# Patient Record
Sex: MALE | Race: WHITE | NOT HISPANIC OR LATINO | Employment: OTHER | URBAN - METROPOLITAN AREA
[De-identification: names, ages, dates, MRNs, and addresses within clinical notes are randomized per-mention and may not be internally consistent; named-entity substitution may affect disease eponyms.]

---

## 2017-01-09 ENCOUNTER — HISTORICAL (OUTPATIENT)
Dept: ADMINISTRATIVE | Facility: HOSPITAL | Age: 67
End: 2017-01-09

## 2017-01-13 ENCOUNTER — HISTORICAL (OUTPATIENT)
Dept: ADMINISTRATIVE | Facility: HOSPITAL | Age: 67
End: 2017-01-13

## 2017-01-15 ENCOUNTER — HISTORICAL (OUTPATIENT)
Dept: LAB | Facility: HOSPITAL | Age: 67
End: 2017-01-15

## 2017-01-18 ENCOUNTER — HISTORICAL (OUTPATIENT)
Dept: LAB | Facility: HOSPITAL | Age: 67
End: 2017-01-18

## 2017-01-20 ENCOUNTER — HISTORICAL (OUTPATIENT)
Dept: ADMINISTRATIVE | Facility: HOSPITAL | Age: 67
End: 2017-01-20

## 2017-01-26 ENCOUNTER — HISTORICAL (OUTPATIENT)
Dept: LAB | Facility: HOSPITAL | Age: 67
End: 2017-01-26

## 2017-05-25 ENCOUNTER — HISTORICAL (OUTPATIENT)
Dept: LAB | Facility: HOSPITAL | Age: 67
End: 2017-05-25

## 2017-05-25 LAB
ABS NEUT (OLG): 4.5 X10(3)/MCL
BUN SERPL-MCNC: 29 MG/DL (ref 7–18)
CALCIUM SERPL-MCNC: 9.4 MG/DL (ref 8.5–10.1)
CHLORIDE SERPL-SCNC: 95 MMOL/L (ref 98–107)
CO2 SERPL-SCNC: 35 MMOL/L (ref 21–32)
CREAT SERPL-MCNC: 1.57 MG/DL (ref 0.7–1.3)
ERYTHROCYTE [DISTWIDTH] IN BLOOD BY AUTOMATED COUNT: 14.6 % (ref 11.5–14.8)
GLUCOSE SERPL-MCNC: 152 MG/DL (ref 74–106)
HCT VFR BLD AUTO: 34 % (ref 36.2–49.3)
HGB BLD-MCNC: 11.5 GM/DL (ref 12.6–17.3)
INR PPP: 1
MCH RBC QN AUTO: 28.8 PG (ref 28.5–33.8)
MCHC RBC AUTO-ENTMCNC: 33.8 % (ref 32.6–37)
MCV RBC AUTO: 85.2 FL (ref 80–99)
PLATELET # BLD AUTO: 213 X10(3)/MCL (ref 136–369)
PMV BLD AUTO: 9 FL (ref 7.4–10.4)
POTASSIUM SERPL-SCNC: 3.3 MMOL/L (ref 3.5–5.1)
PROTHROMBIN TIME: 10.7 SECOND(S) (ref 9.8–12)
RBC # BLD AUTO: 3.99 X10(6)/MCL (ref 4.19–5.75)
SODIUM SERPL-SCNC: 137 MMOL/L (ref 136–145)
WBC # SPEC AUTO: 9.6 X10(3)/MCL (ref 4–10.5)

## 2017-05-30 ENCOUNTER — HISTORICAL (OUTPATIENT)
Dept: ADMINISTRATIVE | Facility: HOSPITAL | Age: 67
End: 2017-05-30

## 2017-08-31 ENCOUNTER — HISTORICAL (OUTPATIENT)
Dept: ADMINISTRATIVE | Facility: HOSPITAL | Age: 67
End: 2017-08-31

## 2017-09-03 LAB — FINAL CULTURE: NORMAL

## 2017-11-21 ENCOUNTER — HISTORICAL (OUTPATIENT)
Dept: SURGERY | Facility: HOSPITAL | Age: 67
End: 2017-11-21

## 2018-01-29 ENCOUNTER — HISTORICAL (OUTPATIENT)
Dept: LAB | Facility: HOSPITAL | Age: 68
End: 2018-01-29

## 2018-01-29 LAB
ABS NEUT (OLG): 6.41 X10(3)/MCL (ref 2.1–9.2)
BUN SERPL-MCNC: 31 MG/DL (ref 7–18)
CALCIUM SERPL-MCNC: 9.3 MG/DL (ref 8.5–10.1)
CHLORIDE SERPL-SCNC: 96 MMOL/L (ref 98–107)
CO2 SERPL-SCNC: 30 MMOL/L (ref 21–32)
CREAT SERPL-MCNC: 1.54 MG/DL (ref 0.7–1.3)
ERYTHROCYTE [DISTWIDTH] IN BLOOD BY AUTOMATED COUNT: 13.4 % (ref 11.5–17)
GLUCOSE SERPL-MCNC: 212 MG/DL (ref 74–106)
HCT VFR BLD AUTO: 31.2 % (ref 42–52)
HGB BLD-MCNC: 10.9 GM/DL (ref 14–18)
INR PPP: 1 (ref 2–3)
MCH RBC QN AUTO: 30.4 PG (ref 27–31)
MCHC RBC AUTO-ENTMCNC: 34.9 GM/DL (ref 33–36)
MCV RBC AUTO: 87.2 FL (ref 80–94)
PLATELET # BLD AUTO: 200 X10(3)/MCL (ref 130–400)
PMV BLD AUTO: 9.6 FL (ref 7.4–10.4)
POTASSIUM SERPL-SCNC: 3.5 MMOL/L (ref 3.5–5.1)
PROTHROMBIN TIME: 10.9 SECOND(S) (ref 9.3–11.4)
RBC # BLD AUTO: 3.58 X10(6)/MCL (ref 4.7–6.1)
SODIUM SERPL-SCNC: 137 MMOL/L (ref 136–145)
WBC # SPEC AUTO: 10.8 X10(3)/MCL (ref 4.5–11.5)

## 2018-01-30 ENCOUNTER — HISTORICAL (OUTPATIENT)
Dept: SURGERY | Facility: HOSPITAL | Age: 68
End: 2018-01-30

## 2018-07-11 ENCOUNTER — HISTORICAL (OUTPATIENT)
Dept: ADMINISTRATIVE | Facility: HOSPITAL | Age: 68
End: 2018-07-11

## 2018-07-16 LAB
FINAL CULTURE: NORMAL
FINAL CULTURE: NORMAL

## 2018-07-30 ENCOUNTER — HISTORICAL (OUTPATIENT)
Dept: ADMINISTRATIVE | Facility: HOSPITAL | Age: 68
End: 2018-07-30

## 2018-08-13 ENCOUNTER — HISTORICAL (OUTPATIENT)
Dept: ADMINISTRATIVE | Facility: HOSPITAL | Age: 68
End: 2018-08-13

## 2018-08-13 LAB
ALBUMIN SERPL-MCNC: 3.7 GM/DL (ref 3.4–5)
ALBUMIN/GLOB SERPL: 1 RATIO (ref 1–2)
ALP SERPL-CCNC: 99 UNIT/L (ref 45–117)
ALT SERPL-CCNC: 40 UNIT/L (ref 12–78)
AST SERPL-CCNC: 25 UNIT/L (ref 15–37)
BILIRUB SERPL-MCNC: 0.3 MG/DL (ref 0.2–1)
BILIRUBIN DIRECT+TOT PNL SERPL-MCNC: 0.1 MG/DL
BILIRUBIN DIRECT+TOT PNL SERPL-MCNC: 0.2 MG/DL
BUN SERPL-MCNC: 25 MG/DL (ref 7–18)
CALCIUM SERPL-MCNC: 9.9 MG/DL (ref 8.5–10.1)
CHLORIDE SERPL-SCNC: 97 MMOL/L (ref 98–107)
CO2 SERPL-SCNC: 34 MMOL/L (ref 21–32)
CREAT SERPL-MCNC: 1.5 MG/DL (ref 0.6–1.3)
ERYTHROCYTE [DISTWIDTH] IN BLOOD BY AUTOMATED COUNT: 15.7 % (ref 11.5–14.5)
GLOBULIN SER-MCNC: 4.7 GM/ML (ref 2.3–3.5)
GLUCOSE SERPL-MCNC: 246 MG/DL (ref 74–106)
HCT VFR BLD AUTO: 33.8 % (ref 40–51)
HGB BLD-MCNC: 11.1 GM/DL (ref 13.5–17.5)
MCH RBC QN AUTO: 30.4 PG (ref 26–34)
MCHC RBC AUTO-ENTMCNC: 32.8 GM/DL (ref 31–37)
MCV RBC AUTO: 92.6 FL (ref 80–100)
PLATELET # BLD AUTO: 224 X10(3)/MCL (ref 130–400)
PMV BLD AUTO: 9.9 FL (ref 7.4–10.4)
POTASSIUM SERPL-SCNC: 4.1 MMOL/L (ref 3.5–5.1)
PROT SERPL-MCNC: 8.4 GM/DL (ref 6.4–8.2)
RBC # BLD AUTO: 3.65 X10(6)/MCL (ref 4.5–5.9)
SODIUM SERPL-SCNC: 138 MMOL/L (ref 136–145)
WBC # SPEC AUTO: 8.5 X10(3)/MCL (ref 4.5–11)

## 2018-08-14 ENCOUNTER — HISTORICAL (OUTPATIENT)
Dept: SURGERY | Facility: HOSPITAL | Age: 68
End: 2018-08-14

## 2018-08-14 LAB — POTASSIUM SERPL-SCNC: 4 MMOL/L (ref 3.5–5.1)

## 2018-08-21 ENCOUNTER — HISTORICAL (OUTPATIENT)
Dept: SURGERY | Facility: HOSPITAL | Age: 68
End: 2018-08-21

## 2018-08-21 LAB
ABS NEUT (OLG): 3.93 X10(3)/MCL (ref 2.1–9.2)
BASOPHILS # BLD AUTO: 0.06 X10(3)/MCL
BASOPHILS NFR BLD AUTO: 1 %
EOSINOPHIL # BLD AUTO: 0.43 X10(3)/MCL
EOSINOPHIL NFR BLD AUTO: 5 %
ERYTHROCYTE [DISTWIDTH] IN BLOOD BY AUTOMATED COUNT: 14.6 % (ref 11.5–14.5)
HCT VFR BLD AUTO: 33.6 % (ref 40–51)
HGB BLD-MCNC: 11.1 GM/DL (ref 13.5–17.5)
IMM GRANULOCYTES # BLD AUTO: 0.03 10*3/UL
IMM GRANULOCYTES NFR BLD AUTO: 0 %
LYMPHOCYTES # BLD AUTO: 3.41 X10(3)/MCL
LYMPHOCYTES NFR BLD AUTO: 39 % (ref 13–40)
MCH RBC QN AUTO: 30.4 PG (ref 26–34)
MCHC RBC AUTO-ENTMCNC: 33 GM/DL (ref 31–37)
MCV RBC AUTO: 92.1 FL (ref 80–100)
MONOCYTES # BLD AUTO: 0.84 X10(3)/MCL
MONOCYTES NFR BLD AUTO: 10 % (ref 4–12)
NEUTROPHILS # BLD AUTO: 3.93 X10(3)/MCL
NEUTROPHILS NFR BLD AUTO: 45 X10(3)/MCL
PLATELET # BLD AUTO: 232 X10(3)/MCL (ref 130–400)
PMV BLD AUTO: 9.9 FL (ref 7.4–10.4)
POTASSIUM SERPL-SCNC: 3.7 MMOL/L (ref 3.5–5.1)
RBC # BLD AUTO: 3.65 X10(6)/MCL (ref 4.5–5.9)
WBC # SPEC AUTO: 8.7 X10(3)/MCL (ref 4.5–11)

## 2018-10-31 ENCOUNTER — HISTORICAL (OUTPATIENT)
Dept: ADMINISTRATIVE | Facility: HOSPITAL | Age: 68
End: 2018-10-31

## 2018-11-02 LAB — FINAL CULTURE: NORMAL

## 2019-02-07 ENCOUNTER — HISTORICAL (OUTPATIENT)
Dept: ADMINISTRATIVE | Facility: HOSPITAL | Age: 69
End: 2019-02-07

## 2019-02-09 LAB — FINAL CULTURE: NORMAL

## 2019-08-12 ENCOUNTER — HISTORICAL (OUTPATIENT)
Dept: LAB | Facility: HOSPITAL | Age: 69
End: 2019-08-12

## 2019-08-12 LAB
ABS NEUT (OLG): 14.78 X10(3)/MCL (ref 2.1–9.2)
ACANTHOCYTES (OLG): 0
ALBUMIN SERPL-MCNC: 3.3 GM/DL (ref 3.4–5)
ALBUMIN/GLOB SERPL: 0.8 {RATIO}
ALP SERPL-CCNC: 92 UNIT/L (ref 50–136)
ALT SERPL-CCNC: 22 UNIT/L (ref 12–78)
AST SERPL-CCNC: 16 UNIT/L (ref 15–37)
BASOPHILS # BLD AUTO: 0 X10(3)/MCL (ref 0–0.2)
BASOPHILS NFR BLD AUTO: 0 %
BILIRUB SERPL-MCNC: 0.4 MG/DL (ref 0.2–1)
BILIRUBIN DIRECT+TOT PNL SERPL-MCNC: 0.1 MG/DL (ref 0–0.2)
BILIRUBIN DIRECT+TOT PNL SERPL-MCNC: 0.3 MG/DL (ref 0–0.8)
BUN SERPL-MCNC: 36 MG/DL (ref 7–18)
BURR CELLS BLD QL SMEAR: 0
CALCIUM SERPL-MCNC: 9.5 MG/DL (ref 8.5–10.1)
CHLORIDE SERPL-SCNC: 100 MMOL/L (ref 98–107)
CO2 SERPL-SCNC: 27 MMOL/L (ref 21–32)
CREAT SERPL-MCNC: 2.23 MG/DL (ref 0.7–1.3)
DACRYOCYTES BLD QL SMEAR: 0
DEPRECATED CALCIDIOL+CALCIFEROL SERPL-MC: 15.94 NG/ML (ref 30–80)
EOSINOPHIL # BLD AUTO: 0 X10(3)/MCL (ref 0–0.9)
EOSINOPHIL NFR BLD AUTO: 0 %
ERYTHROCYTE [DISTWIDTH] IN BLOOD BY AUTOMATED COUNT: 17.8 % (ref 11.5–17)
EST. AVERAGE GLUCOSE BLD GHB EST-MCNC: 154 MG/DL
GLOBULIN SER-MCNC: 4.4 GM/DL (ref 2.4–3.5)
GLUCOSE SERPL-MCNC: 186 MG/DL (ref 74–106)
HBA1C MFR BLD: 7 % (ref 4.2–6.3)
HCT VFR BLD AUTO: 32.3 % (ref 42–52)
HGB BLD-MCNC: 9.6 GM/DL (ref 14–18)
LYMPHOCYTES # BLD AUTO: 1.9 X10(3)/MCL (ref 0.6–4.6)
LYMPHOCYTES NFR BLD AUTO: 10 %
MAGNESIUM SERPL-MCNC: 1.8 MG/DL (ref 1.8–2.4)
MCH RBC QN AUTO: 27.7 PG (ref 27–31)
MCHC RBC AUTO-ENTMCNC: 29.7 GM/DL (ref 33–36)
MCV RBC AUTO: 93.1 FL (ref 80–94)
MONOCYTES # BLD AUTO: 1.3 X10(3)/MCL (ref 0.1–1.3)
MONOCYTES NFR BLD AUTO: 7 %
NEUTROPHILS # BLD AUTO: 14.78 X10(3)/MCL (ref 2.1–9.2)
NEUTROPHILS NFR BLD AUTO: 81 %
OVALOCYTES BLD QL SMEAR: 0
PLATELET # BLD AUTO: 238 X10(3)/MCL (ref 130–400)
PLATELET # BLD EST: NORMAL 10*3/UL
PMV BLD AUTO: 11.1 FL (ref 7.4–10.4)
POTASSIUM SERPL-SCNC: 4.1 MMOL/L (ref 3.5–5.1)
PROT SERPL-MCNC: 7.7 GM/DL (ref 6.4–8.2)
PSA SERPL-MCNC: 0.3 NG/ML (ref 0–4)
PTH-INTACT SERPL-MCNC: 6.8 PG/ML (ref 18.4–80.1)
RBC # BLD AUTO: 3.47 X10(6)/MCL (ref 4.7–6.1)
SODIUM SERPL-SCNC: 138 MMOL/L (ref 136–145)
SPHEROCYTES BLD QL SMEAR: 0
WBC # SPEC AUTO: 18.2 X10(3)/MCL (ref 4.5–11.5)

## 2020-09-08 ENCOUNTER — HISTORICAL (OUTPATIENT)
Dept: ADMINISTRATIVE | Facility: HOSPITAL | Age: 70
End: 2020-09-08

## 2020-09-08 LAB
ABS NEUT (OLG): 5.13 X10(3)/MCL (ref 2.1–9.2)
ALBUMIN SERPL-MCNC: 3.5 GM/DL (ref 3.4–5)
ALBUMIN/GLOB SERPL: 1 RATIO (ref 1.1–2)
ALP SERPL-CCNC: 90 UNIT/L (ref 40–150)
ALT SERPL-CCNC: 24 UNIT/L (ref 0–55)
AST SERPL-CCNC: 24 UNIT/L (ref 5–34)
BASOPHILS # BLD AUTO: 0 X10(3)/MCL (ref 0–0.2)
BASOPHILS NFR BLD AUTO: 1 %
BILIRUB SERPL-MCNC: 0.6 MG/DL
BILIRUBIN DIRECT+TOT PNL SERPL-MCNC: 0.2 MG/DL (ref 0–0.5)
BILIRUBIN DIRECT+TOT PNL SERPL-MCNC: 0.4 MG/DL (ref 0–0.8)
BNP BLD-MCNC: 118.4 PG/ML
BUN SERPL-MCNC: 34.1 MG/DL (ref 8.4–25.7)
CALCIUM SERPL-MCNC: 8.7 MG/DL (ref 8.8–10)
CHLORIDE SERPL-SCNC: 100 MMOL/L (ref 98–107)
CO2 SERPL-SCNC: 28 MMOL/L (ref 23–31)
CREAT SERPL-MCNC: 1.72 MG/DL (ref 0.73–1.18)
EOSINOPHIL # BLD AUTO: 0.2 X10(3)/MCL (ref 0–0.9)
EOSINOPHIL NFR BLD AUTO: 3 %
ERYTHROCYTE [DISTWIDTH] IN BLOOD BY AUTOMATED COUNT: 15.9 % (ref 11.5–17)
GLOBULIN SER-MCNC: 3.5 GM/DL (ref 2.4–3.5)
GLUCOSE SERPL-MCNC: 123 MG/DL (ref 82–115)
HCT VFR BLD AUTO: 34.3 % (ref 42–52)
HGB BLD-MCNC: 10.7 GM/DL (ref 14–18)
LYMPHOCYTES # BLD AUTO: 2.2 X10(3)/MCL (ref 0.6–4.6)
LYMPHOCYTES NFR BLD AUTO: 26 %
MCH RBC QN AUTO: 28.9 PG (ref 27–31)
MCHC RBC AUTO-ENTMCNC: 31.2 GM/DL (ref 33–36)
MCV RBC AUTO: 92.7 FL (ref 80–94)
MONOCYTES # BLD AUTO: 0.8 X10(3)/MCL (ref 0.1–1.3)
MONOCYTES NFR BLD AUTO: 10 %
NEUTROPHILS # BLD AUTO: 5.13 X10(3)/MCL (ref 2.1–9.2)
NEUTROPHILS NFR BLD AUTO: 60 %
PLATELET # BLD AUTO: 207 X10(3)/MCL (ref 130–400)
PMV BLD AUTO: 11.3 FL (ref 9.4–12.4)
POTASSIUM SERPL-SCNC: 4.5 MMOL/L (ref 3.5–5.1)
PROT SERPL-MCNC: 7 GM/DL (ref 5.8–7.6)
RBC # BLD AUTO: 3.7 X10(6)/MCL (ref 4.7–6.1)
SODIUM SERPL-SCNC: 139 MMOL/L (ref 136–145)
WBC # SPEC AUTO: 8.5 X10(3)/MCL (ref 4.5–11.5)

## 2022-04-10 ENCOUNTER — HISTORICAL (OUTPATIENT)
Dept: ADMINISTRATIVE | Facility: HOSPITAL | Age: 72
End: 2022-04-10

## 2022-04-30 VITALS
BODY MASS INDEX: 40.09 KG/M2 | OXYGEN SATURATION: 98 % | SYSTOLIC BLOOD PRESSURE: 112 MMHG | HEIGHT: 70 IN | WEIGHT: 280 LBS | DIASTOLIC BLOOD PRESSURE: 69 MMHG

## 2022-04-30 NOTE — OP NOTE
Patient:   Landon Aragon            MRN: [id number]            FIN: [id number]               Age:   67 years     Sex:  Male     :  1950   Associated Diagnoses:   None   Author:   Landon Armas DPM      Operative Note   Operative Information   Date/ Time:  2017 13:10:00.     Procedures Performed: Excisional full-thickness debridement including subcutaneous tissue with application of theraskin graft.     Indications: 8 x 10 cm wound to left heel pre-and post-debridement measurements are the same with no increase in depth.     Preoperative Diagnosis: Nonhealing ulceration left heel.     Postoperative Diagnosis: Same.     Surgeon: Landon Armas DPM.     Anesthesia: Local with sedation.     Description of Procedure/Findings/    Complications: Seizure #1: Excisional full-thickness debridement including subcutaneous tissue with application of theraskin graft.    Tension was directed to the posterior aspect of the left heel where using a 15 blade scalpel as well as in an millimeters dermal curet all necrotic tissue was removed from the wound.  The wound was scored with 15 blade scalpel as well a extra large theraskin graft was then applied to the wound with 100% usage and 100% coverage of the wound.    The area was then sterilely bandaged.  .     Esimated blood loss: No blood loss.

## 2022-04-30 NOTE — OP NOTE
Patient:   Landon Aragon            MRN: [id number]            FIN: [id number]               Age:   67 years     Sex:  Male     :  1950   Associated Diagnoses:   None   Author:   Landon Armas DPM      Operative Note   Operative Information   Date/ Time:  2018 13:12:00.     Procedures Performed: Excisional full-thickness debridement including subcutaneous tissue multiple ulcerations bilateral lower extremities application of epi fix graft the right heel and theraskin graft to the left heel.     Indications: 4 x 4 cm pre-and post debridement with 0.1-0.2 cm of depth change on the right heel    9 x 9 cm pre-and post debridement with an increase in depth from 0.1-0.3 cm on the left heel.     Preoperative Diagnosis: Nonhealing ulcerations as above.     Postoperative Diagnosis: Same.     Surgeon: Landon Armas DPM.     Anesthesia: Local with sedation.     Description of Procedure/Findings/    Complications: Procedure #1: Excisional full-thickness debridement including subcutaneous tissue with application of epi fix graft right heel.    Attention was directed to the right heel where using accommodation of 15 blade scalpel and 7 mm dermal curette all necrotic tissue was removed from the area.  At the level of bleeding tissue a 4 x 4 epi fix graft was placed and the area was bandaged.    Procedure #2: Excisional full-thickness debridement including subcutaneous tissue with application of theraskin graft left heel.    Attention was directed to the left heel where using a combination of 15 blade scalpel and Paul millimeters dermal curette all necrotic tissue was removed down to good bleeding tissue.  The wound bed was scored and a extra large theraskin graft was placed over the wound and stapled into place.  Both were then sterilely bandaged..     Esimated blood loss: No blood loss.

## 2022-04-30 NOTE — OP NOTE
Patient:   Landon Aragon            MRN: [id number]            FIN: [id number]               Age:   66 years     Sex:  Male     :  1950   Associated Diagnoses:   None   Author:   Landon Armas DPM      Operative Note   Operative Information   Date/ Time:  2017 12:59:00.     Procedures Performed: Excisional full-thickness debridement including subcutaneous tissue muscle and tendon of the left heel with application of theraskin graft.     Indications: Pre-debridement measurements 10 x 10 cm pre-and postoperatively with 0.1 cm of depth pre-debridement and 1.5 cm of depth post debridement.     Preoperative Diagnosis: Nonhealing wound left heel.     Postoperative Diagnosis: Same.     Surgeon: Landon Armas DPM.     Anesthesia: Local with sedation.     Description of Procedure/Findings/    Complications: Procedure 1: Excisional full-thickness debridement includes subcutaneous tissue muscle-tendon of the left heel with application of theraskin graft.    Attention was directed to the left heel where all necrotic tissue was removed utilizing a combination of 7 mm dermal curette as well as a 15 blade scalpel.  The residual Achilles tendon was removed down to periosteum however no osseous structures were identified at this point.  One extra large theraskin graft was utilized to cover the area 100%.  100% usage is identified.  The graft was stapled in the place.  The area was then sterilely bandaged..     Esimated blood loss: loss  20  cc.

## 2022-04-30 NOTE — OP NOTE
Patient:   Landon Aragon            MRN: [id number]            FIN: [id number]               Age:   67 years     Sex:  Male     :  1950   Associated Diagnoses:   None   Author:   Linda Falk MD      DATE OF SURGERY: 18    ATTENDING PHYSICIAN: Erin Rosenthal MD    RESIDENT SURGEON: Linda Falk MD    PREOPERATIVE DIAGNOSIS: Bilateral Mixed Hearing Loss, Eustachian tube dysfunction    POSTOPERATIVE DIAGNOSIS: Bilateral Mixed Hearing Loss, Eustachian tube dysfunction    PROCEDURE:   1. Right PET placement  2. Bilateral exam under anesthesia  3. Wick placement in Left ear      PROCEDURE IN DETAIL: The patient was brought to the operating room and placed in supine position. Anesthesia was induced via endo tracheal intubation. Attention was first turned to the left ear. The operating microscope was used to visualize the ear and cerumen was removed using a curette. The lobule had ezcematous changes that spread to the EAC. The EAC was symmetrically edematous and very difficult to visualize the TM. A small speculum was fit into the ear with some difficulty but I was unable to fit a larger size for better visibility. A small amount of wax and debris was suctioned from the deep EAC but the TM was still not visualized appreciably. At this time a wick was placed in the left ear and drops were instilled. Next, attention was turned to the right ear where again the microscope was used to clean cerumen from the EAC. The TM was visualized and a radial myringotomy incision was made in the anterior inferior quadrant. No fluid was suctioned from the middle ear and an Agustin PE tube was placed without difficulty. Drops were placed in the ear.  Patient was handed back to Anesthesia to be awakened and transferred to the postanesthesia care unit in stable condition.  Patient was given a Rx for Ciprodex drops and instructed to return in 1 week to re-attempt PET placement on the Left side. He can attempt  hyperbarics treatment in the meantime if otalgia resolves.     COMPLICATIONS: None.    ESTIMATED BLOOD LOSS: 0 cc.    SPECIMENS: none      Linda Falk MD  HO-II

## 2022-04-30 NOTE — OP NOTE
Patient:   Landon Aragon            MRN: [id number]            FIN: [id number]               Age:   67 years     Sex:  Male     :  1950   Associated Diagnoses:   None   Author:   Linda Falk MD      DATE OF SURGERY: 18    ATTENDING PHYSICIAN: Erin Rosenthal MD    RESIDENT SURGEON: Linda Falk MD    PREOPERATIVE DIAGNOSIS: Bilateral Mixed Hearing Loss    POSTOPERATIVE DIAGNOSIS: Left Serous Effusion, Otomycosis of Right Ear    PROCEDURE:   1. Left PET placement  2. Right EUA    PROCEDURE IN DETAIL: The patient was brought to the operating room and placed in supine position. Anesthesia was induced via endo tracheal intubation. Attention was first turned to the right ear, the ear microscope was brought into the surgical field. A small piece of a cotton ball was removed from the auditory canal and small white hyphae were visualized beyond. A large amount of cerumen and soft squamous debris was removed easily with suction. A PET was visualized in the anterior inferior quadrant with surrounding black and white appearing fungal debris; a piece was sent for tissue culture. Next, attention was turned to the left ear. The smallest speculum was inserted into the canal with some difficulty. The canal was gradually dilated until the deeper portions of the canal were visible. Sopious amount of soft squamous debris was removed with suction. The tympanic membrane was finally visualized and appeared intact. A myringotomy was made in the anterior inferior quadrant and a small amount of thick fluid was suctioned free. Next an lopez PET was placed in the tympanic membrane easily. Floxin drops were instilled into the ear and a cotton ball was applied.    Patient was handed back to Anesthesia to be awakened and transferred to the postanesthesia care unit in stable condition.    COMPLICATIONS: None.    ESTIMATED BLOOD LOSS: 0 cc.    SPECIMENS:   1. Right ear culture    Linda Falk MD  -II

## 2022-05-02 NOTE — HISTORICAL [facility] CERNER
This is a historical note converted from [employer]. Formatting and pictures may have been removed.  Please reference [employer] for original formatting and attached multimedia. History of Present Illness  67 year old male undergoing hyperbarics for foot. Had one treatment with severe left otalgia and hearing loss. Already had hearing loss on the right. No otorrhea. No vertigo. Occasional nonpulsatile tinnitus. History of loud noise exposure from work. No ear surgeries. No recent audiogram.  ?   8/14/18: Here today for b/l PETs  Review of Systems  General: denies fatigue, unintentional weight loss, fever or chills  Eyes: denies vision change  Ears: denies change in hearing, otorrhea, otalgia, tinnitus, or vertigo  Nose: denies rhinorrhea, nasal congestion, sneezing  Mouth: denies sore throat  Neck: denies new neck mass or lymphadenopathy  Respiratory: denies cough, dyspnea  Cardiovascular: denies chest pain  GI: denies dysphagia, or nausea  Skin: denies rash or changing skin lesion  Endocrine: denies heat or cold intolerance  Neurologic: denies headache, syncope, motor or sensory deficit  Psych: denies anxiety or depression  Physical Exam  General - no?acute distress, alert/oriented  Voice - no dysphonia  Eye - extraocular movements intact bilaterally, pupils equal round and reactive to light and accommodation  Head - normocephalic, atraumatic  Ears - AD: normal external ear, EAC normal, TM appears opacified, difficult?to see anterior most aspect  ?????????AS: normal external ear, EAC normal, TM? with wax and cotton overlying posterior/inferior aspect and unable to see remainder of TM, no middle ear effusion, no retraction  ?????????Grossly?decreased hearing, ariza lateralizes right, BC>AC?bilaterally  Nose - bilateral nares patent, no nasal septal deviation, no inferior turbinate hypertrophy, no masses/lesions apparent  Oral cavity/oropharynx - mucosal membranes moist, tonsils within normal limits, floor of mouth soft and  nontender, tongue within normal limits, no masses or lesions noted  Neck - supple, nontender, no cervical?lymphadenopathy, normal range of motion?  Cardiovascular - regular rate, normal rhythm;?radial pulses palpable bilaterally  Respiratory - nonlabored respirations  Neurologic - cranial nerves II to XII grossly intact bilaterally?  ?  ?   Audio:  Right ear: ??Moderate to severe mixed hearing loss from 250-8kHz. Speech and discrimination scores are in agreement with puretone findings.  ?  Left ear:??Mild to severe (possibly) mixed hearing loss from 250-8kHz. ?Note, true bone conduction scores could not be obtained due to the masking dilemma. ?Speech and discrimination scores are in agreement with puretone findings.  Right Type A; Left Type B  Assessment/Plan  Ordered:  ofloxacin otic, 1 drop(s), form: Soln-Otic, Otic, Once-Unscheduled, Order duration: 1 dose(s), first dose 08/14/18 5:09:00 CDT, have available in OR  Place in Outpatient Observation, 08/14/18 5:09:00 CDT, Deya RAYA, [facility] [place] [facility], No  To OR today for b/l PETs  RTC in 3 weeks for tube check  OK to resume hyperbarics ASAP   Problem List/Past Medical History  Ongoing  Arthritis  Barotrauma  CHF, acute  CKD - chronic kidney disease  Constipation  Diabetes  Dyslipidemia  Elevated serum cholesterol  Foot infection  Hearing loss  Hypertension  Impaired mobility  Kidney failure, acute  Neuropathy  Osteomyelitis due to type 2 diabetes mellitus  PAD (peripheral artery disease)  Pneumonia  PVD (peripheral vascular disease)  Shortness of breath  Ulcer of foot, chronic  Ulcer of heel  VH - Vitreous hemorrhage  Historical  Cataract  Procedure/Surgical History  Application of skin substitute graft to face, scalp, eyelids, mouth, neck, ears, orbits, genitalia, hands, feet, and/or multiple digits, total wound surface area up to 100 sq cm; each additional 25 sq cm wound surface area, or part thereof (List separatel (01/30/2018)  Application of skin substitute  graft to face, scalp, eyelids, mouth, neck, ears, orbits, genitalia, hands, feet, and/or multiple digits, total wound surface area up to 100 sq cm; each additional 25 sq cm wound surface area, or part thereof (List separatel (01/30/2018)  Application of skin substitute graft to face, scalp, eyelids, mouth, neck, ears, orbits, genitalia, hands, feet, and/or multiple digits, total wound surface area up to 100 sq cm; each additional 25 sq cm wound surface area, or part thereof (List separatel (01/30/2018)  Application of skin substitute graft to face, scalp, eyelids, mouth, neck, ears, orbits, genitalia, hands, feet, and/or multiple digits, total wound surface area up to 100 sq cm; first 25 sq cm or less wound surface area (01/30/2018)  Excision of Left Foot Subcutaneous Tissue and Fascia, Open Approach (01/30/2018)  Excision of Right Foot Subcutaneous Tissue and Fascia, Open Approach (01/30/2018)  Replacement of Left Foot Skin with Nonautologous Tissue Substitute, Full Thickness, External Approach (01/30/2018)  Replacement of Right Foot Skin with Nonautologous Tissue Substitute, Full Thickness, External Approach (01/30/2018)  Surgical preparation or creation of recipient site by excision of open wounds, burn eschar, or scar (including subcutaneous tissues), or incisional release of scar contracture, face, scalp, eyelids, mouth, neck, ears, orbits, genitalia, hands, feet and/or (01/30/2018)  Wound Debridement (Bilateral) (01/30/2018)  Application of skin substitute graft to face, scalp, eyelids, mouth, neck, ears, orbits, genitalia, hands, feet, and/or multiple digits, total wound surface area up to 100 sq cm; first 25 sq cm or less wound surface area (11/21/2017)  Replacement of Left Foot Subcutaneous Tissue and Fascia with Nonautologous Tissue Substitute, Percutaneous Approach (11/21/2017)  Wound Debridement (Left) (11/21/2017)  Application of skin substitute graft to face, scalp, eyelids, mouth, neck, ears, orbits,  genitalia, hands, feet, and/or multiple digits, total wound surface area up to 100 sq cm; first 25 sq cm or less wound surface area (05/30/2017)  Excision of Left Foot Muscle, Open Approach (05/30/2017)  Replacement of Left Foot Skin with Synthetic Substitute, Full Thickness, External Approach (05/30/2017)  Wound Debridement (Left) (05/30/2017)  Fluoroscopy of Multiple Coronary Arteries using Low Osmolar Contrast (02/09/2017)  Measurement of Cardiac Sampling and Pressure, Left Heart, Percutaneous Approach (02/09/2017)  Transfusion of Nonautologous Red Blood Cells into Peripheral Vein, Percutaneous Approach (02/06/2017)  Application of skin substitute graft to face, scalp, eyelids, mouth, neck, ears, orbits, genitalia, hands, feet, and/or multiple digits, total wound surface area greater than or equal to 100 sq cm; each additional 100 sq cm wound surface area, or part the (01/20/2017)  Application of skin substitute graft to face, scalp, eyelids, mouth, neck, ears, orbits, genitalia, hands, feet, and/or multiple digits, total wound surface area greater than or equal to 100 sq cm; first 100 sq cm wound surface area, or 1% of body area of (01/20/2017)  Excision of Left Foot Bursa and Ligament, Open Approach (01/20/2017)  Replacement of Left Foot Skin with Nonautologous Tissue Substitute, Full Thickness, External Approach (01/20/2017)  Surgical preparation or creation of recipient site by excision of open wounds, burn eschar, or scar (including subcutaneous tissues), or incisional release of scar contracture, face, scalp, eyelids, mouth, neck, ears, orbits, genitalia, hands, feet and/or (01/20/2017)  Surgical preparation or creation of recipient site by excision of open wounds, burn eschar, or scar (including subcutaneous tissues), or incisional release of scar contracture, face, scalp, eyelids, mouth, neck, ears, orbits, genitalia, hands, feet and/or (01/20/2017)  Wound Debridement (Left, Foot) (01/20/2017)  Insertion  of Infusion Device into Superior Vena Cava, Percutaneous Approach (01/13/2017)  Insertion of peripherally inserted central venous catheter (PICC), without subcutaneous port or pump; age 5 years or older (01/13/2017)  Insertion of Infusion Device into Right Internal Jugular Vein, Percutaneous Approach (01/09/2017)  Insertion of peripherally inserted central venous catheter (PICC), without subcutaneous port or pump; age 5 years or older (01/09/2017)  Debridement (eg, high pressure waterjet with/without suction, sharp selective debridement with scissors, scalpel and forceps), open wound, (eg, fibrin, devitalized epidermis and/or dermis, exudate, debris, biofilm), including topical application(s), wound (12/22/2016)  Debridement (eg, high pressure waterjet with/without suction, sharp selective debridement with scissors, scalpel and forceps), open wound, (eg, fibrin, devitalized epidermis and/or dermis, exudate, debris, biofilm), including topical application(s), wound (12/22/2016)  Debridement (eg, high pressure waterjet with/without suction, sharp selective debridement with scissors, scalpel and forceps), open wound, (eg, fibrin, devitalized epidermis and/or dermis, exudate, debris, biofilm), including topical application(s), wound (12/22/2016)  Debridement (eg, high pressure waterjet with/without suction, sharp selective debridement with scissors, scalpel and forceps), open wound, (eg, fibrin, devitalized epidermis and/or dermis, exudate, debris, biofilm), including topical application(s), wound (12/22/2016)  Debridement (eg, high pressure waterjet with/without suction, sharp selective debridement with scissors, scalpel and forceps), open wound, (eg, fibrin, devitalized epidermis and/or dermis, exudate, debris, biofilm), including topical application(s), wound (12/22/2016)  Extraction of Left Foot Skin, External Approach (12/22/2016)  Excision of Left Foot Subcutaneous Tissue and Fascia, Open Approach  (12/09/2016)  Full thickness graft, free, including direct closure of donor site, forehead, cheeks, chin, mouth, neck, axillae, genitalia, hands, and/or feet; 20 sq cm or less (12/09/2016)  Replacement of Left Foot Skin with Nonautologous Tissue Substitute, Full Thickness, External Approach (12/09/2016)  Wound Debridement (Left) (12/09/2016)  Debridement, muscle and/or fascia (includes epidermis, dermis, and subcutaneous tissue, if performed); first 20 sq cm or less (11/01/2016)  Excision of Left Foot Subcutaneous Tissue and Fascia, Open Approach (11/01/2016)  Full thickness graft, free, including direct closure of donor site, forehead, cheeks, chin, mouth, neck, axillae, genitalia, hands, and/or feet; 20 sq cm or less (11/01/2016)  Replacement of Left Foot Skin with Nonautologous Tissue Substitute, Full Thickness, External Approach (11/01/2016)  Wound Debridement (Left) (11/01/2016)  Application of skin substitute graft to face, scalp, eyelids, mouth, neck, ears, orbits, genitalia, hands, feet, and/or multiple digits, total wound surface area greater than or equal to 100 sq cm; each additional 100 sq cm wound surface area, or part the (09/20/2016)  Application of skin substitute graft to face, scalp, eyelids, mouth, neck, ears, orbits, genitalia, hands, feet, and/or multiple digits, total wound surface area greater than or equal to 100 sq cm; first 100 sq cm wound surface area, or 1% of body area of (09/20/2016)  Autologous platelet rich plasma for chronic wounds/ulcers, including phlebotomy, centrifugation, and all other preparatory procedures, administration and dressings, per treatment (09/20/2016)  Debridement, muscle and/or fascia (includes epidermis, dermis, and subcutaneous tissue, if performed); each additional 20 sq cm, or part thereof (List separately in addition to code for primary procedure) (09/20/2016)  Debridement, muscle and/or fascia (includes epidermis, dermis, and subcutaneous tissue, if  performed); each additional 20 sq cm, or part thereof (List separately in addition to code for primary procedure) (09/20/2016)  Debridement, muscle and/or fascia (includes epidermis, dermis, and subcutaneous tissue, if performed); each additional 20 sq cm, or part thereof (List separately in addition to code for primary procedure) (09/20/2016)  Debridement, muscle and/or fascia (includes epidermis, dermis, and subcutaneous tissue, if performed); each additional 20 sq cm, or part thereof (List separately in addition to code for primary procedure) (09/20/2016)  Debridement, muscle and/or fascia (includes epidermis, dermis, and subcutaneous tissue, if performed); each additional 20 sq cm, or part thereof (List separately in addition to code for primary procedure) (09/20/2016)  Debridement, muscle and/or fascia (includes epidermis, dermis, and subcutaneous tissue, if performed); first 20 sq cm or less (09/20/2016)  Excision of Left Foot Tendon, Open Approach (09/20/2016)  Replacement of Left Foot Skin with Nonautologous Tissue Substitute, Full Thickness, External Approach (09/20/2016)  Wound Debridement (Left) (09/20/2016)  Application of skin substitute graft to face, scalp, eyelids, mouth, neck, ears, orbits, genitalia, hands, feet, and/or multiple digits, total wound surface area greater than or equal to 100 sq cm; each additional 100 sq cm wound surface area, or part the (07/22/2016)  Application of skin substitute graft to face, scalp, eyelids, mouth, neck, ears, orbits, genitalia, hands, feet, and/or multiple digits, total wound surface area greater than or equal to 100 sq cm; first 100 sq cm wound surface area, or 1% of body area of (07/22/2016)  Debridement Foot (Left, Foot) (07/22/2016)  Excision of Left Foot Tendon, Open Approach (07/22/2016)  Replacement of Left Foot Skin with Nonautologous Tissue Substitute, Full Thickness, External Approach (07/22/2016)  Surgical preparation or creation of recipient site by  excision of open wounds, burn eschar, or scar (including subcutaneous tissues), or incisional release of scar contracture, face, scalp, eyelids, mouth, neck, ears, orbits, genitalia, hands, feet and/or (07/22/2016)  Surgical preparation or creation of recipient site by excision of open wounds, burn eschar, or scar (including subcutaneous tissues), or incisional release of scar contracture, face, scalp, eyelids, mouth, neck, ears, orbits, genitalia, hands, feet and/or (07/22/2016)  Amputation Foot (05/24/2016)  Excision of Left Tarsal, Open Approach (05/24/2016)  Wound Debridement (Left) (05/17/2016)  Debridement, bone (includes epidermis, dermis, subcutaneous tissue, muscle and/or fascia, if performed); first 20 sq cm or less (03/29/2016)  Excision of Left Tarsal, Open Approach (03/29/2016)  Full thickness graft, free, including direct closure of donor site, forehead, cheeks, chin, mouth, neck, axillae, genitalia, hands, and/or feet; 20 sq cm or less (03/29/2016)  Replacement of Left Foot Subcutaneous Tissue and Fascia with Nonautologous Tissue Substitute, Percutaneous Approach (03/29/2016)  Wound Debridement (Left) (03/29/2016)  Debridement Foot (Left) (02/19/2016)  Excision of Left Foot Subcutaneous Tissue and Fascia, Open Approach (02/19/2016)  Supplement of Left Foot Subcutaneous Tissue and Fascia with Synthetic Substitute, Open Approach (02/19/2016)  Dilation of Left Peroneal Artery with Intraluminal Device, Percutaneous Approach (02/12/2016)  Dilation of Left Posterior Tibial Artery with Intraluminal Device, Percutaneous Approach (02/12/2016)  Extirpation of Matter from Left Peroneal Artery, Percutaneous Approach (02/12/2016)  Extirpation of Matter from Left Posterior Tibial Artery, Percutaneous Approach (02/12/2016)  Fluoroscopy of Aorta and Bilateral Lower Extremity Arteries using Low Osmolar Contrast (02/12/2016)  Debridement Foot (Left) (02/10/2016)  Excision of Left Foot Subcutaneous Tissue and Fascia,  Open Approach (02/10/2016)  Excision of Left Tarsal, Percutaneous Approach, Diagnostic (02/10/2016)  Supplement of Left Foot Subcutaneous Tissue and Fascia with Synthetic Substitute, Open Approach (02/10/2016)  Debridement Foot (Left) (02/05/2016)  Excision of Left Foot Tendon, Open Approach (02/05/2016)  Destruction of chorioretinal lesion by laser photocoagulation (03/17/2015)  Other mechanical vitrectomy (03/17/2015)  Vitrectomy (Left) (03/17/2015)  Vitrectomy, mechanical, pars plana approach; with endolaser panretinal photocoagulation (03/17/2015)  After-cataract  Angiogram  Angioplasty of artery of lower extremity  Appendectomy  Appendectomy  Carpal tunnel decompression  Carpal tunnel release  Cataract extraction and insertion of intraocular lens  Circumcision  Circumcision  I and D  knee surgery  ORIF - Open reduction and internal fixation of fracture  orif right foot  Phacoemulsification of cataract with intraocular lens implantation  PTA   Medications  Inpatient  Dextrose 50% and Water (50 mL vial/syringe), 12.5 gm= 25 mL, IV, As Directed, PRN  Floxin Otic 0.3% otic solution, 1 drop(s), Otic, Once-Unscheduled  insulin lispro 100 units/mL subcutaneous injection, 2-9 units, Subcutaneous, As Directed, PRN  IVF D5 Lactated Ringers LR Infusion 1,000 mL, 1000 mL, IV  Home  aspirin 81 mg oral tablet, 81 mg= 1 tab(s), Oral, Daily  atorvastatin 40 mg oral tablet, 40 mg= 1 tab(s), Oral, Daily  famotidine 20 mg oral tablet, 20 mg= 1 tab(s), Oral, BID  gabapentin 100 mg oral capsule, 100 mg= 1 cap(s), Oral, BID  glipiZIDE 10 mg oral tablet, 10 mg= 1 tab(s), Oral, BID  Humalog 100 U/mL (per 1 unit), 25 units, Subcutaneous, TIDAC  Lantus 100 units/mL subcutaneous inj., 70 units, Subcutaneous, BID  metoprolol succinate 50 mg oral tablet extended release, 50 mg= 1 tab(s), Oral, Daily  Norvasc 5 mg oral tablet, 5 mg= 1 tab(s), Oral, Daily  Plavix 75 mg oral tablet, 75 mg= 1 tab(s), Oral, Daily  SPIRONOLACT TAB 25MG, 25 mg= 1  tab(s), Oral, Daily  tamsulosin 0.4 mg oral capsule, 0.4 mg= 1 cap(s), Oral, Daily  torsemide 20 mg oral tablet, 20 mg= 1 tab(s), Oral, Daily  Trulicity Pen 0.75 mg/0.5 mL subcutaneous solution, 0.75 mg= 0.5 mL, Subcutaneous, qWeek  Allergies  tobramycin?(Anaphylaxis)  Social History  Alcohol - Denies Alcohol Use, 02/04/2016  Past, 03/10/2015  Employment/School - Not employed or in school, 02/04/2016  Previous employment/school: 8th grade education., 07/04/2017  Exercise  Exercise type: upper body exercise., 03/18/2015  Home/Environment - Low Risk, 02/04/2016  Lives with Spouse. Living situation: Home/Independent. Home equipment: Glucose monitoring, crutches. Alcohol abuse in household: No. Substance abuse in household: No. Smoker in household: No. Injuries/Abuse/Neglect in household: No. Feels unsafe at home: No. Family/Friends available for support: Yes. Major illness in household: No. Financial concerns: No. TV/Computer concerns: No., 03/18/2015  Nutrition/Health  Diabetic, Caffeine intake amount: 1 cup coffee daily. Vitamin/Supplements: vitamins. Sleeping concerns: No. Feels highly stressed: No., 03/18/2015  Substance Abuse - Denies Substance Abuse, 02/04/2016  Tobacco - Denies Tobacco Use, 02/04/2016  Never smoker, 03/10/2015  Family History  Cardiac arrhythmia.: Father.  Diabetes mellitus type 1.: Mother.  Heart disease: Father.  Heart murmur.: Father.  Hyperlipidemia.: Father.  Hypertension.: Mother and Father.  Metastatic cancer: Mother.  Immunizations  Vaccine Date Status   influenza virus vaccine, inactivated 02/01/2017 Given       AS: EAC edematous, barely able to fit pediatric speculum into ear, unable to visualize the TM

## 2022-05-02 NOTE — HISTORICAL [facility] CERNER
This is a historical note converted from [employer]. Formatting and pictures may have been removed.  Please reference [employer] for original formatting and attached multimedia. History of Present Illness  67 year old male undergoing hyperbarics for foot. Had one treatment with severe left otalgia and hearing loss. Already had hearing loss on the right. No otorrhea. No vertigo. Occasional nonpulsatile tinnitus. History of loud noise exposure from work. No ear surgeries. No recent audiogram.  ?   8/14/18: Here today for b/l PETs. Unable to place L PET 2/2 extremely narrowed and tortuous L EAC.  ?   8/21/18: Here today for re-attempt of Left PET. Reinaldo fell out of left ear on Sunday  Review of Systems  General: denies fatigue, unintentional weight loss, fever or chills  Eyes: denies vision change  Ears: denies change in hearing, otorrhea, otalgia, tinnitus, or vertigo  Nose: denies rhinorrhea, nasal congestion, sneezing  Mouth: denies sore throat  Neck: denies new neck mass or lymphadenopathy  Respiratory: denies cough, dyspnea  Cardiovascular: denies chest pain  GI: denies dysphagia, or nausea  Skin: denies rash or changing skin lesion  Endocrine: denies heat or cold intolerance  Neurologic: denies headache, syncope, motor or sensory deficit  Psych: denies anxiety or depression  Physical Exam  General: Awake, alert and oriented. No acute distress  Head: Normocephalic and atraumatic  Eyes: Pupils are equal and reactive to light and accommodation. EOMI  Ears: AD: EAC with cotton inside canal, unable to visualize TM  AS: EAC extremely narrowed and tortuous, some squamous debris present, unable to see TM  Nose: External examination reveals no abnormalities. Septum midline and no sites of nasal obstruction.  Oral cavity/Oropharynx: Floor of mouth is soft. No posterior pharyngeal erythema.  Neck: Soft and supple. No palpable adenopathy or other neck mass.  Chest: Symmetric excursion bilaterally. There is no respiratory  distress.  Cardiovascular: 2+ radial pulses bilaterally. Regular rate and rhythm.  Integument: No rashes or other skin lesions visualized.  Neurologic: Cranial nerves II - XII are intact.  ?  ?  Audio:  Right ear: ??Moderate to severe mixed hearing loss from 250-8kHz. Speech and discrimination scores are in agreement with puretone findings.  ?  Left ear:??Mild to severe (possibly) mixed hearing loss from 250-8kHz. ?Note, true bone conduction scores could not be obtained due to the masking dilemma. ?Speech and discrimination scores are in agreement with puretone findings.  Right Type A; Left Type B  Assessment/Plan  Ordered:  MD to Nurse, 08/20/18 12:49:00 CDT, pt to hold plavix today until after surgery  To OR today for L PET, and EUA of R  RTC in 3 weeks for tube check  OK to resume hyperbarics ASAP   Problem List/Past Medical History  Ongoing  Arthritis  Barotrauma  CHF, acute  CKD - chronic kidney disease  Constipation  Diabetes  Dyslipidemia  Elevated serum cholesterol  Foot infection  Hearing loss  Hypertension  Impaired mobility  Kidney failure, acute  Neuropathy  Osteomyelitis due to type 2 diabetes mellitus  PAD (peripheral artery disease)  Pneumonia  PVD (peripheral vascular disease)  Shortness of breath  Ulcer of foot, chronic  Ulcer of heel  VH - Vitreous hemorrhage  Historical  Cataract  Procedure/Surgical History  Drainage of Right Middle Ear with Drainage Device, Open Approach (08/14/2018)  Inspection of Left Ear, Via Natural or Artificial Opening Endoscopic (08/14/2018)  Myringotomy With Tubes (None) (08/14/2018)  Otolaryngologic examination under general anesthesia (08/14/2018)  Tympanostomy (requiring insertion of ventilating tube), general anesthesia (08/14/2018)  Application of skin substitute graft to face, scalp, eyelids, mouth, neck, ears, orbits, genitalia, hands, feet, and/or multiple digits, total wound surface area up to 100 sq cm; each additional 25 sq cm wound surface area, or part  thereof (List separatel (01/30/2018)  Application of skin substitute graft to face, scalp, eyelids, mouth, neck, ears, orbits, genitalia, hands, feet, and/or multiple digits, total wound surface area up to 100 sq cm; each additional 25 sq cm wound surface area, or part thereof (List separatel (01/30/2018)  Application of skin substitute graft to face, scalp, eyelids, mouth, neck, ears, orbits, genitalia, hands, feet, and/or multiple digits, total wound surface area up to 100 sq cm; each additional 25 sq cm wound surface area, or part thereof (List separatel (01/30/2018)  Application of skin substitute graft to face, scalp, eyelids, mouth, neck, ears, orbits, genitalia, hands, feet, and/or multiple digits, total wound surface area up to 100 sq cm; first 25 sq cm or less wound surface area (01/30/2018)  Excision of Left Foot Subcutaneous Tissue and Fascia, Open Approach (01/30/2018)  Excision of Right Foot Subcutaneous Tissue and Fascia, Open Approach (01/30/2018)  Replacement of Left Foot Skin with Nonautologous Tissue Substitute, Full Thickness, External Approach (01/30/2018)  Replacement of Right Foot Skin with Nonautologous Tissue Substitute, Full Thickness, External Approach (01/30/2018)  Surgical preparation or creation of recipient site by excision of open wounds, burn eschar, or scar (including subcutaneous tissues), or incisional release of scar contracture, face, scalp, eyelids, mouth, neck, ears, orbits, genitalia, hands, feet and/or (01/30/2018)  Wound Debridement (Bilateral) (01/30/2018)  Application of skin substitute graft to face, scalp, eyelids, mouth, neck, ears, orbits, genitalia, hands, feet, and/or multiple digits, total wound surface area up to 100 sq cm; first 25 sq cm or less wound surface area (11/21/2017)  Replacement of Left Foot Subcutaneous Tissue and Fascia with Nonautologous Tissue Substitute, Percutaneous Approach (11/21/2017)  Wound Debridement (Left) (11/21/2017)  Application of skin  substitute graft to face, scalp, eyelids, mouth, neck, ears, orbits, genitalia, hands, feet, and/or multiple digits, total wound surface area up to 100 sq cm; first 25 sq cm or less wound surface area (05/30/2017)  Excision of Left Foot Muscle, Open Approach (05/30/2017)  Replacement of Left Foot Skin with Synthetic Substitute, Full Thickness, External Approach (05/30/2017)  Wound Debridement (Left) (05/30/2017)  Fluoroscopy of Multiple Coronary Arteries using Low Osmolar Contrast (02/09/2017)  Measurement of Cardiac Sampling and Pressure, Left Heart, Percutaneous Approach (02/09/2017)  Transfusion of Nonautologous Red Blood Cells into Peripheral Vein, Percutaneous Approach (02/06/2017)  Application of skin substitute graft to face, scalp, eyelids, mouth, neck, ears, orbits, genitalia, hands, feet, and/or multiple digits, total wound surface area greater than or equal to 100 sq cm; each additional 100 sq cm wound surface area, or part the (01/20/2017)  Application of skin substitute graft to face, scalp, eyelids, mouth, neck, ears, orbits, genitalia, hands, feet, and/or multiple digits, total wound surface area greater than or equal to 100 sq cm; first 100 sq cm wound surface area, or 1% of body area of (01/20/2017)  Excision of Left Foot Bursa and Ligament, Open Approach (01/20/2017)  Replacement of Left Foot Skin with Nonautologous Tissue Substitute, Full Thickness, External Approach (01/20/2017)  Surgical preparation or creation of recipient site by excision of open wounds, burn eschar, or scar (including subcutaneous tissues), or incisional release of scar contracture, face, scalp, eyelids, mouth, neck, ears, orbits, genitalia, hands, feet and/or (01/20/2017)  Surgical preparation or creation of recipient site by excision of open wounds, burn eschar, or scar (including subcutaneous tissues), or incisional release of scar contracture, face, scalp, eyelids, mouth, neck, ears, orbits, genitalia, hands, feet and/or  (01/20/2017)  Wound Debridement (Left, Foot) (01/20/2017)  Insertion of Infusion Device into Superior Vena Cava, Percutaneous Approach (01/13/2017)  Insertion of peripherally inserted central venous catheter (PICC), without subcutaneous port or pump; age 5 years or older (01/13/2017)  Insertion of Infusion Device into Right Internal Jugular Vein, Percutaneous Approach (01/09/2017)  Insertion of peripherally inserted central venous catheter (PICC), without subcutaneous port or pump; age 5 years or older (01/09/2017)  Debridement (eg, high pressure waterjet with/without suction, sharp selective debridement with scissors, scalpel and forceps), open wound, (eg, fibrin, devitalized epidermis and/or dermis, exudate, debris, biofilm), including topical application(s), wound (12/22/2016)  Debridement (eg, high pressure waterjet with/without suction, sharp selective debridement with scissors, scalpel and forceps), open wound, (eg, fibrin, devitalized epidermis and/or dermis, exudate, debris, biofilm), including topical application(s), wound (12/22/2016)  Debridement (eg, high pressure waterjet with/without suction, sharp selective debridement with scissors, scalpel and forceps), open wound, (eg, fibrin, devitalized epidermis and/or dermis, exudate, debris, biofilm), including topical application(s), wound (12/22/2016)  Debridement (eg, high pressure waterjet with/without suction, sharp selective debridement with scissors, scalpel and forceps), open wound, (eg, fibrin, devitalized epidermis and/or dermis, exudate, debris, biofilm), including topical application(s), wound (12/22/2016)  Debridement (eg, high pressure waterjet with/without suction, sharp selective debridement with scissors, scalpel and forceps), open wound, (eg, fibrin, devitalized epidermis and/or dermis, exudate, debris, biofilm), including topical application(s), wound (12/22/2016)  Extraction of Left Foot Skin, External Approach (12/22/2016)  Excision of  Left Foot Subcutaneous Tissue and Fascia, Open Approach (12/09/2016)  Full thickness graft, free, including direct closure of donor site, forehead, cheeks, chin, mouth, neck, axillae, genitalia, hands, and/or feet; 20 sq cm or less (12/09/2016)  Replacement of Left Foot Skin with Nonautologous Tissue Substitute, Full Thickness, External Approach (12/09/2016)  Wound Debridement (Left) (12/09/2016)  Debridement, muscle and/or fascia (includes epidermis, dermis, and subcutaneous tissue, if performed); first 20 sq cm or less (11/01/2016)  Excision of Left Foot Subcutaneous Tissue and Fascia, Open Approach (11/01/2016)  Full thickness graft, free, including direct closure of donor site, forehead, cheeks, chin, mouth, neck, axillae, genitalia, hands, and/or feet; 20 sq cm or less (11/01/2016)  Replacement of Left Foot Skin with Nonautologous Tissue Substitute, Full Thickness, External Approach (11/01/2016)  Wound Debridement (Left) (11/01/2016)  Application of skin substitute graft to face, scalp, eyelids, mouth, neck, ears, orbits, genitalia, hands, feet, and/or multiple digits, total wound surface area greater than or equal to 100 sq cm; each additional 100 sq cm wound surface area, or part the (09/20/2016)  Application of skin substitute graft to face, scalp, eyelids, mouth, neck, ears, orbits, genitalia, hands, feet, and/or multiple digits, total wound surface area greater than or equal to 100 sq cm; first 100 sq cm wound surface area, or 1% of body area of (09/20/2016)  Autologous platelet rich plasma for chronic wounds/ulcers, including phlebotomy, centrifugation, and all other preparatory procedures, administration and dressings, per treatment (09/20/2016)  Debridement, muscle and/or fascia (includes epidermis, dermis, and subcutaneous tissue, if performed); each additional 20 sq cm, or part thereof (List separately in addition to code for primary procedure) (09/20/2016)  Debridement, muscle and/or fascia (includes  epidermis, dermis, and subcutaneous tissue, if performed); each additional 20 sq cm, or part thereof (List separately in addition to code for primary procedure) (09/20/2016)  Debridement, muscle and/or fascia (includes epidermis, dermis, and subcutaneous tissue, if performed); each additional 20 sq cm, or part thereof (List separately in addition to code for primary procedure) (09/20/2016)  Debridement, muscle and/or fascia (includes epidermis, dermis, and subcutaneous tissue, if performed); each additional 20 sq cm, or part thereof (List separately in addition to code for primary procedure) (09/20/2016)  Debridement, muscle and/or fascia (includes epidermis, dermis, and subcutaneous tissue, if performed); each additional 20 sq cm, or part thereof (List separately in addition to code for primary procedure) (09/20/2016)  Debridement, muscle and/or fascia (includes epidermis, dermis, and subcutaneous tissue, if performed); first 20 sq cm or less (09/20/2016)  Excision of Left Foot Tendon, Open Approach (09/20/2016)  Replacement of Left Foot Skin with Nonautologous Tissue Substitute, Full Thickness, External Approach (09/20/2016)  Wound Debridement (Left) (09/20/2016)  Application of skin substitute graft to face, scalp, eyelids, mouth, neck, ears, orbits, genitalia, hands, feet, and/or multiple digits, total wound surface area greater than or equal to 100 sq cm; each additional 100 sq cm wound surface area, or part the (07/22/2016)  Application of skin substitute graft to face, scalp, eyelids, mouth, neck, ears, orbits, genitalia, hands, feet, and/or multiple digits, total wound surface area greater than or equal to 100 sq cm; first 100 sq cm wound surface area, or 1% of body area of (07/22/2016)  Debridement Foot (Left, Foot) (07/22/2016)  Excision of Left Foot Tendon, Open Approach (07/22/2016)  Replacement of Left Foot Skin with Nonautologous Tissue Substitute, Full Thickness, External Approach  (07/22/2016)  Surgical preparation or creation of recipient site by excision of open wounds, burn eschar, or scar (including subcutaneous tissues), or incisional release of scar contracture, face, scalp, eyelids, mouth, neck, ears, orbits, genitalia, hands, feet and/or (07/22/2016)  Surgical preparation or creation of recipient site by excision of open wounds, burn eschar, or scar (including subcutaneous tissues), or incisional release of scar contracture, face, scalp, eyelids, mouth, neck, ears, orbits, genitalia, hands, feet and/or (07/22/2016)  Amputation Foot (05/24/2016)  Excision of Left Tarsal, Open Approach (05/24/2016)  Wound Debridement (Left) (05/17/2016)  Debridement, bone (includes epidermis, dermis, subcutaneous tissue, muscle and/or fascia, if performed); first 20 sq cm or less (03/29/2016)  Excision of Left Tarsal, Open Approach (03/29/2016)  Full thickness graft, free, including direct closure of donor site, forehead, cheeks, chin, mouth, neck, axillae, genitalia, hands, and/or feet; 20 sq cm or less (03/29/2016)  Replacement of Left Foot Subcutaneous Tissue and Fascia with Nonautologous Tissue Substitute, Percutaneous Approach (03/29/2016)  Wound Debridement (Left) (03/29/2016)  Debridement Foot (Left) (02/19/2016)  Excision of Left Foot Subcutaneous Tissue and Fascia, Open Approach (02/19/2016)  Supplement of Left Foot Subcutaneous Tissue and Fascia with Synthetic Substitute, Open Approach (02/19/2016)  Dilation of Left Peroneal Artery with Intraluminal Device, Percutaneous Approach (02/12/2016)  Dilation of Left Posterior Tibial Artery with Intraluminal Device, Percutaneous Approach (02/12/2016)  Extirpation of Matter from Left Peroneal Artery, Percutaneous Approach (02/12/2016)  Extirpation of Matter from Left Posterior Tibial Artery, Percutaneous Approach (02/12/2016)  Fluoroscopy of Aorta and Bilateral Lower Extremity Arteries using Low Osmolar Contrast (02/12/2016)  Debridement Foot (Left)  (02/10/2016)  Excision of Left Foot Subcutaneous Tissue and Fascia, Open Approach (02/10/2016)  Excision of Left Tarsal, Percutaneous Approach, Diagnostic (02/10/2016)  Supplement of Left Foot Subcutaneous Tissue and Fascia with Synthetic Substitute, Open Approach (02/10/2016)  Debridement Foot (Left) (02/05/2016)  Excision of Left Foot Tendon, Open Approach (02/05/2016)  Destruction of chorioretinal lesion by laser photocoagulation (03/17/2015)  Other mechanical vitrectomy (03/17/2015)  Vitrectomy (Left) (03/17/2015)  Vitrectomy, mechanical, pars plana approach; with endolaser panretinal photocoagulation (03/17/2015)  After-cataract  Angiogram  Angioplasty of artery of lower extremity  Appendectomy  Appendectomy  Carpal tunnel decompression  Carpal tunnel release  Cataract extraction and insertion of intraocular lens  Circumcision  Circumcision  I and D  knee surgery  ORIF - Open reduction and internal fixation of fracture  orif right foot  Phacoemulsification of cataract with intraocular lens implantation  PTA   Medications  Inpatient  Dextrose 50% and Water (50 mL vial/syringe), 12.5 gm= 25 mL, IV, As Directed, PRN  insulin lispro 100 units/mL subcutaneous injection, 2-9 units, Subcutaneous, As Directed, PRN  IVF D5 Lactated Ringers LR Infusion 1,000 mL, 1000 mL, IV  Home  AMOX/K CLAV -125, 1 tab(s), Oral, BID,? ?Not Taking, Completed Rx  aspirin 81 mg oral tablet, 81 mg= 1 tab(s), Oral, Daily  atorvastatin 40 mg oral tablet, 40 mg= 1 tab(s), Oral, Daily  CIPROFLOXACN TAB 500MG, 500 mg= 1 tab(s), Oral, BID  famotidine 20 mg oral tablet, 20 mg= 1 tab(s), Oral, BID  gabapentin 100 mg oral capsule, 100 mg= 1 cap(s), Oral, BID  glipiZIDE 10 mg oral tablet, 10 mg= 1 tab(s), Oral, BID  Humalog 100 U/mL (per 1 unit), 25 units, Subcutaneous, TIDAC  hydrocortisone/neomycin/polymyxin B 1%-0.35%-10,000 units/mL otic solution, 2 drop(s), Ear-Both, QID  Lantus 100 units/mL subcutaneous inj., 70 units, Subcutaneous,  BID  LEVOFLOXACIN TAB 500MG, 500 mg= 1 tab(s), Oral, Daily,? ?Not Taking, Completed Rx  LINEZOLID TAB 600MG, 600 mg= 1 tab(s), Oral, BID,? ?Not Taking, Completed Rx  LOSARTAN POT TAB 25MG, 25 mg= 1 tab(s), Oral, Daily  metoprolol succinate 50 mg oral tablet extended release, 50 mg= 1 tab(s), Oral, Daily  Norvasc 5 mg oral tablet, 5 mg= 1 tab(s), Oral, Daily  Plavix 75 mg oral tablet, 75 mg= 1 tab(s), Oral, Daily  POT CL MICRO TAB 20MEQ ER, 20 mEq= 1 tab(s), Oral, Daily,? ?Not taking  SMZ/TMP DS -160, 1 tab(s), Oral, BID,? ?Not Taking, Completed Rx  SPIRONOLACT TAB 25MG, 25 mg= 1 tab(s), Oral, Daily  tamsulosin 0.4 mg oral capsule, 0.4 mg= 1 cap(s), Oral, Daily  torsemide 20 mg oral tablet, 20 mg= 1 tab(s), Oral, Daily  Trulicity Pen 0.75 mg/0.5 mL subcutaneous solution, 0.75 mg= 0.5 mL, Subcutaneous, qWeek  Allergies  tobramycin?(Anaphylaxis)  Social History  Alcohol - Denies Alcohol Use, 02/04/2016  Past, 03/10/2015  Employment/School - Not employed or in school, 02/04/2016  Previous employment/school: 8th grade education., 07/04/2017  Exercise  Exercise type: upper body exercise., 03/18/2015  Home/Environment - Low Risk, 02/04/2016  Lives with Spouse. Living situation: Home/Independent. Home equipment: Glucose monitoring, crutches. Alcohol abuse in household: No. Substance abuse in household: No. Smoker in household: No. Injuries/Abuse/Neglect in household: No. Feels unsafe at home: No. Family/Friends available for support: Yes. Major illness in household: No. Financial concerns: No. TV/Computer concerns: No., 03/18/2015  Nutrition/Health  Diabetic, Caffeine intake amount: 1 cup coffee daily. Vitamin/Supplements: vitamins. Sleeping concerns: No. Feels highly stressed: No., 03/18/2015  Substance Abuse - Denies Substance Abuse, 02/04/2016  Tobacco - Denies Tobacco Use, 02/04/2016  Never smoker, 03/10/2015  Family History  Cardiac arrhythmia.: Father.  Diabetes mellitus type 1.: Mother.  Heart disease:  Father.  Heart murmur.: Father.  Hyperlipidemia.: Father.  Hypertension.: Mother and Father.  Metastatic cancer: Mother.  Immunizations  Vaccine Date Status   influenza virus vaccine, inactivated 02/01/2017 Given

## 2023-01-08 RX ORDER — AMIODARONE HYDROCHLORIDE 100 MG/1
TABLET ORAL DAILY
COMMUNITY

## 2023-01-08 RX ORDER — INSULIN LISPRO 100 [IU]/ML
50 INJECTION, SOLUTION INTRAVENOUS; SUBCUTANEOUS
COMMUNITY

## 2023-01-08 RX ORDER — FLUTICASONE PROPIONATE 50 MCG
2 SPRAY, SUSPENSION (ML) NASAL DAILY
COMMUNITY

## 2023-01-08 RX ORDER — PREDNISONE 10 MG/1
10 TABLET ORAL 2 TIMES DAILY
COMMUNITY

## 2023-01-08 RX ORDER — ROSUVASTATIN CALCIUM 40 MG/1
40 TABLET, COATED ORAL NIGHTLY
COMMUNITY

## 2023-01-08 RX ORDER — FERROUS GLUCONATE 324(38)MG
324 TABLET ORAL
COMMUNITY

## 2023-01-08 RX ORDER — LEVOTHYROXINE SODIUM 75 UG/1
75 TABLET ORAL
COMMUNITY

## 2023-01-08 RX ORDER — AMLODIPINE BESYLATE 5 MG/1
5 TABLET ORAL DAILY
COMMUNITY

## 2023-01-08 RX ORDER — FUROSEMIDE 80 MG/1
80 TABLET ORAL 2 TIMES DAILY
COMMUNITY

## 2023-01-08 RX ORDER — TAMSULOSIN HYDROCHLORIDE 0.4 MG/1
0.4 CAPSULE ORAL DAILY
COMMUNITY

## 2023-01-08 RX ORDER — POTASSIUM CHLORIDE 750 MG/1
20 CAPSULE, EXTENDED RELEASE ORAL ONCE
COMMUNITY

## 2023-01-08 RX ORDER — PREGABALIN 50 MG/1
50 CAPSULE ORAL 3 TIMES DAILY
COMMUNITY

## 2023-01-08 RX ORDER — LISINOPRIL 5 MG/1
5 TABLET ORAL DAILY
COMMUNITY

## 2023-01-11 NOTE — DISCHARGE INSTRUCTIONS
Patient Education       Debridement Discharge Instructions        What care is needed at home?   Do not lift anything over 10 pounds (4.5 kg).  Ask your doctor when you may go back to your normal activities like work, driving, or sex.  Be sure to wash your hands before and after touching your wound or dressing.  Try not to rub or scratch the healing skin.  Do not smoke. It will prevent healing and can increase your risk for infection.  Get plenty of rest and follow a healthy diet.  7.   Follow your doctor's instructions about your dressings/bandages.   8.   You may take a shower when released by your doctor.  What follow-up care is needed?   Be sure to keep your follow up visit.  Will physical activity be limited?   Physical activity may be limited based on the type of wound. Talk with your doctor about the right amount of activity for you.  What changes to diet are needed?   Ask your doctor if you should eat a special diet. A healthy diet helps wounds to heal better.  What problems could happen?   Bleeding  Infection  Scarring  Poor healing  When do I need to call the doctor?   Signs of infection. These include a fever of 100.4°F (38°C) or higher, chills, or wound that will not heal.  Signs of wound infection. These include swelling, redness, warmth around the wound; too much pain when touched; yellowish, greenish, or bloody discharge; foul smell coming from the cut site; cut site opens up.  Signs of poor healing. This could appear as chalky white, blue, or black appearance to tissue around the wound.  Drugs for pain are not working for you  Too much itching at wound site  A rash at the wound site

## 2023-01-13 ENCOUNTER — ANESTHESIA EVENT (OUTPATIENT)
Dept: SURGERY | Facility: HOSPITAL | Age: 73
End: 2023-01-13
Payer: MEDICARE

## 2023-01-13 ENCOUNTER — ANESTHESIA (OUTPATIENT)
Dept: SURGERY | Facility: HOSPITAL | Age: 73
End: 2023-01-13
Payer: MEDICARE

## 2023-01-13 ENCOUNTER — HOSPITAL ENCOUNTER (OUTPATIENT)
Facility: HOSPITAL | Age: 73
Discharge: HOME | End: 2023-01-13
Attending: PODIATRIST | Admitting: PODIATRIST
Payer: MEDICARE

## 2023-01-13 DIAGNOSIS — L97.512 RIGHT FOOT ULCER, WITH FAT LAYER EXPOSED: Primary | ICD-10-CM

## 2023-01-13 LAB — POCT GLUCOSE: 183 MG/DL (ref 70–110)

## 2023-01-13 PROCEDURE — 37000009 HC ANESTHESIA EA ADD 15 MINS: Performed by: PODIATRIST

## 2023-01-13 PROCEDURE — 37000008 HC ANESTHESIA 1ST 15 MINUTES: Performed by: PODIATRIST

## 2023-01-13 PROCEDURE — 36000706: Performed by: PODIATRIST

## 2023-01-13 PROCEDURE — A6011 COLLAGEN GEL/PASTE WOUND FIL: HCPCS | Performed by: PODIATRIST

## 2023-01-13 PROCEDURE — 63600175 PHARM REV CODE 636 W HCPCS: Performed by: NURSE ANESTHETIST, CERTIFIED REGISTERED

## 2023-01-13 PROCEDURE — 36000707: Performed by: PODIATRIST

## 2023-01-13 PROCEDURE — 71000016 HC POSTOP RECOV ADDL HR: Performed by: PODIATRIST

## 2023-01-13 PROCEDURE — 71000015 HC POSTOP RECOV 1ST HR: Performed by: PODIATRIST

## 2023-01-13 DEVICE — ALLOGRAFT THERASKIN LG 3X6: Type: IMPLANTABLE DEVICE | Site: FOOT | Status: FUNCTIONAL

## 2023-01-13 RX ORDER — PROPOFOL 10 MG/ML
VIAL (ML) INTRAVENOUS
Status: DISCONTINUED | OUTPATIENT
Start: 2023-01-13 | End: 2023-01-13

## 2023-01-13 RX ORDER — BUPIVACAINE HYDROCHLORIDE 5 MG/ML
INJECTION, SOLUTION EPIDURAL; INTRACAUDAL
Status: DISCONTINUED
Start: 2023-01-13 | End: 2023-01-13 | Stop reason: HOSPADM

## 2023-01-13 RX ORDER — HYDROCODONE BITARTRATE AND ACETAMINOPHEN 5; 325 MG/1; MG/1
1 TABLET ORAL EVERY 6 HOURS PRN
Qty: 20 TABLET | Refills: 0 | Status: SHIPPED | OUTPATIENT
Start: 2023-01-13

## 2023-01-13 RX ORDER — FENTANYL CITRATE 50 UG/ML
INJECTION, SOLUTION INTRAMUSCULAR; INTRAVENOUS
Status: DISCONTINUED | OUTPATIENT
Start: 2023-01-13 | End: 2023-01-13

## 2023-01-13 RX ORDER — CEPHALEXIN 500 MG/1
500 CAPSULE ORAL 4 TIMES DAILY
Qty: 20 CAPSULE | Refills: 0 | Status: SHIPPED | OUTPATIENT
Start: 2023-01-13

## 2023-01-13 RX ORDER — PROPOFOL 10 MG/ML
VIAL (ML) INTRAVENOUS CONTINUOUS PRN
Status: DISCONTINUED | OUTPATIENT
Start: 2023-01-13 | End: 2023-01-13

## 2023-01-13 RX ADMIN — FENTANYL CITRATE 25 MCG: 50 INJECTION, SOLUTION INTRAMUSCULAR; INTRAVENOUS at 10:01

## 2023-01-13 RX ADMIN — SODIUM CHLORIDE, POTASSIUM CHLORIDE, SODIUM LACTATE AND CALCIUM CHLORIDE: 600; 310; 30; 20 INJECTION, SOLUTION INTRAVENOUS at 09:01

## 2023-01-13 RX ADMIN — PROPOFOL 25 MCG/KG/MIN: 10 INJECTION, EMULSION INTRAVENOUS at 10:01

## 2023-01-13 NOTE — ANESTHESIA POSTPROCEDURE EVALUATION
Anesthesia Post Evaluation    Patient: Landon Aragon    Procedure(s) Performed: Procedure(s) (LRB):  DEBRIDEMENT, FOOT Right (Right)  APPLICATION, GRAFT Right Theraskin Graft  Benn[place]vy Jean - (Right)    Final Anesthesia Type: MAC      Patient location during evaluation: OPS  Patient participation: Yes- Able to Participate  Level of consciousness: awake and alert and oriented  Post-procedure vital signs: reviewed and stable  Airway patency: patent    PONV status at discharge: No PONV  Anesthetic complications: no      Cardiovascular status: blood pressure returned to baseline  Respiratory status: unassisted  Hydration status: euvolemic  Follow-up not needed.          Vitals Value Taken Time   /84 01/13/23 1103   Temp 36.9 °C (98.4 °F) 01/13/23 1103   Pulse 60 01/13/23 1103   Resp 16 01/13/23 1103   SpO2 98 % 01/13/23 1103         No case tracking events are documented in the log.      Pain/Hugo Score: No data recorded

## 2023-01-13 NOTE — OP NOTE
Date: 1/13/23    Surgeon: Landon Armas DPM    Preoperative Diagnosis:  Nonhealing wound right lower extremity    Postoperative Diagnosis:  Same    Pathology:  None    Procedure:  Excisional full-thickness debridement including subcutaneous tissue with application of TheraSkin graft    Anesthesia:  Local with sedation    Hemostasis:  None    EBL:  20 cc    Surgical Technique: On the above mentioned date the patient was deemed satisfactory for surgery.  The patient was placed on the operative table in a supine position and anesthesia was administered.  After adequate levels of anesthesia was administered a regional field block was given and the patients foot was returned to the operative site for the following procedure.    Procedure #1:  Attention was directed to the right lower extremity where the multiple ulcerations were identified.  On the dorsal aspect of the foot there is a 5 x 3.5 pre and post debridement with an increase in depth from 0.1-0.2 cm on the lateral aspect of the right foot there is a 8 x 5 cm lesion pre and post debridement with an increase in depth from 0.1-0.2 cm and on the calf there is a 14 x 8 cm wound with the increase of depth from 0.1-0.2 cm pre to postoperatively.  Each lesion was debrided with a ultrasonic debrider.  Once down to good bleeding skin to 6 x 3 TheraSkin grafts were applied to the wounds and stapled into place.  A 1 Cellerate collagen granular layers were placed over the wound as well.  The areas were then sterilely bandaged.      In summary patient tolerated anesthesia and procedure well and left operating room with vital signs stable and vascular status intact.  Patient was transported to the recovery room for continued monitoring keratome criteria for discharge.

## 2023-01-13 NOTE — ANESTHESIA PREPROCEDURE EVALUATION
01/13/2023  Landon Aragon is a 72 y.o., male.  DEBRIDEMENT, FOOT Right (Right: Foot)      Pre-op Assessment    I have reviewed the Patient Summary Reports.     I have reviewed the Nursing Notes. I have reviewed the NPO Status.   I have reviewed the Medications.     Review of Systems  Anesthesia Hx:  No problems with previous Anesthesia    Hematology/Oncology:  Hematology Normal   Oncology Normal     EENT/Dental:EENT/Dental Normal   Cardiovascular:   Exercise tolerance: poor Hypertension CHF PVD  Functional Capacity good / => 4 METS    Pulmonary:  Pulmonary Normal    Renal/:   Denies Chronic Renal Disease.     Hepatic/GI:  Hepatic/GI Normal    Musculoskeletal:  Musculoskeletal Normal    Neurological:  Neurology Normal    Endocrine:   Diabetes  Morbid Obesity / BMI > 40  Dermatological:  Skin Normal    Psych:  Psychiatric Normal           Physical Exam  General: Alert, Oriented, Well nourished and Cooperative    Airway:  Mallampati: II   Mouth Opening: Normal  TM Distance: Normal  Tongue: Normal  Neck ROM: Normal ROM    Dental:  Intact    Chest/Lungs:  Clear to auscultation, Normal Respiratory Rate    Heart:  Rate: Normal  Rhythm: Regular Rhythm        Anesthesia Plan  Type of Anesthesia, risks & benefits discussed:    Anesthesia Type: MAC  Intra-op Monitoring Plan: Standard ASA Monitors  Post Op Pain Control Plan: multimodal analgesia  Induction:  IV  Airway Plan: Direct  Informed Consent: Informed consent signed with the Patient and all parties understand the risks and agree with anesthesia plan.  All questions answered. Patient consented to blood products? Yes  ASA Score: 4  Day of Surgery Review of History & Physical: H&P Update referred to the surgeon/provider.    Ready For Surgery From Anesthesia Perspective.     .

## 2023-01-13 NOTE — DISCHARGE SUMMARY
[facility] Surgical - Periop Services  Discharge Note  Short Stay    Procedure(s) (LRB):  DEBRIDEMENT, FOOT Right (Right)  APPLICATION, GRAFT Right Theraskin Graft  [facility] - (Right)      OUTCOME: Patient tolerated treatment/procedure well without complication and is now ready for discharge.    DISPOSITION: Home or Self Care    FINAL DIAGNOSIS:  <principal problem not specified>    FOLLOWUP: In clinic    DISCHARGE INSTRUCTIONS:    Discharge Procedure Orders   Diet diabetic     Leave dressing on - Keep it clean, dry, and intact until clinic visit     Activity as tolerated        TIME SPENT ON DISCHARGE: 20   minutes

## 2023-01-14 VITALS
DIASTOLIC BLOOD PRESSURE: 84 MMHG | RESPIRATION RATE: 16 BRPM | WEIGHT: 315 LBS | OXYGEN SATURATION: 98 % | BODY MASS INDEX: 45.1 KG/M2 | SYSTOLIC BLOOD PRESSURE: 156 MMHG | TEMPERATURE: 98 F | HEART RATE: 60 BPM | HEIGHT: 70 IN

## 2023-03-01 ENCOUNTER — LAB REQUISITION (OUTPATIENT)
Dept: LAB | Facility: HOSPITAL | Age: 73
End: 2023-03-01
Payer: MEDICARE

## 2023-03-01 DIAGNOSIS — K21.9 GASTRO-ESOPHAGEAL REFLUX DISEASE WITHOUT ESOPHAGITIS: ICD-10-CM

## 2023-03-01 DIAGNOSIS — E03.9 HYPOTHYROIDISM, UNSPECIFIED: ICD-10-CM

## 2023-03-01 LAB
ALBUMIN SERPL-MCNC: 2.4 G/DL (ref 3.4–4.8)
ALBUMIN/GLOB SERPL: 0.7 RATIO (ref 1.1–2)
ALP SERPL-CCNC: 115 UNIT/L (ref 40–150)
ALT SERPL-CCNC: 17 UNIT/L (ref 0–55)
ANISOCYTOSIS BLD QL SMEAR: ABNORMAL
AST SERPL-CCNC: 16 UNIT/L (ref 5–34)
BASOPHILS # BLD AUTO: 0.05 X10(3)/MCL (ref 0–0.2)
BASOPHILS NFR BLD AUTO: 0.6 %
BILIRUBIN DIRECT+TOT PNL SERPL-MCNC: 0.6 MG/DL
BUN SERPL-MCNC: 15.1 MG/DL (ref 8.4–25.7)
CALCIUM SERPL-MCNC: 8 MG/DL (ref 8.8–10)
CHLORIDE SERPL-SCNC: 103 MMOL/L (ref 98–107)
CO2 SERPL-SCNC: 26 MMOL/L (ref 23–31)
CREAT SERPL-MCNC: 1.05 MG/DL (ref 0.73–1.18)
EOSINOPHIL # BLD AUTO: 0.27 X10(3)/MCL (ref 0–0.9)
EOSINOPHIL NFR BLD AUTO: 3.2 %
ERYTHROCYTE [DISTWIDTH] IN BLOOD BY AUTOMATED COUNT: 19.9 % (ref 11.5–17)
GFR SERPLBLD CREATININE-BSD FMLA CKD-EPI: >60 MLS/MIN/1.73/M2
GLOBULIN SER-MCNC: 3.6 GM/DL (ref 2.4–3.5)
GLUCOSE SERPL-MCNC: 176 MG/DL (ref 82–115)
HCT VFR BLD AUTO: 29.5 % (ref 42–52)
HGB BLD-MCNC: 8.8 G/DL (ref 14–18)
HYPOCHROMIA BLD QL SMEAR: ABNORMAL
IMM GRANULOCYTES # BLD AUTO: 0.13 X10(3)/MCL (ref 0–0.04)
IMM GRANULOCYTES NFR BLD AUTO: 1.5 %
LYMPHOCYTES # BLD AUTO: 2.3 X10(3)/MCL (ref 0.6–4.6)
LYMPHOCYTES NFR BLD AUTO: 27.2 %
MACROCYTES BLD QL SMEAR: SLIGHT
MCH RBC QN AUTO: 24 PG
MCHC RBC AUTO-ENTMCNC: 29.8 G/DL (ref 33–36)
MCV RBC AUTO: 80.6 FL (ref 80–94)
MICROCYTES BLD QL SMEAR: ABNORMAL
MONOCYTES # BLD AUTO: 0.79 X10(3)/MCL (ref 0.1–1.3)
MONOCYTES NFR BLD AUTO: 9.3 %
NEUTROPHILS # BLD AUTO: 4.91 X10(3)/MCL (ref 2.1–9.2)
NEUTROPHILS NFR BLD AUTO: 58.2 %
NRBC BLD AUTO-RTO: 0 %
PLATELET # BLD AUTO: 307 X10(3)/MCL (ref 130–400)
PLATELET # BLD EST: ADEQUATE 10*3/UL
PMV BLD AUTO: 9.4 FL (ref 7.4–10.4)
POIKILOCYTOSIS BLD QL SMEAR: SLIGHT
POTASSIUM SERPL-SCNC: 3.6 MMOL/L (ref 3.5–5.1)
PROT SERPL-MCNC: 6 GM/DL (ref 5.8–7.6)
RBC # BLD AUTO: 3.66 X10(6)/MCL (ref 4.7–6.1)
RBC MORPH BLD: ABNORMAL
SODIUM SERPL-SCNC: 136 MMOL/L (ref 136–145)
WBC # SPEC AUTO: 8.5 X10(3)/MCL (ref 4.5–11.5)

## 2023-03-01 PROCEDURE — 80053 COMPREHEN METABOLIC PANEL: CPT | Performed by: NURSE PRACTITIONER

## 2023-03-01 PROCEDURE — 85025 COMPLETE CBC W/AUTO DIFF WBC: CPT | Performed by: NURSE PRACTITIONER

## 2023-03-07 ENCOUNTER — LAB REQUISITION (OUTPATIENT)
Dept: LAB | Facility: HOSPITAL | Age: 73
End: 2023-03-07
Payer: MEDICARE

## 2023-03-07 DIAGNOSIS — E11.22 TYPE 2 DIABETES MELLITUS WITH DIABETIC CHRONIC KIDNEY DISEASE: ICD-10-CM

## 2023-03-07 PROCEDURE — 87045 FECES CULTURE AEROBIC BACT: CPT | Performed by: NURSE PRACTITIONER

## 2023-03-07 PROCEDURE — 87077 CULTURE AEROBIC IDENTIFY: CPT | Performed by: NURSE PRACTITIONER

## 2023-03-09 LAB
BACTERIA STL CULT: ABNORMAL
BACTERIA STL CULT: ABNORMAL

## 2023-05-01 ENCOUNTER — LAB REQUISITION (OUTPATIENT)
Dept: LAB | Facility: HOSPITAL | Age: 73
End: 2023-05-01
Payer: MEDICARE

## 2023-05-01 DIAGNOSIS — N18.30 CHRONIC KIDNEY DISEASE, STAGE 3 UNSPECIFIED: ICD-10-CM

## 2023-05-01 DIAGNOSIS — E11.22 TYPE 2 DIABETES MELLITUS WITH DIABETIC CHRONIC KIDNEY DISEASE: ICD-10-CM

## 2023-05-01 LAB
ALBUMIN SERPL-MCNC: 2.4 G/DL (ref 3.4–4.8)
ALBUMIN/GLOB SERPL: 0.6 RATIO (ref 1.1–2)
ALP SERPL-CCNC: 117 UNIT/L (ref 40–150)
ALT SERPL-CCNC: 13 UNIT/L (ref 0–55)
ANISOCYTOSIS BLD QL SMEAR: ABNORMAL
AST SERPL-CCNC: 14 UNIT/L (ref 5–34)
BASOPHILS # BLD AUTO: 0.06 X10(3)/MCL (ref 0–0.2)
BASOPHILS NFR BLD AUTO: 0.7 %
BILIRUBIN DIRECT+TOT PNL SERPL-MCNC: 0.4 MG/DL
BUN SERPL-MCNC: 14.9 MG/DL (ref 8.4–25.7)
CALCIUM SERPL-MCNC: 8.3 MG/DL (ref 8.8–10)
CHLORIDE SERPL-SCNC: 99 MMOL/L (ref 98–107)
CHOLEST SERPL-MCNC: 87 MG/DL
CHOLEST/HDLC SERPL: 3 {RATIO} (ref 0–5)
CO2 SERPL-SCNC: 31 MMOL/L (ref 23–31)
CREAT SERPL-MCNC: 1.08 MG/DL (ref 0.73–1.18)
DEPRECATED CALCIDIOL+CALCIFEROL SERPL-MC: 22.5 NG/ML (ref 30–80)
EOSINOPHIL # BLD AUTO: 0.74 X10(3)/MCL (ref 0–0.9)
EOSINOPHIL NFR BLD AUTO: 8.7 %
ERYTHROCYTE [DISTWIDTH] IN BLOOD BY AUTOMATED COUNT: 17.5 % (ref 11.5–17)
EST. AVERAGE GLUCOSE BLD GHB EST-MCNC: 177.2 MG/DL
GFR SERPLBLD CREATININE-BSD FMLA CKD-EPI: >60 MLS/MIN/1.73/M2
GLOBULIN SER-MCNC: 4.1 GM/DL (ref 2.4–3.5)
GLUCOSE SERPL-MCNC: 134 MG/DL (ref 82–115)
HBA1C MFR BLD: 7.8 %
HCT VFR BLD AUTO: 28.4 % (ref 42–52)
HDLC SERPL-MCNC: 28 MG/DL (ref 35–60)
HGB BLD-MCNC: 8.5 G/DL (ref 14–18)
HYPOCHROMIA BLD QL SMEAR: ABNORMAL
IMM GRANULOCYTES # BLD AUTO: 0.05 X10(3)/MCL (ref 0–0.04)
IMM GRANULOCYTES NFR BLD AUTO: 0.6 %
LDLC SERPL CALC-MCNC: 41 MG/DL (ref 50–140)
LYMPHOCYTES # BLD AUTO: 3.48 X10(3)/MCL (ref 0.6–4.6)
LYMPHOCYTES NFR BLD AUTO: 40.7 %
MCH RBC QN AUTO: 26 PG (ref 27–31)
MCHC RBC AUTO-ENTMCNC: 29.9 G/DL (ref 33–36)
MCV RBC AUTO: 86.9 FL (ref 80–94)
MONOCYTES # BLD AUTO: 0.72 X10(3)/MCL (ref 0.1–1.3)
MONOCYTES NFR BLD AUTO: 8.4 %
NEUTROPHILS # BLD AUTO: 3.49 X10(3)/MCL (ref 2.1–9.2)
NEUTROPHILS NFR BLD AUTO: 40.9 %
NRBC BLD AUTO-RTO: 0 %
PLATELET # BLD AUTO: 248 X10(3)/MCL (ref 130–400)
PLATELET # BLD EST: ADEQUATE 10*3/UL
PMV BLD AUTO: 9.8 FL (ref 7.4–10.4)
POTASSIUM SERPL-SCNC: 3.3 MMOL/L (ref 3.5–5.1)
PREALB SERPL-MCNC: 14.1 MG/DL (ref 16–42)
PROT SERPL-MCNC: 6.5 GM/DL (ref 5.8–7.6)
RBC # BLD AUTO: 3.27 X10(6)/MCL (ref 4.7–6.1)
RBC MORPH BLD: ABNORMAL
SODIUM SERPL-SCNC: 139 MMOL/L (ref 136–145)
T4 FREE SERPL-MCNC: 1.79 NG/DL (ref 0.7–1.48)
TRIGL SERPL-MCNC: 88 MG/DL (ref 34–140)
TSH SERPL-ACNC: 15.27 UIU/ML (ref 0.35–4.94)
VLDLC SERPL CALC-MCNC: 18 MG/DL
WBC # SPEC AUTO: 8.5 X10(3)/MCL (ref 4.5–11.5)

## 2023-05-01 PROCEDURE — 84439 ASSAY OF FREE THYROXINE: CPT | Performed by: INTERNAL MEDICINE

## 2023-05-01 PROCEDURE — 80061 LIPID PANEL: CPT | Performed by: INTERNAL MEDICINE

## 2023-05-01 PROCEDURE — 85025 COMPLETE CBC W/AUTO DIFF WBC: CPT | Performed by: INTERNAL MEDICINE

## 2023-05-01 PROCEDURE — 82306 VITAMIN D 25 HYDROXY: CPT | Performed by: INTERNAL MEDICINE

## 2023-05-01 PROCEDURE — 80053 COMPREHEN METABOLIC PANEL: CPT | Performed by: INTERNAL MEDICINE

## 2023-05-01 PROCEDURE — 83036 HEMOGLOBIN GLYCOSYLATED A1C: CPT | Performed by: INTERNAL MEDICINE

## 2023-05-01 PROCEDURE — 84134 ASSAY OF PREALBUMIN: CPT | Performed by: INTERNAL MEDICINE

## 2023-05-01 PROCEDURE — 84443 ASSAY THYROID STIM HORMONE: CPT | Performed by: INTERNAL MEDICINE

## 2023-05-08 ENCOUNTER — LAB REQUISITION (OUTPATIENT)
Dept: LAB | Facility: HOSPITAL | Age: 73
End: 2023-05-08
Payer: MEDICARE

## 2023-05-08 DIAGNOSIS — D64.9 ANEMIA, UNSPECIFIED: ICD-10-CM

## 2023-05-08 DIAGNOSIS — I50.9 HEART FAILURE, UNSPECIFIED: ICD-10-CM

## 2023-05-08 LAB
ANION GAP SERPL CALC-SCNC: 6 MEQ/L
ANISOCYTOSIS BLD QL SMEAR: ABNORMAL
BASOPHILS # BLD AUTO: 0.04 X10(3)/MCL
BASOPHILS NFR BLD AUTO: 0.4 %
BUN SERPL-MCNC: 14.2 MG/DL (ref 8.4–25.7)
CALCIUM SERPL-MCNC: 8 MG/DL (ref 8.8–10)
CHLORIDE SERPL-SCNC: 107 MMOL/L (ref 98–107)
CO2 SERPL-SCNC: 28 MMOL/L (ref 23–31)
CREAT SERPL-MCNC: 1.03 MG/DL (ref 0.73–1.18)
CREAT/UREA NIT SERPL: 14
EOSINOPHIL # BLD AUTO: 0.56 X10(3)/MCL (ref 0–0.9)
EOSINOPHIL NFR BLD AUTO: 5.7 %
ERYTHROCYTE [DISTWIDTH] IN BLOOD BY AUTOMATED COUNT: 17.7 % (ref 11.5–17)
GFR SERPLBLD CREATININE-BSD FMLA CKD-EPI: >60 MLS/MIN/1.73/M2
GLUCOSE SERPL-MCNC: 108 MG/DL (ref 82–115)
HCT VFR BLD AUTO: 26.2 % (ref 42–52)
HGB BLD-MCNC: 7.7 G/DL (ref 14–18)
HYPOCHROMIA BLD QL SMEAR: ABNORMAL
IMM GRANULOCYTES # BLD AUTO: 0.05 X10(3)/MCL (ref 0–0.04)
IMM GRANULOCYTES NFR BLD AUTO: 0.5 %
LYMPHOCYTES # BLD AUTO: 3.36 X10(3)/MCL (ref 0.6–4.6)
LYMPHOCYTES NFR BLD AUTO: 34.2 %
MAGNESIUM SERPL-MCNC: 1.6 MG/DL (ref 1.6–2.6)
MCH RBC QN AUTO: 25.5 PG (ref 27–31)
MCHC RBC AUTO-ENTMCNC: 29.4 G/DL (ref 33–36)
MCV RBC AUTO: 86.8 FL (ref 80–94)
MONOCYTES # BLD AUTO: 0.83 X10(3)/MCL (ref 0.1–1.3)
MONOCYTES NFR BLD AUTO: 8.4 %
NEUTROPHILS # BLD AUTO: 4.99 X10(3)/MCL (ref 2.1–9.2)
NEUTROPHILS NFR BLD AUTO: 50.8 %
NRBC BLD AUTO-RTO: 0 %
PLATELET # BLD AUTO: 286 X10(3)/MCL (ref 130–400)
PLATELET # BLD EST: NORMAL 10*3/UL
PMV BLD AUTO: 10.1 FL (ref 7.4–10.4)
POTASSIUM SERPL-SCNC: 3.1 MMOL/L (ref 3.5–5.1)
RBC # BLD AUTO: 3.02 X10(6)/MCL (ref 4.7–6.1)
RBC MORPH BLD: ABNORMAL
SODIUM SERPL-SCNC: 141 MMOL/L (ref 136–145)
WBC # SPEC AUTO: 9.83 X10(3)/MCL (ref 4.5–11.5)

## 2023-05-08 PROCEDURE — 80048 BASIC METABOLIC PNL TOTAL CA: CPT | Performed by: NURSE PRACTITIONER

## 2023-05-08 PROCEDURE — 85025 COMPLETE CBC W/AUTO DIFF WBC: CPT | Performed by: NURSE PRACTITIONER

## 2023-05-08 PROCEDURE — 83735 ASSAY OF MAGNESIUM: CPT | Performed by: NURSE PRACTITIONER

## 2023-05-10 ENCOUNTER — LAB REQUISITION (OUTPATIENT)
Dept: LAB | Facility: HOSPITAL | Age: 73
End: 2023-05-10
Payer: MEDICARE

## 2023-05-10 DIAGNOSIS — N18.30 CHRONIC KIDNEY DISEASE, STAGE 3 UNSPECIFIED: ICD-10-CM

## 2023-05-10 LAB
BASOPHILS # BLD AUTO: 0.04 X10(3)/MCL
BASOPHILS NFR BLD AUTO: 0.4 %
EOSINOPHIL # BLD AUTO: 0.48 X10(3)/MCL (ref 0–0.9)
EOSINOPHIL NFR BLD AUTO: 4.9 %
ERYTHROCYTE [DISTWIDTH] IN BLOOD BY AUTOMATED COUNT: 17.7 % (ref 11.5–17)
HCT VFR BLD AUTO: 26.6 % (ref 42–52)
HGB BLD-MCNC: 8 G/DL (ref 14–18)
IMM GRANULOCYTES # BLD AUTO: 0.03 X10(3)/MCL (ref 0–0.04)
IMM GRANULOCYTES NFR BLD AUTO: 0.3 %
LYMPHOCYTES # BLD AUTO: 3.68 X10(3)/MCL (ref 0.6–4.6)
LYMPHOCYTES NFR BLD AUTO: 37.6 %
MCH RBC QN AUTO: 25.9 PG (ref 27–31)
MCHC RBC AUTO-ENTMCNC: 30.1 G/DL (ref 33–36)
MCV RBC AUTO: 86.1 FL (ref 80–94)
MONOCYTES # BLD AUTO: 0.76 X10(3)/MCL (ref 0.1–1.3)
MONOCYTES NFR BLD AUTO: 7.8 %
NEUTROPHILS # BLD AUTO: 4.79 X10(3)/MCL (ref 2.1–9.2)
NEUTROPHILS NFR BLD AUTO: 49 %
NRBC BLD AUTO-RTO: 0 %
PLATELET # BLD AUTO: 281 X10(3)/MCL (ref 130–400)
PMV BLD AUTO: 9.6 FL (ref 7.4–10.4)
RBC # BLD AUTO: 3.09 X10(6)/MCL (ref 4.7–6.1)
WBC # SPEC AUTO: 9.78 X10(3)/MCL (ref 4.5–11.5)

## 2023-05-10 PROCEDURE — 85025 COMPLETE CBC W/AUTO DIFF WBC: CPT | Performed by: INTERNAL MEDICINE

## 2024-06-13 ENCOUNTER — LAB REQUISITION (OUTPATIENT)
Dept: LAB | Facility: HOSPITAL | Age: 74
End: 2024-06-13
Payer: MEDICARE

## 2024-06-13 DIAGNOSIS — E11.42 TYPE 2 DIABETES MELLITUS WITH DIABETIC POLYNEUROPATHY: ICD-10-CM

## 2024-06-13 LAB
ALBUMIN SERPL-MCNC: 3.4 G/DL (ref 3.4–4.8)
ALBUMIN/GLOB SERPL: 1 RATIO (ref 1.1–2)
ALP SERPL-CCNC: 78 UNIT/L (ref 40–150)
ALT SERPL-CCNC: 12 UNIT/L (ref 0–55)
ANION GAP SERPL CALC-SCNC: 8 MEQ/L
AST SERPL-CCNC: 12 UNIT/L (ref 5–34)
BASOPHILS # BLD AUTO: 0.02 X10(3)/MCL
BASOPHILS NFR BLD AUTO: 0.3 %
BILIRUB SERPL-MCNC: 0.6 MG/DL
BNP BLD-MCNC: 448.7 PG/ML
BUN SERPL-MCNC: 60.7 MG/DL (ref 8.4–25.7)
CALCIUM SERPL-MCNC: 8.9 MG/DL (ref 8.8–10)
CHLORIDE SERPL-SCNC: 107 MMOL/L (ref 98–107)
CK SERPL-CCNC: 58 U/L (ref 30–200)
CO2 SERPL-SCNC: 30 MMOL/L (ref 23–31)
CREAT SERPL-MCNC: 1.61 MG/DL (ref 0.73–1.18)
CREAT/UREA NIT SERPL: 38
EOSINOPHIL # BLD AUTO: 0.37 X10(3)/MCL (ref 0–0.9)
EOSINOPHIL NFR BLD AUTO: 5 %
ERYTHROCYTE [DISTWIDTH] IN BLOOD BY AUTOMATED COUNT: 19.7 % (ref 11.5–17)
GFR SERPLBLD CREATININE-BSD FMLA CKD-EPI: 45 ML/MIN/1.73/M2
GLOBULIN SER-MCNC: 3.5 GM/DL (ref 2.4–3.5)
GLUCOSE SERPL-MCNC: 44 MG/DL (ref 82–115)
HCT VFR BLD AUTO: 33 % (ref 42–52)
HGB BLD-MCNC: 9.4 G/DL (ref 14–18)
IMM GRANULOCYTES # BLD AUTO: 0.01 X10(3)/MCL (ref 0–0.04)
IMM GRANULOCYTES NFR BLD AUTO: 0.1 %
LYMPHOCYTES # BLD AUTO: 1.52 X10(3)/MCL (ref 0.6–4.6)
LYMPHOCYTES NFR BLD AUTO: 20.4 %
MCH RBC QN AUTO: 22.3 PG (ref 27–31)
MCHC RBC AUTO-ENTMCNC: 28.5 G/DL (ref 33–36)
MCV RBC AUTO: 78.4 FL (ref 80–94)
MONOCYTES # BLD AUTO: 0.74 X10(3)/MCL (ref 0.1–1.3)
MONOCYTES NFR BLD AUTO: 9.9 %
NEUTROPHILS # BLD AUTO: 4.8 X10(3)/MCL (ref 2.1–9.2)
NEUTROPHILS NFR BLD AUTO: 64.3 %
PLATELET # BLD AUTO: 188 X10(3)/MCL (ref 130–400)
PMV BLD AUTO: 10.6 FL (ref 7.4–10.4)
POTASSIUM SERPL-SCNC: 4.3 MMOL/L (ref 3.5–5.1)
PROT SERPL-MCNC: 6.9 GM/DL (ref 5.8–7.6)
RBC # BLD AUTO: 4.21 X10(6)/MCL (ref 4.7–6.1)
SODIUM SERPL-SCNC: 145 MMOL/L (ref 136–145)
TROPONIN I SERPL-MCNC: <0.01 NG/ML (ref 0–0.04)
WBC # SPEC AUTO: 7.46 X10(3)/MCL (ref 4.5–11.5)

## 2024-06-13 PROCEDURE — 82550 ASSAY OF CK (CPK): CPT | Performed by: NURSE PRACTITIONER

## 2024-06-13 PROCEDURE — 85025 COMPLETE CBC W/AUTO DIFF WBC: CPT | Performed by: NURSE PRACTITIONER

## 2024-06-13 PROCEDURE — 83880 ASSAY OF NATRIURETIC PEPTIDE: CPT | Performed by: NURSE PRACTITIONER

## 2024-06-13 PROCEDURE — 80053 COMPREHEN METABOLIC PANEL: CPT | Performed by: NURSE PRACTITIONER

## 2024-06-13 PROCEDURE — 84484 ASSAY OF TROPONIN QUANT: CPT | Performed by: NURSE PRACTITIONER

## 2024-11-21 ENCOUNTER — HOSPITAL ENCOUNTER (INPATIENT)
Facility: HOSPITAL | Age: 74
LOS: 8 days | Discharge: NURSING, REHAB OR LONG-TERM CARE | DRG: 665 | End: 2024-11-29
Attending: EMERGENCY MEDICINE | Admitting: INTERNAL MEDICINE
Payer: MEDICARE

## 2024-11-21 DIAGNOSIS — D64.9 ANEMIA, UNSPECIFIED TYPE: ICD-10-CM

## 2024-11-21 DIAGNOSIS — N48.89 PENILE BLEEDING: Primary | ICD-10-CM

## 2024-11-21 DIAGNOSIS — R07.9 CHEST PAIN: ICD-10-CM

## 2024-11-21 DIAGNOSIS — R31.9 HEMATURIA, UNSPECIFIED TYPE: ICD-10-CM

## 2024-11-21 LAB
ALBUMIN SERPL-MCNC: 2.5 G/DL (ref 3.4–4.8)
ALBUMIN/GLOB SERPL: 0.5 RATIO (ref 1.1–2)
ALP SERPL-CCNC: 169 UNIT/L (ref 40–150)
ALT SERPL-CCNC: 18 UNIT/L (ref 0–55)
ANION GAP SERPL CALC-SCNC: 7 MEQ/L
APTT PPP: 31.6 SECONDS (ref 23.2–33.7)
AST SERPL-CCNC: 15 UNIT/L (ref 5–34)
BASOPHILS # BLD AUTO: 0.06 X10(3)/MCL
BASOPHILS NFR BLD AUTO: 0.4 %
BILIRUB SERPL-MCNC: 0.5 MG/DL
BUN SERPL-MCNC: 46.2 MG/DL (ref 8.4–25.7)
CALCIUM SERPL-MCNC: 8.4 MG/DL (ref 8.8–10)
CHLORIDE SERPL-SCNC: 100 MMOL/L (ref 98–107)
CO2 SERPL-SCNC: 26 MMOL/L (ref 23–31)
CREAT SERPL-MCNC: 1.88 MG/DL (ref 0.72–1.25)
CREAT/UREA NIT SERPL: 25
EOSINOPHIL # BLD AUTO: 0.67 X10(3)/MCL (ref 0–0.9)
EOSINOPHIL NFR BLD AUTO: 4.4 %
ERYTHROCYTE [DISTWIDTH] IN BLOOD BY AUTOMATED COUNT: 17.5 % (ref 11.5–17)
EST. AVERAGE GLUCOSE BLD GHB EST-MCNC: 168.6 MG/DL
GFR SERPLBLD CREATININE-BSD FMLA CKD-EPI: 37 ML/MIN/1.73/M2
GLOBULIN SER-MCNC: 4.7 GM/DL (ref 2.4–3.5)
GLUCOSE SERPL-MCNC: 311 MG/DL (ref 82–115)
GROUP & RH: NORMAL
HBA1C MFR BLD: 7.5 %
HCT VFR BLD AUTO: 24.3 % (ref 42–52)
HCT VFR BLD AUTO: 25.4 % (ref 42–52)
HCT VFR BLD AUTO: 26.2 % (ref 42–52)
HGB BLD-MCNC: 7.6 G/DL (ref 14–18)
HGB BLD-MCNC: 8 G/DL (ref 14–18)
HGB BLD-MCNC: 8.1 G/DL (ref 14–18)
IMM GRANULOCYTES # BLD AUTO: 0.07 X10(3)/MCL (ref 0–0.04)
IMM GRANULOCYTES NFR BLD AUTO: 0.5 %
INDIRECT COOMBS: NORMAL
INR PPP: 1.4
LYMPHOCYTES # BLD AUTO: 4.17 X10(3)/MCL (ref 0.6–4.6)
LYMPHOCYTES NFR BLD AUTO: 27.7 %
MCH RBC QN AUTO: 25.8 PG (ref 27–31)
MCHC RBC AUTO-ENTMCNC: 30.9 G/DL (ref 33–36)
MCV RBC AUTO: 83.4 FL (ref 80–94)
MONOCYTES # BLD AUTO: 1.34 X10(3)/MCL (ref 0.1–1.3)
MONOCYTES NFR BLD AUTO: 8.9 %
NEUTROPHILS # BLD AUTO: 8.77 X10(3)/MCL (ref 2.1–9.2)
NEUTROPHILS NFR BLD AUTO: 58.1 %
NRBC BLD AUTO-RTO: 0 %
PLATELET # BLD AUTO: 372 X10(3)/MCL (ref 130–400)
PMV BLD AUTO: 10.7 FL (ref 7.4–10.4)
POCT GLUCOSE: 364 MG/DL (ref 70–110)
POTASSIUM SERPL-SCNC: 4.8 MMOL/L (ref 3.5–5.1)
PROT SERPL-MCNC: 7.2 GM/DL (ref 5.8–7.6)
PROTHROMBIN TIME: 17.6 SECONDS (ref 12.5–14.5)
RBC # BLD AUTO: 3.14 X10(6)/MCL (ref 4.7–6.1)
SODIUM SERPL-SCNC: 133 MMOL/L (ref 136–145)
SPECIMEN OUTDATE: NORMAL
WBC # BLD AUTO: 15.08 X10(3)/MCL (ref 4.5–11.5)

## 2024-11-21 PROCEDURE — 85610 PROTHROMBIN TIME: CPT | Performed by: EMERGENCY MEDICINE

## 2024-11-21 PROCEDURE — 80053 COMPREHEN METABOLIC PANEL: CPT | Performed by: EMERGENCY MEDICINE

## 2024-11-21 PROCEDURE — 36415 COLL VENOUS BLD VENIPUNCTURE: CPT | Performed by: NURSE PRACTITIONER

## 2024-11-21 PROCEDURE — 86900 BLOOD TYPING SEROLOGIC ABO: CPT | Performed by: EMERGENCY MEDICINE

## 2024-11-21 PROCEDURE — 99285 EMERGENCY DEPT VISIT HI MDM: CPT | Mod: 25

## 2024-11-21 PROCEDURE — 85018 HEMOGLOBIN: CPT | Performed by: NURSE PRACTITIONER

## 2024-11-21 PROCEDURE — 96360 HYDRATION IV INFUSION INIT: CPT

## 2024-11-21 PROCEDURE — 83036 HEMOGLOBIN GLYCOSYLATED A1C: CPT | Performed by: INTERNAL MEDICINE

## 2024-11-21 PROCEDURE — 85014 HEMATOCRIT: CPT | Performed by: EMERGENCY MEDICINE

## 2024-11-21 PROCEDURE — 11000001 HC ACUTE MED/SURG PRIVATE ROOM

## 2024-11-21 PROCEDURE — 85730 THROMBOPLASTIN TIME PARTIAL: CPT | Performed by: EMERGENCY MEDICINE

## 2024-11-21 PROCEDURE — 25000003 PHARM REV CODE 250: Performed by: INTERNAL MEDICINE

## 2024-11-21 PROCEDURE — 27000221 HC OXYGEN, UP TO 24 HOURS

## 2024-11-21 PROCEDURE — 85025 COMPLETE CBC W/AUTO DIFF WBC: CPT | Performed by: EMERGENCY MEDICINE

## 2024-11-21 PROCEDURE — 63600175 PHARM REV CODE 636 W HCPCS: Performed by: INTERNAL MEDICINE

## 2024-11-21 PROCEDURE — 51700 IRRIGATION OF BLADDER: CPT

## 2024-11-21 PROCEDURE — 25000003 PHARM REV CODE 250: Performed by: EMERGENCY MEDICINE

## 2024-11-21 RX ORDER — INSULIN ASPART 100 [IU]/ML
0-5 INJECTION, SOLUTION INTRAVENOUS; SUBCUTANEOUS
Status: DISCONTINUED | OUTPATIENT
Start: 2024-11-21 | End: 2024-11-22

## 2024-11-21 RX ORDER — INSULIN GLARGINE 100 [IU]/ML
40 INJECTION, SOLUTION SUBCUTANEOUS 2 TIMES DAILY
Status: DISCONTINUED | OUTPATIENT
Start: 2024-11-21 | End: 2024-11-22

## 2024-11-21 RX ORDER — FAMOTIDINE 20 MG/1
20 TABLET, FILM COATED ORAL DAILY
COMMUNITY

## 2024-11-21 RX ORDER — ATORVASTATIN CALCIUM 40 MG/1
40 TABLET, FILM COATED ORAL DAILY
Status: ON HOLD | COMMUNITY
End: 2024-11-29 | Stop reason: HOSPADM

## 2024-11-21 RX ORDER — LANOLIN ALCOHOL/MO/W.PET/CERES
1 CREAM (GRAM) TOPICAL DAILY
Status: DISCONTINUED | OUTPATIENT
Start: 2024-11-22 | End: 2024-11-29 | Stop reason: HOSPADM

## 2024-11-21 RX ORDER — DOCUSATE SODIUM 100 MG/1
100 CAPSULE, LIQUID FILLED ORAL 2 TIMES DAILY
COMMUNITY

## 2024-11-21 RX ORDER — TAMSULOSIN HYDROCHLORIDE 0.4 MG/1
0.4 CAPSULE ORAL DAILY
Status: DISCONTINUED | OUTPATIENT
Start: 2024-11-22 | End: 2024-11-29 | Stop reason: HOSPADM

## 2024-11-21 RX ORDER — DOCUSATE SODIUM 100 MG/1
100 CAPSULE, LIQUID FILLED ORAL 2 TIMES DAILY
Status: DISCONTINUED | OUTPATIENT
Start: 2024-11-21 | End: 2024-11-29 | Stop reason: HOSPADM

## 2024-11-21 RX ORDER — IBUPROFEN 200 MG
24 TABLET ORAL
Status: DISCONTINUED | OUTPATIENT
Start: 2024-11-21 | End: 2024-11-29 | Stop reason: HOSPADM

## 2024-11-21 RX ORDER — ACETAMINOPHEN 325 MG/1
650 TABLET ORAL EVERY 4 HOURS PRN
Status: DISCONTINUED | OUTPATIENT
Start: 2024-11-21 | End: 2024-11-29 | Stop reason: HOSPADM

## 2024-11-21 RX ORDER — AMOXICILLIN 250 MG
1 CAPSULE ORAL DAILY
COMMUNITY

## 2024-11-21 RX ORDER — PREGABALIN 50 MG/1
50 CAPSULE ORAL 3 TIMES DAILY
Status: DISCONTINUED | OUTPATIENT
Start: 2024-11-21 | End: 2024-11-29 | Stop reason: HOSPADM

## 2024-11-21 RX ORDER — IBUPROFEN 200 MG
16 TABLET ORAL
Status: DISCONTINUED | OUTPATIENT
Start: 2024-11-21 | End: 2024-11-29 | Stop reason: HOSPADM

## 2024-11-21 RX ORDER — NALOXONE HCL 0.4 MG/ML
0.02 VIAL (ML) INJECTION
Status: DISCONTINUED | OUTPATIENT
Start: 2024-11-21 | End: 2024-11-29 | Stop reason: HOSPADM

## 2024-11-21 RX ORDER — FUROSEMIDE 40 MG/1
40 TABLET ORAL DAILY
Status: ON HOLD | COMMUNITY
End: 2024-12-03 | Stop reason: CLARIF

## 2024-11-21 RX ORDER — ONDANSETRON HYDROCHLORIDE 2 MG/ML
4 INJECTION, SOLUTION INTRAVENOUS EVERY 4 HOURS PRN
Status: DISCONTINUED | OUTPATIENT
Start: 2024-11-21 | End: 2024-11-29 | Stop reason: HOSPADM

## 2024-11-21 RX ORDER — FLUTICASONE PROPIONATE 50 MCG
2 SPRAY, SUSPENSION (ML) NASAL DAILY
Status: DISCONTINUED | OUTPATIENT
Start: 2024-11-22 | End: 2024-11-29 | Stop reason: HOSPADM

## 2024-11-21 RX ORDER — INSULIN GLARGINE 100 [IU]/ML
25 INJECTION, SOLUTION SUBCUTANEOUS 2 TIMES DAILY
Status: DISCONTINUED | OUTPATIENT
Start: 2024-11-21 | End: 2024-11-21

## 2024-11-21 RX ORDER — SODIUM CHLORIDE 0.9 % (FLUSH) 0.9 %
10 SYRINGE (ML) INJECTION
Status: DISCONTINUED | OUTPATIENT
Start: 2024-11-21 | End: 2024-11-29 | Stop reason: HOSPADM

## 2024-11-21 RX ORDER — NAPROXEN SODIUM 220 MG/1
81 TABLET, FILM COATED ORAL DAILY
COMMUNITY

## 2024-11-21 RX ORDER — ACETAMINOPHEN 500 MG
1000 TABLET ORAL EVERY 6 HOURS PRN
Status: DISCONTINUED | OUTPATIENT
Start: 2024-11-21 | End: 2024-11-29 | Stop reason: HOSPADM

## 2024-11-21 RX ORDER — AMIODARONE HYDROCHLORIDE 100 MG/1
100 TABLET ORAL DAILY
Status: DISCONTINUED | OUTPATIENT
Start: 2024-11-22 | End: 2024-11-29 | Stop reason: HOSPADM

## 2024-11-21 RX ORDER — ATORVASTATIN CALCIUM 40 MG/1
40 TABLET, FILM COATED ORAL DAILY
Status: DISCONTINUED | OUTPATIENT
Start: 2024-11-22 | End: 2024-11-29 | Stop reason: HOSPADM

## 2024-11-21 RX ORDER — LEVOTHYROXINE SODIUM 150 UG/1
150 TABLET ORAL
Status: DISCONTINUED | OUTPATIENT
Start: 2024-11-22 | End: 2024-11-29 | Stop reason: HOSPADM

## 2024-11-21 RX ORDER — GLUCAGON 1 MG
1 KIT INJECTION
Status: DISCONTINUED | OUTPATIENT
Start: 2024-11-21 | End: 2024-11-29 | Stop reason: HOSPADM

## 2024-11-21 RX ORDER — FAMOTIDINE 20 MG/1
20 TABLET, FILM COATED ORAL DAILY
Status: DISCONTINUED | OUTPATIENT
Start: 2024-11-22 | End: 2024-11-29 | Stop reason: HOSPADM

## 2024-11-21 RX ORDER — PREGABALIN 75 MG/1
75 CAPSULE ORAL 3 TIMES DAILY
COMMUNITY

## 2024-11-21 RX ORDER — MUPIROCIN 20 MG/G
OINTMENT TOPICAL 2 TIMES DAILY
Status: DISPENSED | OUTPATIENT
Start: 2024-11-21 | End: 2024-11-26

## 2024-11-21 RX ORDER — PROCHLORPERAZINE EDISYLATE 5 MG/ML
5 INJECTION INTRAMUSCULAR; INTRAVENOUS EVERY 6 HOURS PRN
Status: DISCONTINUED | OUTPATIENT
Start: 2024-11-21 | End: 2024-11-29 | Stop reason: HOSPADM

## 2024-11-21 RX ORDER — SACUBITRIL AND VALSARTAN 24; 26 MG/1; MG/1
1 TABLET, FILM COATED ORAL 2 TIMES DAILY
COMMUNITY

## 2024-11-21 RX ADMIN — INSULIN GLARGINE 40 UNITS: 100 INJECTION, SOLUTION SUBCUTANEOUS at 08:11

## 2024-11-21 RX ADMIN — SODIUM CHLORIDE 1000 ML: 9 INJECTION, SOLUTION INTRAVENOUS at 11:11

## 2024-11-21 RX ADMIN — MUPIROCIN: 20 OINTMENT TOPICAL at 08:11

## 2024-11-21 RX ADMIN — DOCUSATE SODIUM 100 MG: 100 CAPSULE, LIQUID FILLED ORAL at 08:11

## 2024-11-21 RX ADMIN — PREGABALIN 50 MG: 50 CAPSULE ORAL at 08:11

## 2024-11-21 NOTE — H&P
[facility] Hospital Medicine History & Physical Examination       Patient Name: Landon Aragon  MRN: [id number]  Patient Class: IP- Inpatient   Admission Date: 11/21/2024   Admitting Physician: HM Service   Length of Stay: 0  Attending Physician: Shiv Faust MD  Primary Care Provider: Mat Taylor FNP  Face-to-Face encounter date: 11/21/2024  Code Status: Full  Chief Complaint: Penile Bleeding (Arrives via [facility] from [facility] with reports of penile bleeding. Recently had catheter for urinary retention that was changed yesterday. Reports that after catheter was changed he began with urinary retention with catheter in so Sharp was removed and the bleeding began. On Eliquis. )      Screening for Social Drivers for health:  Patient screened for food insecurity, housing instability, transportation needs, utility difficulties, and interpersonal safety (select all that apply as identified as concern)  []Housing or Food  []Transportation Needs  []Utility Difficulties  []Interpersonal safety  [x]None      Patient information was obtained from patient, patient's family, past medical records and ER records.     HISTORY OF PRESENT ILLNESS:   74-year-old male with known past medical history of congestive heart failure, diabetes mellitus type 2, right AKA and left BKA due to diabetic neuropathy, hard of hearing, hyperlipidemia, hypertension, peripheral arterial disease, currently at [facility] admitted 11/21/2024 for hematuria/penile bleeding after Sharp was exchanged yesterday at [facility], concern for traumatic injury.  In the ED patient had Sharp placed and CBI started.  Urology consulted.  Patient has no complaints currently, denies any pain.  Urine in Sharp bag is still red.  Informed patient that urology has been consulted and they will evaluate him soon.  Verbalized understanding  Did informed patient that will be will hold off his aspirin and Eliquis for now and resume other home  medication.  Labs reviewed, WBC 15 K, H&H 8.0/25.4, platelets 372, no left shift  PT INR 17.6/1.4  BMP shows sodium of 133, BUN creatinine 46.2/1.8, glucose 311, , AST/ALT within normal range,      PAST MEDICAL HISTORY:     Past Medical History:   Diagnosis Date    Anticoagulant long-term use     BPH (benign prostatic hyperplasia)     CHF (congestive heart failure)     Dependent for wheelchair mobility     Diabetes mellitus     Hard of hearing     High cholesterol     Hypertension     PAD (peripheral artery disease)     Renal disorder     Due to administration of iv Tobramycin. pt went into renal failure.    Requires continuous at home supplemental oxygen     due to CHF. Pt does not have COPD.    Thyroid disease     Wound of right foot        PAST SURGICAL HISTORY:     Past Surgical History:   Procedure Laterality Date    AMPUTATION OF RIGHT MIDDLE TOE       APPENDECTOMY      APPLICATION, GRAFT Right 1/13/2023    Procedure: APPLICATION, GRAFT Right Theraskin Graft  [facility];  Surgeon: Landon Armas Jr., DPM;  Location: [facility];  Service: Podiatry;  Laterality: Right;    BELLOW THE KNEE AMPUTATION Left     CATARACT EXTRACTION Bilateral     DEBRIDEMENT OF FOOT Right 1/13/2023    Procedure: DEBRIDEMENT, FOOT Right;  Surgeon: Landon Armas Jr., DPM;  Location: [facility];  Service: Podiatry;  Laterality: Right;    LEFT FOOT SURGERY       MULTIPLE TIMES PRIOR TO AMPUTATION       ALLERGIES:   Tobramycin    FAMILY HISTORY:   Both parents has diabetes    SOCIAL HISTORY:   Chews tobacco, never smoked  Last alcohol greater than 30 years ago  No illicit drug use    REVIEW OF SYSTEMS:   Except as documented, all other systems reviewed and negative     PHYSICAL EXAM:     VITAL SIGNS: 24 HRS MIN & MAX LAST   Temp  Min: 98 °F (36.7 °C)  Max: 98 °F (36.7 °C) 98 °F (36.7 °C)   BP  Min: 67/32  Max: 138/68 103/80   Pulse  Min: 62  Max: 80  80   Resp  Min: 12  Max: 23 15   SpO2  Min: 96 %  Max: 100 % 100 %     General  appearance: Well-developed, well-nourished male in no apparent distress.  HENT: Atraumatic head. Moist mucous membranes of oral cavity.  Eyes: Normal extraocular movements.   Neck: Supple.  Large neck circumference  Lungs: Clear to auscultation bilaterally. No wheezing present.  On room air  Heart: Regular rate and rhythm. S1 and S2 present with no murmurs/gallop/rub. No JVD present.   Abdomen: Soft, non-distended, non-tender. No rebound tenderness/guarding. Bowel sounds are normal.   Extremities:  Right AKA, left BKA  Skin: No Rash.   Neuro: Motor and sensory exams grossly intact. Good tone. Muscle strength 5/5 in all 4 extremities  Psych/mental status: Appropriate mood and affect. Responds appropriately to questions.     LABS AND IMAGING:     Recent Labs   Lab 11/21/24  1051 11/21/24  1230   WBC 15.08*  --    RBC 3.14*  --    HGB 8.1* 8.0*   HCT 26.2* 25.4*   MCV 83.4  --    MCH 25.8*  --    MCHC 30.9*  --    RDW 17.5*  --      --    MPV 10.7*  --        Recent Labs   Lab 11/21/24  1051   *   K 4.8      CO2 26   BUN 46.2*   CREATININE 1.88*   CALCIUM 8.4*   ALBUMIN 2.5*   ALKPHOS 169*   ALT 18   AST 15   BILITOT 0.5       Microbiology Results (last 7 days)       ** No results found for the last 168 hours. **             Radiology- see below    ASSESSMENT & PLAN:   Gross Hematuria secondary to Traumatic Sharp exchange yesterday  On long-term oral anticoagulation  Diabetes mellitus type 2 with hyperglycemia  Hypertension, hyperlipidemia  CKD 3B  Hypothyroidism      Admitted as inpatient on 11/21  3 way Sharp placed in the ED and CVA initiated  Urology on board, appreciate recommendation, agree with the above.  Recommended hand irrigation for clot extraction if needed  Per urology note, if hematuria persist, may need repeat cystoscopy  Hold off on anticoagulation, antiplatelets  Repeat H&H in 9:00 p.m.  Hyperglycemia noted, continue insulin sliding scale and restarted Lantus but at a lower dose of  40 units b.i.d.  Ordered A1c as an add on  Appropriate home medication for chronic medical condition has been resumed  Consult PT OT as patient coming from SNF  Morning CBC, CMP, Mag ordered    VTE Prophylaxis:  SCDs    Patient condition:  Stable    Discharge Planning and Disposition/Anticipated discharge: TBD    Nonsmoker but chews tobacco    Advanced Directives:  I spent 16 mins of face to face discussion regarding advanced directives and end of life planning which included the patient who informed me he does not have a living will but he would like to be a full code    If patient was admitted under observational status it is with my approval/permission.           All diagnosis and differential diagnosis have been reviewed; at least 55 min was spent on this history and physical exam, assessment and plan has been documented; I have personally reviewed the labs and test results that are presently available; I have reviewed the patients medication list; I have reviewed the consulting providers response and recommendations. I have reviewed or attempted to review medical records based upon their availability.    All of the patient and family questions have been addressed and answered. Patient's is agreeable to the above stated plan. I will continue to monitor closely and make adjustments to medical management as needed.    Portions of this note dictated using EMR integrated voice recognition software, and may be subject to voice recognition errors not corrected at proofreading. Please contact writer for clarification if needed  _____________________________________________________________________    HOME MEDICATIONS:     Prior to Admission medications    Medication Sig Start Date End Date Taking? Authorizing Provider   aspirin 81 MG Chew Take 81 mg by mouth once daily.   Yes Provider, Historical   atorvastatin (LIPITOR) 40 MG tablet Take 40 mg by mouth once daily.   Yes Provider, Historical   docusate sodium (COLACE)  100 MG capsule Take 100 mg by mouth 2 (two) times daily.   Yes Provider, Historical   famotidine (PEPCID) 20 MG tablet Take 20 mg by mouth once daily.   Yes Provider, Historical   furosemide (LASIX) 40 MG tablet Take 40 mg by mouth once daily.   Yes Provider, Historical   pregabalin (LYRICA) 75 MG capsule Take 75 mg by mouth 3 (three) times daily.   Yes Provider, Historical   sacubitriL-valsartan (ENTRESTO) 24-26 mg per tablet Take 1 tablet by mouth 2 (two) times daily.   Yes Provider, Historical   senna-docusate 8.6-50 mg (SENNA WITH DOCUSATE SODIUM) 8.6-50 mg per tablet Take 1 tablet by mouth once daily.   Yes Provider, Historical   amiodarone (PACERONE) 100 MG Tab Take by mouth once daily.   Yes Provider, Historical   amLODIPine (NORVASC) 5 MG tablet Take 5 mg by mouth once daily.    Provider, Historical   apixaban (ELIQUIS) 5 mg Tab Take 5 mg by mouth 2 (two) times daily.   Yes Provider, Historical   cephALEXin (KEFLEX) 500 MG capsule Take 1 capsule (500 mg total) by mouth 4 (four) times daily. 1/13/23   Landon Armas Jr., DPM   ferrous gluconate (FERGON) 324 MG tablet Take 324 mg by mouth daily with breakfast.    Provider, Historical   fluticasone propionate (FLONASE) 50 mcg/actuation nasal spray 2 sprays by Each Nostril route once daily.    Provider, Historical   furosemide (LASIX) 80 MG tablet Take 80 mg by mouth 2 (two) times daily.    Provider, Historical   HYDROcodone-acetaminophen (NORCO) 5-325 mg per tablet Take 1 tablet by mouth every 6 (six) hours as needed for Pain. 1/13/23   Landon Armas Jr., DPM   insulin glargine,hum.rec.anlog (LANTUS SUBQ) Inject 75 Units into the skin 2 (two) times a day.   Yes Provider, Historical   insulin lispro 100 unit/mL injection Inject 50 Units into the skin 3 (three) times daily before meals.    Provider, Historical   levothyroxine (SYNTHROID) 75 MCG tablet Take 150 mcg by mouth before breakfast.   Yes Provider, Historical   lisinopriL (PRINIVIL,ZESTRIL) 5 MG  tablet Take 5 mg by mouth once daily.    Provider, Historical   metoprolol succinate 25 mg CSpX Take 25 mg by mouth once daily.    Provider, Historical   potassium chloride (MICRO-K) 10 MEQ CpSR Take 20 mEq by mouth once.    Provider, Historical   predniSONE (DELTASONE) 10 MG tablet Take 10 mg by mouth 2 (two) times daily.    Provider, Historical   pregabalin (LYRICA) 50 MG capsule Take 50 mg by mouth 3 (three) times daily.    Provider, Historical   rosuvastatin (CRESTOR) 40 MG Tab Take 40 mg by mouth every evening.    Provider, Historical   tamsulosin (FLOMAX) 0.4 mg Cap Take 0.4 mg by mouth once daily.    Provider, Historical       INPATIENT LIST OF MEDICATIONS   Scheduled Meds:   [START ON 11/22/2024] amiodarone  100 mg Oral Daily    [START ON 11/22/2024] atorvastatin  40 mg Oral Daily    docusate sodium  100 mg Oral BID    [START ON 11/22/2024] famotidine  20 mg Oral Daily    [START ON 11/22/2024] ferrous sulfate  1 tablet Oral Daily    [START ON 11/22/2024] fluticasone propionate  2 spray Each Nostril Daily    insulin glargine U-100 (Lantus)  40 Units Subcutaneous BID    [START ON 11/22/2024] levothyroxine  150 mcg Oral Before breakfast    mupirocin   Nasal BID    pregabalin  50 mg Oral TID    [START ON 11/22/2024] tamsulosin  0.4 mg Oral Daily     Continuous Infusions:  PRN Meds:.  Current Facility-Administered Medications:     acetaminophen, 1,000 mg, Oral, Q6H PRN    acetaminophen, 650 mg, Oral, Q4H PRN    dextrose 10%, 12.5 g, Intravenous, PRN    dextrose 10%, 25 g, Intravenous, PRN    glucagon (human recombinant), 1 mg, Intramuscular, PRN    glucose, 16 g, Oral, PRN    glucose, 24 g, Oral, PRN    insulin aspart U-100, 0-5 Units, Subcutaneous, QID (AC + HS) PRN    naloxone, 0.02 mg, Intravenous, PRN    ondansetron, 4 mg, Intravenous, Q4H PRN    prochlorperazine, 5 mg, Intravenous, Q6H PRN    sodium chloride 0.9%, 10 mL, Intravenous, PRN    IMAGING:   X-Ray Chest PA Lateral With Lordotic Vie  Indication:  Preoperative respiratory evaluation     Findings: No prior studies are available for comparison. Heart size is  normal and lungs are clear bilaterally. Pulmonary vasculature is  normal.     IMPRESSION: No evidence of acute disease.        Electronically Signed By: Boone Burdick MD  Date/Time Signed: 08/13/2018 16:44      Shiv Faust MD  Department of Hospital Medicine   [facility]   11/21/2024 4:10 PM

## 2024-11-21 NOTE — ED NOTES
Pt cleaned and new brief applied. Pt's BP remains 70s/40s after measuring on both arms. Manual BP measured to verify, pt's Bp 82/48. Dr. Loving notified, see new orders.

## 2024-11-21 NOTE — ED PROVIDER NOTES
Encounter Date: 11/21/2024    SCRIBE #1 NOTE: I, Loida Núñez, am scribing for, and in the presence of,  Johnnie Loving MD. I have scribed the following portions of the note - Other sections scribed: HPI, ROS< PE.       History     Chief Complaint   Patient presents with    Penile Bleeding     Arrives via [facility] from [facility] with reports of penile bleeding. Recently had catheter for urinary retention that was changed yesterday. Reports that after catheter was changed he began with urinary retention with catheter in so Heath was removed and the bleeding began. On Eliquis.      Patient is a 75 y/o male with a PMHx of BPH, CHF, DM, HLD, HTN, PAD, renal disorder, and thyroid disease presents to the ED via EMS from NH for penile bleeding. Patient is coming from nursing home. Per caregiver patient's heath catheter stopped draining yesterday. The nursing home attempted to change the catheter. After the catheter was removed again, patient started bleeding from his penis. Patient is on Eliquis.    Patient reports being unable to urinate without the catheter in place. He denies having a history of difficulties with catheter changes. He reports he has had hematuria before.     The history is provided by the patient, medical records and a caregiver. No  was used.     Review of patient's allergies indicates:   Allergen Reactions    Tobramycin Shortness Of Breath     Past Medical History:   Diagnosis Date    Anticoagulant long-term use     BPH (benign prostatic hyperplasia)     CHF (congestive heart failure)     Dependent for wheelchair mobility     Diabetes mellitus     Hard of hearing     High cholesterol     Hypertension     PAD (peripheral artery disease)     Renal disorder     Due to administration of iv Tobramycin. pt went into renal failure.    Requires continuous at home supplemental oxygen     due to CHF. Pt does not have COPD.    Thyroid disease     Wound of right foot      Past Surgical History:    Procedure Laterality Date    AMPUTATION OF RIGHT MIDDLE TOE       APPENDECTOMY      APPLICATION, GRAFT Right 1/13/2023    Procedure: APPLICATION, GRAFT Right Theraskin Graft  [facility] -Rep;  Surgeon: Landon Armas Jr., DPM;  Location: [facility];  Service: Podiatry;  Laterality: Right;    BELLOW THE KNEE AMPUTATION Left     CATARACT EXTRACTION Bilateral     DEBRIDEMENT OF FOOT Right 1/13/2023    Procedure: DEBRIDEMENT, FOOT Right;  Surgeon: Landon Armas Jr., DPM;  Location: [facility] OR;  Service: Podiatry;  Laterality: Right;    LEFT FOOT SURGERY       MULTIPLE TIMES PRIOR TO AMPUTATION     Family History   Problem Relation Name Age of Onset    Lung cancer Mother      Diabetes Mother      Heart attack Father       Social History     Tobacco Use    Smoking status: Never    Smokeless tobacco: Current     Types: Chew   Substance Use Topics    Alcohol use: Not Currently    Drug use: Not Currently     Review of Systems   Constitutional:  Negative for fatigue, fever and unexpected weight change.   HENT:  Negative for congestion and rhinorrhea.    Eyes:  Negative for pain.   Respiratory:  Negative for chest tightness, shortness of breath and wheezing.    Cardiovascular:  Negative for chest pain.   Gastrointestinal:  Negative for abdominal pain, constipation, diarrhea, nausea and vomiting.   Genitourinary:  Positive for difficulty urinating and penile pain. Negative for dysuria.        Positive for penile bleeding   Musculoskeletal:  Negative for back pain and neck pain.   Skin:  Negative for rash.   Allergic/Immunologic: Negative for environmental allergies, food allergies and immunocompromised state.   Neurological:  Negative for dizziness and speech difficulty.   Hematological:  Does not bruise/bleed easily.   Psychiatric/Behavioral:  Negative for sleep disturbance and suicidal ideas.        Physical Exam     Initial Vitals [11/21/24 1023]   BP Pulse Resp Temp SpO2   (!) 90/47 74 20 98 °F (36.7 °C) 98 %      MAP        --         Physical Exam    Nursing note and vitals reviewed.  Constitutional: He appears well-developed. He is Obese .   HENT:   Head: Normocephalic and atraumatic. Mouth/Throat: Oropharynx is clear and moist.   Eyes: Pupils are equal, round, and reactive to light.   Neck: Neck supple.   Normal range of motion.  Cardiovascular:  Normal rate, regular rhythm, normal heart sounds and intact distal pulses.           Pulmonary/Chest: Breath sounds normal. No respiratory distress.   Abdominal: Abdomen is soft. Bowel sounds are normal. There is no abdominal tenderness.   Dressing to abdomen. There is no rebound and no guarding.   Genitourinary:    Genitourinary Comments: Patient arrived with a diaper and bloodied clot to penis.     Musculoskeletal:         General: No tenderness or edema. Normal range of motion.      Cervical back: Normal range of motion and neck supple.      Comments: Left sided BKA.  Right sided AKA.     Neurological: He is alert and oriented to person, place, and time. He has normal strength. No sensory deficit. GCS score is 15. GCS eye subscore is 4. GCS verbal subscore is 5. GCS motor subscore is 6.   Skin: Skin is warm and dry. Capillary refill takes less than 2 seconds.   Psychiatric: He has a normal mood and affect.         ED Course   Procedures  Labs Reviewed   COMPREHENSIVE METABOLIC PANEL - Abnormal       Result Value    Sodium 133 (*)     Potassium 4.8      Chloride 100      CO2 26      Glucose 311 (*)     Blood Urea Nitrogen 46.2 (*)     Creatinine 1.88 (*)     Calcium 8.4 (*)     Protein Total 7.2      Albumin 2.5 (*)     Globulin 4.7 (*)     Albumin/Globulin Ratio 0.5 (*)     Bilirubin Total 0.5       (*)     ALT 18      AST 15      eGFR 37      Anion Gap 7.0      BUN/Creatinine Ratio 25     PROTIME-INR - Abnormal    PT 17.6 (*)     INR 1.4 (*)    CBC WITH DIFFERENTIAL - Abnormal    WBC 15.08 (*)     RBC 3.14 (*)     Hgb 8.1 (*)     Hct 26.2 (*)     MCV 83.4      MCH 25.8 (*)      MCHC 30.9 (*)     RDW 17.5 (*)     Platelet 372      MPV 10.7 (*)     Neut % 58.1      Lymph % 27.7      Mono % 8.9      Eos % 4.4      Basophil % 0.4      Lymph # 4.17      Neut # 8.77      Mono # 1.34 (*)     Eos # 0.67      Baso # 0.06      IG# 0.07 (*)     IG% 0.5      NRBC% 0.0     HEMOGLOBIN AND HEMATOCRIT, BLOOD - Abnormal    Hgb 8.0 (*)     Hct 25.4 (*)    APTT - Normal    PTT 31.6     CBC W/ AUTO DIFFERENTIAL    Narrative:     The following orders were created for panel order CBC auto differential.  Procedure                               Abnormality         Status                     ---------                               -----------         ------                     CBC with Differential[[id number]]       Abnormal            Final result                 Please view results for these tests on the individual orders.   TYPE & SCREEN    Group & Rh O POS      Indirect Chava GEL NEG      Specimen Outdate 11/24/2024 23:59            Imaging Results    None          Medications   sodium chloride 0.9% bolus 1,000 mL 1,000 mL (0 mLs Intravenous Stopped 11/21/24 1218)     Medical Decision Making  The differential diagnosis includes, but is not limited to, urethral trauma, bladder trauma, anticoagulation, and anemia.    Amount and/or Complexity of Data Reviewed  Labs: ordered. Decision-making details documented in ED Course.  Discussion of management or test interpretation with external provider(s): Discussed with hospitalist who accepts for admission    Risk  Decision regarding hospitalization.            Scribe Attestation:   Scribe #1: I performed the above scribed service and the documentation accurately describes the services I performed. I attest to the accuracy of the note.    Attending Attestation:           Physician Attestation for Scribe:  Physician Attestation Statement for Scribe #1: I, Johnnie Loving MD, reviewed documentation, as scribed by Loida Núñez in my presence, and it is both accurate and  complete.             ED Course as of 11/21/24 1430   Thu Nov 21, 2024   1424 Hemoglobin 8, consistent with the patient's baseline   White blood cell count [id number]  BUN 46, creatinine 1.8 which is better than last reported [MP]   1425 Able to get through a catheter inserted by nursing staff.  Some urine return followed by blood.  Will place patient on CBI. [MP]   1425 Discussed with hospitalist who accepts for admission, will consult Urology [MP]      ED Course User Index  [MP] Johnnie Loving MD                           Clinical Impression:  Final diagnoses:  [N48.89] Penile bleeding (Primary)  [R31.9] Hematuria, unspecified type  [D64.9] Anemia, unspecified type          ED Disposition Condition    Admit Stable                Johnnie Loving MD  11/21/24 1430

## 2024-11-21 NOTE — CONSULTS
Landon Aragon 1950  [id number]  11/21/2024    CONSULTING PHYSICIAN: Dr. Loving    REASON FOR CONSULTATION: penile bleeding    HPI: The patient is a 74 year old male who presented to the ED from [facility] with penile bleeding. He is on Eliquis. PMH includes BPH, CHF, DM, HLD, HTN, PAD.  Patient is s/p cystoscopy with clot evacuation of about 500cc, bladder/urethral fulguration with Dr. Farrell on 10/8/2024.  Catheter was exchanged yesterday. He reports began with retention after heath was exchanged so heath was removed and he began with bleeding afterwards. He has has some hypotension since arrival. Three way heath was placed by ER staff and he has been started on CBI. Labwork reveals WBC 15.08, H&H 8/25.4, BUN/Cr 46.2/1.88.     Past Medical History:   Diagnosis Date    Anticoagulant long-term use     BPH (benign prostatic hyperplasia)     CHF (congestive heart failure)     Dependent for wheelchair mobility     Diabetes mellitus     Hard of hearing     High cholesterol     Hypertension     PAD (peripheral artery disease)     Renal disorder     Due to administration of iv Tobramycin. pt went into renal failure.    Requires continuous at home supplemental oxygen     due to CHF. Pt does not have COPD.    Thyroid disease     Wound of right foot      Past Surgical History:   Procedure Laterality Date    AMPUTATION OF RIGHT MIDDLE TOE       APPENDECTOMY      APPLICATION, GRAFT Right 1/13/2023    Procedure: APPLICATION, GRAFT Right Theraskin Graft  [facility] -[facility];  Surgeon: Landon Armas Jr., DPM;  Location: [facility] OR;  Service: Podiatry;  Laterality: Right;    BELLOW THE KNEE AMPUTATION Left     CATARACT EXTRACTION Bilateral     DEBRIDEMENT OF FOOT Right 1/13/2023    Procedure: DEBRIDEMENT, FOOT Right;  Surgeon: Landon Armas Jr., DPM;  Location: [facility] OR;  Service: Podiatry;  Laterality: Right;    LEFT FOOT SURGERY       MULTIPLE TIMES PRIOR TO AMPUTATION     Family History   Problem Relation Name Age of Onset    Lung  cancer Mother      Diabetes Mother      Heart attack Father       Social History     Tobacco Use    Smoking status: Never    Smokeless tobacco: Current     Types: Chew   Substance Use Topics    Alcohol use: Not Currently    Drug use: Not Currently     Current Facility-Administered Medications   Medication Dose Route Frequency Provider Last Rate Last Admin    acetaminophen tablet 1,000 mg  1,000 mg Oral Q6H PRN Zachary Ochoa, CHRIS        acetaminophen tablet 650 mg  650 mg Oral Q4H PRN Zachary Ochoa, CHRIS        dextrose 10% bolus 125 mL 125 mL  12.5 g Intravenous PRN Zachary Ochoa, OMIDP        dextrose 10% bolus 250 mL 250 mL  25 g Intravenous PRN Zachary Ochoa, OMIDP        glucagon (human recombinant) injection 1 mg  1 mg Intramuscular PRN Zachary Ochoa, CHRIS        glucose chewable tablet 16 g  16 g Oral PRN Zachary Ochoa, CHRIS        glucose chewable tablet 24 g  24 g Oral PRN Zachary Ochoa, OMIDP        insulin aspart U-100 injection 0-5 Units  0-5 Units Subcutaneous QID (AC + HS) PRN Zachary Ochoa, CHRIS        mupirocin 2 % ointment   Nasal BID Shiv Faust MD        naloxone 0.4 mg/mL injection 0.02 mg  0.02 mg Intravenous PRN Zachary Ochoa, CHRIS        ondansetron injection 4 mg  4 mg Intravenous Q4H PRN Zachary Ochoa, CHRIS        prochlorperazine injection Soln 5 mg  5 mg Intravenous Q6H PRN Zachary Ochoa, OMIDP        sodium chloride 0.9% flush 10 mL  10 mL Intravenous PRN Zachary Ochoa, OMIDP         Current Outpatient Medications   Medication Sig Dispense Refill    amiodarone (PACERONE) 100 MG Tab Take by mouth once daily.      amLODIPine (NORVASC) 5 MG tablet Take 5 mg by mouth once daily.      apixaban (ELIQUIS) 5 mg Tab Take 5 mg by mouth 2 (two) times daily.      cephALEXin (KEFLEX) 500 MG capsule Take 1 capsule (500 mg total) by mouth 4 (four) times daily. 20 capsule 0    ferrous gluconate (FERGON) 324 MG  tablet Take 324 mg by mouth daily with breakfast.      fluticasone propionate (FLONASE) 50 mcg/actuation nasal spray 2 sprays by Each Nostril route once daily.      furosemide (LASIX) 80 MG tablet Take 80 mg by mouth 2 (two) times daily.      HYDROcodone-acetaminophen (NORCO) 5-325 mg per tablet Take 1 tablet by mouth every 6 (six) hours as needed for Pain. 20 tablet 0    insulin glargine,hum.rec.anlog (LANTUS SUBQ) Inject 75 Units into the skin 2 (two) times a day.      insulin lispro 100 unit/mL injection Inject 50 Units into the skin 3 (three) times daily before meals.      levothyroxine (SYNTHROID) 75 MCG tablet Take 75 mcg by mouth before breakfast.      lisinopriL (PRINIVIL,ZESTRIL) 5 MG tablet Take 5 mg by mouth once daily.      metoprolol succinate 25 mg CSpX Take 25 mg by mouth once daily.      potassium chloride (MICRO-K) 10 MEQ CpSR Take 20 mEq by mouth once.      predniSONE (DELTASONE) 10 MG tablet Take 10 mg by mouth 2 (two) times daily.      pregabalin (LYRICA) 50 MG capsule Take 50 mg by mouth 3 (three) times daily.      rosuvastatin (CRESTOR) 40 MG Tab Take 40 mg by mouth every evening.      tamsulosin (FLOMAX) 0.4 mg Cap Take 0.4 mg by mouth once daily.       Review of patient's allergies indicates:   Allergen Reactions    Tobramycin Shortness Of Breath       ROS: 12 point review of systems negative other than the HPI    PHYSICAL EXAM:  Vitals:    11/21/24 1133 11/21/24 1141 11/21/24 1231 11/21/24 1332   BP: (!) 84/40 (!) 93/51 134/61 137/70   Pulse: 62 66 70 74   Resp: 17 13 17 12   Temp:       TempSrc:       SpO2: 100% 97% 100% 96%   Weight:         No intake or output data in the 24 hours ending 11/21/24 1523    GEN: WN/WD NAD  HEENT: normocephalic, atraumatic, PERRLA, EOMI, OP clear, nares patent  CV: RRR  RESP: Even and unlabored  ABD: round, soft, NTND  : pink urine draining to  bag with CBI in progress, clears quickly with increasing CBI. Sharp was hand irrigated by Dr. Chavis with no  clot obtained. Sharp placed to traction  EXT: right AKA,. Left BKA  NEURO:  AAOx4    LABS:  Recent Results (from the past 24 hours)   APTT    Collection Time: 11/21/24 10:51 AM   Result Value Ref Range    PTT 31.6 23.2 - 33.7 seconds   Comprehensive metabolic panel    Collection Time: 11/21/24 10:51 AM   Result Value Ref Range    Sodium 133 (L) 136 - 145 mmol/L    Potassium 4.8 3.5 - 5.1 mmol/L    Chloride 100 98 - 107 mmol/L    CO2 26 23 - 31 mmol/L    Glucose 311 (H) 82 - 115 mg/dL    Blood Urea Nitrogen 46.2 (H) 8.4 - 25.7 mg/dL    Creatinine 1.88 (H) 0.72 - 1.25 mg/dL    Calcium 8.4 (L) 8.8 - 10.0 mg/dL    Protein Total 7.2 5.8 - 7.6 gm/dL    Albumin 2.5 (L) 3.4 - 4.8 g/dL    Globulin 4.7 (H) 2.4 - 3.5 gm/dL    Albumin/Globulin Ratio 0.5 (L) 1.1 - 2.0 ratio    Bilirubin Total 0.5 <=1.5 mg/dL     (H) 40 - 150 unit/L    ALT 18 0 - 55 unit/L    AST 15 5 - 34 unit/L    eGFR 37 mL/min/1.73/m2    Anion Gap 7.0 mEq/L    BUN/Creatinine Ratio 25    Type & Screen    Collection Time: 11/21/24 10:51 AM   Result Value Ref Range    Group & Rh O POS     Indirect Chava GEL NEG     Specimen Outdate 11/24/2024 23:59    Protime-INR    Collection Time: 11/21/24 10:51 AM   Result Value Ref Range    PT 17.6 (H) 12.5 - 14.5 seconds    INR 1.4 (H) <=1.3   CBC with Differential    Collection Time: 11/21/24 10:51 AM   Result Value Ref Range    WBC 15.08 (H) 4.50 - 11.50 x10(3)/mcL    RBC 3.14 (L) 4.70 - 6.10 x10(6)/mcL    Hgb 8.1 (L) 14.0 - 18.0 g/dL    Hct 26.2 (L) 42.0 - 52.0 %    MCV 83.4 80.0 - 94.0 fL    MCH 25.8 (L) 27.0 - 31.0 pg    MCHC 30.9 (L) 33.0 - 36.0 g/dL    RDW 17.5 (H) 11.5 - 17.0 %    Platelet 372 130 - 400 x10(3)/mcL    MPV 10.7 (H) 7.4 - 10.4 fL    Neut % 58.1 %    Lymph % 27.7 %    Mono % 8.9 %    Eos % 4.4 %    Basophil % 0.4 %    Lymph # 4.17 0.6 - 4.6 x10(3)/mcL    Neut # 8.77 2.1 - 9.2 x10(3)/mcL    Mono # 1.34 (H) 0.1 - 1.3 x10(3)/mcL    Eos # 0.67 0 - 0.9 x10(3)/mcL    Baso # 0.06 <=0.2 x10(3)/mcL     IG# 0.07 (H) 0 - 0.04 x10(3)/mcL    IG% 0.5 %    NRBC% 0.0 %   Hemoglobin and Hematocrit    Collection Time: 11/21/24 12:30 PM   Result Value Ref Range    Hgb 8.0 (L) 14.0 - 18.0 g/dL    Hct 25.4 (L) 42.0 - 52.0 %       IMAGING:  Imaging Results    None         ASSESSMENT:  Gross hematuria   -began after heath exchange yesterday, likely heath trauma with blood thinners  -three way heath placed in ED and he was initiated on CBI; no clot obtained with hand irrigation  -s/p cystoscopy with clot evacuation and fulguration of bladder/urethra with Dr. Farrell 10/8/2024  -h/o BPH      PLAN:  -Heath placed to traction  -Continue CBI, titrate to maintain relatively clear urine; hand irrigate as needed for clots  -Recommend holding blood thinners  -If hematuria does not improve as expected with CBI, will need to consider cystoscopy for further evaluation.  -Discussed with patient and visitor at bedside      Loreta Schreiber NP

## 2024-11-22 ENCOUNTER — ANESTHESIA EVENT (OUTPATIENT)
Dept: SURGERY | Facility: HOSPITAL | Age: 74
End: 2024-11-22
Payer: MEDICARE

## 2024-11-22 LAB
ABO + RH BLD: NORMAL
ALBUMIN SERPL-MCNC: 2.5 G/DL (ref 3.4–4.8)
ALBUMIN/GLOB SERPL: 0.5 RATIO (ref 1.1–2)
ALP SERPL-CCNC: 158 UNIT/L (ref 40–150)
ALT SERPL-CCNC: 17 UNIT/L (ref 0–55)
ANION GAP SERPL CALC-SCNC: 10 MEQ/L
APTT PPP: 31.8 SECONDS (ref 23.2–33.7)
AST SERPL-CCNC: 16 UNIT/L (ref 5–34)
BASOPHILS # BLD AUTO: 0.06 X10(3)/MCL
BASOPHILS NFR BLD AUTO: 0.6 %
BILIRUB SERPL-MCNC: 0.4 MG/DL
BLD PROD TYP BPU: NORMAL
BLOOD UNIT EXPIRATION DATE: NORMAL
BLOOD UNIT TYPE CODE: 5100
BUN SERPL-MCNC: 39.2 MG/DL (ref 8.4–25.7)
CALCIUM SERPL-MCNC: 8.5 MG/DL (ref 8.8–10)
CHLORIDE SERPL-SCNC: 102 MMOL/L (ref 98–107)
CO2 SERPL-SCNC: 23 MMOL/L (ref 23–31)
CREAT SERPL-MCNC: 1.31 MG/DL (ref 0.72–1.25)
CREAT/UREA NIT SERPL: 30
CROSSMATCH INTERPRETATION: NORMAL
DISPENSE STATUS: NORMAL
EOSINOPHIL # BLD AUTO: 0.56 X10(3)/MCL (ref 0–0.9)
EOSINOPHIL NFR BLD AUTO: 5.3 %
ERYTHROCYTE [DISTWIDTH] IN BLOOD BY AUTOMATED COUNT: 17.2 % (ref 11.5–17)
GFR SERPLBLD CREATININE-BSD FMLA CKD-EPI: 58 ML/MIN/1.73/M2
GLOBULIN SER-MCNC: 4.7 GM/DL (ref 2.4–3.5)
GLUCOSE SERPL-MCNC: 232 MG/DL (ref 82–115)
HCT VFR BLD AUTO: 22 % (ref 42–52)
HCT VFR BLD AUTO: 25.4 % (ref 42–52)
HGB BLD-MCNC: 6.7 G/DL (ref 14–18)
HGB BLD-MCNC: 7.9 G/DL (ref 14–18)
IMM GRANULOCYTES # BLD AUTO: 0.07 X10(3)/MCL (ref 0–0.04)
IMM GRANULOCYTES NFR BLD AUTO: 0.7 %
INR PPP: 1.4
LYMPHOCYTES # BLD AUTO: 2.93 X10(3)/MCL (ref 0.6–4.6)
LYMPHOCYTES NFR BLD AUTO: 27.9 %
MAGNESIUM SERPL-MCNC: 2.1 MG/DL (ref 1.6–2.6)
MCH RBC QN AUTO: 25.9 PG (ref 27–31)
MCHC RBC AUTO-ENTMCNC: 31.1 G/DL (ref 33–36)
MCV RBC AUTO: 83.3 FL (ref 80–94)
MONOCYTES # BLD AUTO: 0.84 X10(3)/MCL (ref 0.1–1.3)
MONOCYTES NFR BLD AUTO: 8 %
NEUTROPHILS # BLD AUTO: 6.04 X10(3)/MCL (ref 2.1–9.2)
NEUTROPHILS NFR BLD AUTO: 57.5 %
NRBC BLD AUTO-RTO: 0 %
PLATELET # BLD AUTO: 278 X10(3)/MCL (ref 130–400)
PMV BLD AUTO: 10.3 FL (ref 7.4–10.4)
POCT GLUCOSE: 215 MG/DL (ref 70–110)
POCT GLUCOSE: 329 MG/DL (ref 70–110)
POCT GLUCOSE: 386 MG/DL (ref 70–110)
POTASSIUM SERPL-SCNC: 4.6 MMOL/L (ref 3.5–5.1)
PROT SERPL-MCNC: 7.2 GM/DL (ref 5.8–7.6)
PROTHROMBIN TIME: 17.6 SECONDS (ref 12.5–14.5)
RBC # BLD AUTO: 3.05 X10(6)/MCL (ref 4.7–6.1)
SODIUM SERPL-SCNC: 135 MMOL/L (ref 136–145)
UNIT NUMBER: NORMAL
WBC # BLD AUTO: 10.5 X10(3)/MCL (ref 4.5–11.5)

## 2024-11-22 PROCEDURE — 36415 COLL VENOUS BLD VENIPUNCTURE: CPT | Performed by: INTERNAL MEDICINE

## 2024-11-22 PROCEDURE — 36430 TRANSFUSION BLD/BLD COMPNT: CPT

## 2024-11-22 PROCEDURE — 25000003 PHARM REV CODE 250: Performed by: NURSE PRACTITIONER

## 2024-11-22 PROCEDURE — 27000221 HC OXYGEN, UP TO 24 HOURS

## 2024-11-22 PROCEDURE — 25000242 PHARM REV CODE 250 ALT 637 W/ HCPCS: Performed by: INTERNAL MEDICINE

## 2024-11-22 PROCEDURE — 36415 COLL VENOUS BLD VENIPUNCTURE: CPT | Performed by: NURSE PRACTITIONER

## 2024-11-22 PROCEDURE — 25000003 PHARM REV CODE 250: Performed by: INTERNAL MEDICINE

## 2024-11-22 PROCEDURE — 85025 COMPLETE CBC W/AUTO DIFF WBC: CPT | Performed by: NURSE PRACTITIONER

## 2024-11-22 PROCEDURE — 86923 COMPATIBILITY TEST ELECTRIC: CPT | Performed by: INTERNAL MEDICINE

## 2024-11-22 PROCEDURE — 80053 COMPREHEN METABOLIC PANEL: CPT | Performed by: NURSE PRACTITIONER

## 2024-11-22 PROCEDURE — P9016 RBC LEUKOCYTES REDUCED: HCPCS | Performed by: INTERNAL MEDICINE

## 2024-11-22 PROCEDURE — 85014 HEMATOCRIT: CPT | Performed by: INTERNAL MEDICINE

## 2024-11-22 PROCEDURE — 97162 PT EVAL MOD COMPLEX 30 MIN: CPT

## 2024-11-22 PROCEDURE — 85730 THROMBOPLASTIN TIME PARTIAL: CPT | Performed by: NURSE PRACTITIONER

## 2024-11-22 PROCEDURE — 85610 PROTHROMBIN TIME: CPT | Performed by: NURSE PRACTITIONER

## 2024-11-22 PROCEDURE — 63600175 PHARM REV CODE 636 W HCPCS: Performed by: NURSE PRACTITIONER

## 2024-11-22 PROCEDURE — 30233N0 TRANSFUSION OF AUTOLOGOUS RED BLOOD CELLS INTO PERIPHERAL VEIN, PERCUTANEOUS APPROACH: ICD-10-PCS | Performed by: INTERNAL MEDICINE

## 2024-11-22 PROCEDURE — 11000001 HC ACUTE MED/SURG PRIVATE ROOM

## 2024-11-22 PROCEDURE — 83735 ASSAY OF MAGNESIUM: CPT | Performed by: INTERNAL MEDICINE

## 2024-11-22 PROCEDURE — 63600175 PHARM REV CODE 636 W HCPCS: Performed by: INTERNAL MEDICINE

## 2024-11-22 RX ORDER — SODIUM CHLORIDE, SODIUM GLUCONATE, SODIUM ACETATE, POTASSIUM CHLORIDE AND MAGNESIUM CHLORIDE 30; 37; 368; 526; 502 MG/100ML; MG/100ML; MG/100ML; MG/100ML; MG/100ML
INJECTION, SOLUTION INTRAVENOUS CONTINUOUS
Status: CANCELLED | OUTPATIENT
Start: 2024-11-22 | End: 2024-12-22

## 2024-11-22 RX ORDER — LIDOCAINE HYDROCHLORIDE 10 MG/ML
1 INJECTION, SOLUTION EPIDURAL; INFILTRATION; INTRACAUDAL; PERINEURAL ONCE
Status: CANCELLED | OUTPATIENT
Start: 2024-11-22 | End: 2024-11-22

## 2024-11-22 RX ORDER — MIDAZOLAM HYDROCHLORIDE 2 MG/2ML
2 INJECTION, SOLUTION INTRAMUSCULAR; INTRAVENOUS ONCE AS NEEDED
Status: CANCELLED | OUTPATIENT
Start: 2024-11-22 | End: 2036-04-20

## 2024-11-22 RX ORDER — ONDANSETRON 4 MG/1
4 TABLET, ORALLY DISINTEGRATING ORAL ONCE
Status: CANCELLED | OUTPATIENT
Start: 2024-11-22 | End: 2024-11-22

## 2024-11-22 RX ORDER — HYOSCYAMINE SULFATE 0.12 MG/1
0.12 TABLET SUBLINGUAL EVERY 4 HOURS PRN
Status: DISCONTINUED | OUTPATIENT
Start: 2024-11-22 | End: 2024-11-29 | Stop reason: HOSPADM

## 2024-11-22 RX ORDER — INSULIN GLARGINE 100 [IU]/ML
50 INJECTION, SOLUTION SUBCUTANEOUS 2 TIMES DAILY
Status: DISCONTINUED | OUTPATIENT
Start: 2024-11-22 | End: 2024-11-24

## 2024-11-22 RX ORDER — HYDROCODONE BITARTRATE AND ACETAMINOPHEN 500; 5 MG/1; MG/1
TABLET ORAL
Status: DISCONTINUED | OUTPATIENT
Start: 2024-11-22 | End: 2024-11-25

## 2024-11-22 RX ORDER — INSULIN ASPART 100 [IU]/ML
0-10 INJECTION, SOLUTION INTRAVENOUS; SUBCUTANEOUS
Status: DISCONTINUED | OUTPATIENT
Start: 2024-11-22 | End: 2024-11-29 | Stop reason: HOSPADM

## 2024-11-22 RX ADMIN — INSULIN GLARGINE 50 UNITS: 100 INJECTION, SOLUTION SUBCUTANEOUS at 09:11

## 2024-11-22 RX ADMIN — AMIODARONE HYDROCHLORIDE 100 MG: 100 TABLET ORAL at 09:11

## 2024-11-22 RX ADMIN — DOCUSATE SODIUM 100 MG: 100 CAPSULE, LIQUID FILLED ORAL at 09:11

## 2024-11-22 RX ADMIN — DOCUSATE SODIUM 100 MG: 100 CAPSULE, LIQUID FILLED ORAL at 08:11

## 2024-11-22 RX ADMIN — PREGABALIN 50 MG: 50 CAPSULE ORAL at 09:11

## 2024-11-22 RX ADMIN — INSULIN ASPART 2 UNITS: 100 INJECTION, SOLUTION INTRAVENOUS; SUBCUTANEOUS at 06:11

## 2024-11-22 RX ADMIN — FERROUS SULFATE TAB 325 MG (65 MG ELEMENTAL FE) 1 EACH: 325 (65 FE) TAB at 09:11

## 2024-11-22 RX ADMIN — PREGABALIN 50 MG: 50 CAPSULE ORAL at 08:11

## 2024-11-22 RX ADMIN — INSULIN ASPART 8 UNITS: 100 INJECTION, SOLUTION INTRAVENOUS; SUBCUTANEOUS at 12:11

## 2024-11-22 RX ADMIN — ATORVASTATIN CALCIUM 40 MG: 40 TABLET, FILM COATED ORAL at 09:11

## 2024-11-22 RX ADMIN — FLUTICASONE PROPIONATE 100 MCG: 50 SPRAY, METERED NASAL at 09:11

## 2024-11-22 RX ADMIN — LEVOTHYROXINE SODIUM 150 MCG: 150 TABLET ORAL at 05:11

## 2024-11-22 RX ADMIN — PROCHLORPERAZINE EDISYLATE 5 MG: 5 INJECTION INTRAMUSCULAR; INTRAVENOUS at 08:11

## 2024-11-22 RX ADMIN — TAMSULOSIN HYDROCHLORIDE 0.4 MG: 0.4 CAPSULE ORAL at 09:11

## 2024-11-22 RX ADMIN — MUPIROCIN: 20 OINTMENT TOPICAL at 08:11

## 2024-11-22 RX ADMIN — HYOSCYAMINE SULFATE 0.12 MG: 0.12 TABLET SUBLINGUAL at 09:11

## 2024-11-22 RX ADMIN — FAMOTIDINE 20 MG: 20 TABLET, FILM COATED ORAL at 09:11

## 2024-11-22 RX ADMIN — PREGABALIN 50 MG: 50 CAPSULE ORAL at 04:11

## 2024-11-22 NOTE — PROGRESS NOTES
Called by nursing due to Sharp catheter clotting off.  Sharp catheter irrigates easily by hand however CBI port not functioning well.  Gross hematuria noted with blood draining around catheter.  Sharp catheter was removed in 24 Italian three-way catheter was placed without difficulty, hand irrigated without clot obtained.  CBI infusing at a high rate with very light pink urine draining to  bag.  Sharp placed to traction.    NPO after midnight . Plan for cystoscopy with clot evacuation and fulguration in the morning.

## 2024-11-22 NOTE — PLAN OF CARE
Problem: Infection  Goal: Absence of Infection Signs and Symptoms  Outcome: Progressing     Problem: Adult Inpatient Plan of Care  Goal: Plan of Care Review  Outcome: Progressing  Goal: Patient-Specific Goal (Individualized)  Outcome: Progressing  Goal: Absence of Hospital-Acquired Illness or Injury  Outcome: Progressing  Goal: Optimal Comfort and Wellbeing  Outcome: Progressing  Goal: Readiness for Transition of Care  Outcome: Progressing     Problem: Skin Injury Risk Increased  Goal: Skin Health and Integrity  Outcome: Progressing     Problem: Wound  Goal: Optimal Coping  Outcome: Progressing  Goal: Optimal Functional Ability  Outcome: Progressing  Goal: Absence of Infection Signs and Symptoms  Outcome: Progressing  Goal: Improved Oral Intake  Outcome: Progressing  Goal: Optimal Pain Control and Function  Outcome: Progressing  Goal: Skin Health and Integrity  Outcome: Progressing  Goal: Optimal Wound Healing  Outcome: Progressing

## 2024-11-22 NOTE — PROGRESS NOTES
UROLOGY  PROGRESS  NOTE    Landon Aragon 1950  [id number]  11/22/2024    Patient resting in bed   Nursing at bedside during rounds  Remains on CBI, hematuria did appear to be improving this morning and rate was decreased however recently worsened   Irrigation increased and patient developed pressure, Sharp not draining well    Afebrile, hypotension improved   Urine output inaccurate secondary to CBI  WBC 10.5   H&H 7.9/25.4  BUN and creatinine 39.2/1.31    Intake/Output:  No intake/output data recorded.  I/O last 3 completed shifts:  In: 240 [P.O.:240]  Out: 6825 [Urine:6825]     Exam:    NAD  Card: RRR  Resp: unlabored  Abd:  Soft, NT ND  :  Red urine draining to  bag, there is clot within  tubing; Sharp catheter irrigates easily, no clot obtained, urine light red after irrigating.  CBI resumed at a high rate with light red urine draining.   bag changed  Extremity:  Bilateral lower extremity amputations    Recent Results (from the past 24 hours)   Hemoglobin and Hematocrit    Collection Time: 11/21/24 12:30 PM   Result Value Ref Range    Hgb 8.0 (L) 14.0 - 18.0 g/dL    Hct 25.4 (L) 42.0 - 52.0 %   Hemoglobin A1C    Collection Time: 11/21/24 12:30 PM   Result Value Ref Range    Hemoglobin A1c 7.5 (H) <=7.0 %    Estimated Average Glucose 168.6 mg/dL   POCT glucose    Collection Time: 11/21/24  4:14 PM   Result Value Ref Range    POCT Glucose 364 (H) 70 - 110 mg/dL   POCT glucose    Collection Time: 11/21/24  8:19 PM   Result Value Ref Range    POCT Glucose 386 (H) 70 - 110 mg/dL   Hemoglobin and Hematocrit    Collection Time: 11/21/24  9:05 PM   Result Value Ref Range    Hgb 7.6 (L) 14.0 - 18.0 g/dL    Hct 24.3 (L) 42.0 - 52.0 %   POCT glucose    Collection Time: 11/22/24  5:31 AM   Result Value Ref Range    POCT Glucose 215 (H) 70 - 110 mg/dL   Comprehensive Metabolic Panel (CMP)    Collection Time: 11/22/24  5:56 AM   Result Value Ref Range    Sodium 135 (L) 136 - 145 mmol/L    Potassium 4.6 3.5 - 5.1  mmol/L    Chloride 102 98 - 107 mmol/L    CO2 23 23 - 31 mmol/L    Glucose 232 (H) 82 - 115 mg/dL    Blood Urea Nitrogen 39.2 (H) 8.4 - 25.7 mg/dL    Creatinine 1.31 (H) 0.72 - 1.25 mg/dL    Calcium 8.5 (L) 8.8 - 10.0 mg/dL    Protein Total 7.2 5.8 - 7.6 gm/dL    Albumin 2.5 (L) 3.4 - 4.8 g/dL    Globulin 4.7 (H) 2.4 - 3.5 gm/dL    Albumin/Globulin Ratio 0.5 (L) 1.1 - 2.0 ratio    Bilirubin Total 0.4 <=1.5 mg/dL     (H) 40 - 150 unit/L    ALT 17 0 - 55 unit/L    AST 16 5 - 34 unit/L    eGFR 58 mL/min/1.73/m2    Anion Gap 10.0 mEq/L    BUN/Creatinine Ratio 30    Protime-INR    Collection Time: 11/22/24  5:56 AM   Result Value Ref Range    PT 17.6 (H) 12.5 - 14.5 seconds    INR 1.4 (H) <=1.3   APTT    Collection Time: 11/22/24  5:56 AM   Result Value Ref Range    PTT 31.8 23.2 - 33.7 seconds   Magnesium    Collection Time: 11/22/24  5:56 AM   Result Value Ref Range    Magnesium Level 2.10 1.60 - 2.60 mg/dL   CBC with Differential    Collection Time: 11/22/24  5:56 AM   Result Value Ref Range    WBC 10.50 4.50 - 11.50 x10(3)/mcL    RBC 3.05 (L) 4.70 - 6.10 x10(6)/mcL    Hgb 7.9 (L) 14.0 - 18.0 g/dL    Hct 25.4 (L) 42.0 - 52.0 %    MCV 83.3 80.0 - 94.0 fL    MCH 25.9 (L) 27.0 - 31.0 pg    MCHC 31.1 (L) 33.0 - 36.0 g/dL    RDW 17.2 (H) 11.5 - 17.0 %    Platelet 278 130 - 400 x10(3)/mcL    MPV 10.3 7.4 - 10.4 fL    Neut % 57.5 %    Lymph % 27.9 %    Mono % 8.0 %    Eos % 5.3 %    Basophil % 0.6 %    Lymph # 2.93 0.6 - 4.6 x10(3)/mcL    Neut # 6.04 2.1 - 9.2 x10(3)/mcL    Mono # 0.84 0.1 - 1.3 x10(3)/mcL    Eos # 0.56 0 - 0.9 x10(3)/mcL    Baso # 0.06 <=0.2 x10(3)/mcL    IG# 0.07 (H) 0 - 0.04 x10(3)/mcL    IG% 0.7 %    NRBC% 0.0 %   POCT glucose    Collection Time: 11/22/24 10:58 AM   Result Value Ref Range    POCT Glucose 329 (H) 70 - 110 mg/dL       Assessment:  Gross hematuria   -began after heath exchange yesterday, likely heath trauma with blood thinners  -three way heath placed in ED and he was initiated on  CBI; no clot obtained with hand irrigation  -s/p cystoscopy with clot evacuation and fulguration of bladder/urethra with Dr. Farrell 10/8/2024  -h/o BPH    Plan:  -continue CBI, titrate as needed to maintain relatively clear urine   -hand irrigate as needed for clots  -recommend continue holding blood thinners  -may need cystoscopy with possible fulguration tomorrow if hematuria does not improve.  NPO after midnight.  Will check in on patient in the morning.   -Discussed plan with patient and nursing, as well as attending      Loreta Schreiber NP

## 2024-11-22 NOTE — PT/OT/SLP EVAL
Physical Therapy Evaluation    Patient Name:  Landon Aragon   MRN:  [id number]    Recommendations:     Discharge therapy intensity: Moderate Intensity Therapy   Discharge Equipment Recommendations: none   Barriers to discharge: Impaired mobility and Ongoing medical needs    Assessment:     Landon Aragon is a 74 y.o. male admitted with a medical diagnosis of penile bleeding. Patient with history of right AKA and left BKA. Patient admitted from a NH where he has been for ~1 month. Prior to 1 month ago, patient was living alone in a [facility]. He was able to transfer to/from powerchair independently.   He presents with the following impairments/functional limitations: weakness, impaired endurance, impaired self care skills, impaired functional mobility, gait instability, impaired balance, pain, decreased lower extremity function, decreased safety awareness. He required MIN A - MOD A for rolling. MOD A of 2 for supine<>sit. Trunk control considered good once stabilized upright. He was able to scoot with MIN A. Patient will benefit from continued PT services while in hospital to improve functional mobility & return to PLOF. Recommending MOD intensity therapy at discharge. Progress as tolerated.     Rehab Prognosis: Good; patient would benefit from acute skilled PT services to address these deficits and reach maximum level of function.    Recent Surgery: * No surgery found *      Plan:     During this hospitalization, patient would benefit from acute PT services 5 x/week to address the identified rehab impairments via therapeutic activities, therapeutic exercises, neuromuscular re-education, wheelchair management/training and progress toward the following goals:    Plan of Care Expires:  12/22/24    Subjective     Chief Complaint: pain  Patient/Family Comments/goals: none  Pain/Comfort:  Pain Rating 1: 4/10  Location 1:  (buttock)  Pain Addressed 1: Distraction, Reposition  Pain Rating Post-Intervention 1: 4/10    Patients  cultural, spiritual, Restorationist conflicts given the current situation: no    Living Environment:  Prior to 1 month ago, patient was living alone in a [facility]. He was able to transfer to/from powerchair independently. Equipment used at home: power chair, shower chair, slide board.  DME owned (not currently used): none.  Upon discharge, patient will have assistance from TBD.    Objective:     Communicated with nurse prior to session.  Patient found supine with telemetry, heath catheter, peripheral IV  upon PT entry to room.    General Precautions: Standard, fall  Orthopedic Precautions:N/A   Braces: N/A  Respiratory Status: Room air      Exams:  Cognitive Exam:  Patient is oriented to Person, Place, Time, and Situation  Sensation: -       Intact  RLE ROM: WFL  RLE Strength: -4/5 grossly  LLE ROM: WFL  LLE Strength: -4/5 grossly  Skin integrity: Visible skin intact      Functional Mobility:  Bed Mobility:  Rolling Left:  minimum assistance  Rolling Right: moderate assistance  Scooting: minimum assistance  Supine to Sit: moderate assistance and of 2 persons  Sit to Supine: moderate assistance  Sitting Balance: good once stabilized upright      AM-PAC 6 CLICK MOBILITY  Total Score:9     Education Provided:  Role and goals of PT, transfer training, bed mobility, balance training, safety awareness, assistive device, strengthening exercises, and importance of participating in PT to return to PLOF.    Patient left right sidelying with all lines intact, call button in reach, and nurse present.    GOALS:   Multidisciplinary Problems       Physical Therapy Goals          Problem: Physical Therapy    Goal Priority Disciplines Outcome Interventions   Physical Therapy Goal     PT, PT/OT Progressing    Description: Goals to be met by: 24     Patient will increase functional independence with mobility by performin. Supine to sit with Set-up [place]  2. Sit to supine with Set-up [place]  3. Rolling to Left and Right  with [place].  4. Bed to chair transfer with Stand-by Assistance using LRAD  5. Wheelchair propulsion x200 feet with [place] using bilateral uppper extremities                         History:     Past Medical History:   Diagnosis Date    Anticoagulant long-term use     BPH (benign prostatic hyperplasia)     CHF (congestive heart failure)     Dependent for wheelchair mobility     Diabetes mellitus     Hard of hearing     High cholesterol     Hypertension     PAD (peripheral artery disease)     Renal disorder     Due to administration of iv Tobramycin. pt went into renal failure.    Requires continuous at home supplemental oxygen     due to CHF. Pt does not have COPD.    Thyroid disease     Wound of right foot        Past Surgical History:   Procedure Laterality Date    AMPUTATION OF RIGHT MIDDLE TOE       APPENDECTOMY      APPLICATION, GRAFT Right 1/13/2023    Procedure: APPLICATION, GRAFT Right [place] Graft  Bennett Jean -;  Surgeon: Landon Armas Jr., DPM;  Location: [facility];  Service: Podiatry;  Laterality: Right;    BELLOW THE KNEE AMPUTATION Left     CATARACT EXTRACTION Bilateral     DEBRIDEMENT OF FOOT Right 1/13/2023    Procedure: DEBRIDEMENT, FOOT Right;  Surgeon: Landon Armas Jr., DPM;  Location: [facility];  Service: Podiatry;  Laterality: Right;    LEFT FOOT SURGERY       MULTIPLE TIMES PRIOR TO AMPUTATION       Time Tracking:     PT Received On: 11/22/24  PT Start Time: 0947     PT Stop Time: 1003  PT Total Time (min): 16 min     Billable Minutes: Evaluation 16 minutes      11/22/2024

## 2024-11-22 NOTE — PROGRESS NOTES
[facility] - 8th Floor Med Surg  Wound Care    Patient Name:  Landon Aragon   MRN:  [id number]  Date: 11/22/2024  Diagnosis: <principal problem not specified>    History:     Past Medical History:   Diagnosis Date    Anticoagulant long-term use     BPH (benign prostatic hyperplasia)     CHF (congestive heart failure)     Dependent for wheelchair mobility     Diabetes mellitus     Hard of hearing     High cholesterol     Hypertension     PAD (peripheral artery disease)     Renal disorder     Due to administration of iv Tobramycin. pt went into renal failure.    Requires continuous at home supplemental oxygen     due to CHF. Pt does not have COPD.    Thyroid disease     Wound of right foot        Social History     Socioeconomic History    Marital status: Single   Tobacco Use    Smoking status: Never    Smokeless tobacco: Current     Types: Chew   Substance and Sexual Activity    Alcohol use: Not Currently    Drug use: Not Currently    Sexual activity: Not Currently     Social Drivers of Health     Financial Resource Strain: Low Risk  (4/3/2023)    Received from [facility] Subsidiaries and Affiliates    Overall Financial Resource Strain (CARDIA)     Difficulty of Paying Living Expenses: Not hard at all   Food Insecurity: No Food Insecurity (9/3/2024)    Received from [facility] and Its Subsidiaries and Affiliates    Hunger Vital Sign     Worried About Running Out of Food in the Last Year: Never true     Ran Out of Food in the Last Year: Never true   Transportation Needs: No Transportation Needs (9/3/2024)    Received from [facility] Subsidiaries and Affiliates    PRAPARE - Transportation     Lack of Transportation (Medical): No     Lack of Transportation (Non-Medical): No   Housing Stability: Low Risk  (9/3/2024)    Received from [facility] Its Subsidiaries and Affiliates    Housing Stability Vital Sign     Unable to Pay for Housing in the Last Year: No     Number of Times Moved in the Last Year: 0     Homeless in the Last Year: No       Precautions:     Allergies as of 11/21/2024 - Reviewed 11/21/2024   Allergen Reaction Noted    Tobramycin Shortness Of Breath 01/08/2023       [facility] Assessment Details/Treatment      11/22/24 0905   WO Assessment   Visit Date 11/22/24   Visit Time 0905   Consult Type New   [facility] Speciality Wound   Intervention chart review;assessed;changed;applied;orders   Teaching on-going        Wound 11/21/24 1030 Pressure Injury Left medial Buttocks   Date First Assessed/Time First Assessed: 11/21/24 1030   Present on Original Admission: Yes  Primary Wound Type: Pressure Injury  Side: Left  Orientation: medial  Location: Buttocks   Wound Image     Dressing Appearance Moist drainage   Drainage Amount Moderate   Drainage Characteristics/Odor Serosanguineous   Appearance Moist;Yellow;Slough;Pink   Tissue loss description Full thickness   Black (%), Wound Tissue Color 0 %   Red (%), Wound Tissue Color 60 %   Yellow (%), Wound Tissue Color 40 %   Periwound Area Moist;Pale white;Pink;Other (see comments)  (appearance of erosion to hema wound)   Wound Edges Irregular   Wound Length (cm) 5.1 cm   Wound Width (cm) 10.1 cm   Wound Depth (cm) 2.9 cm   Wound Volume (cm^3) 149.379 cm^3   Wound Surface Area (cm^2) 51.51 cm^2   Undermining (depth (cm)/location) 11 o'clock 1.4 cm   Care Cleansed with:;Antimicrobial agent   Dressing Applied;Other (comment)  (moistened mesalt to wound , abd pad and tape. Vashe wet to dry to area periwound without slough.)        Wound 11/21/24 1030 Pressure Injury Right Buttocks   Date First Assessed/Time First Assessed: 11/21/24 1030   Present on Original Admission: Yes  Primary Wound Type: Pressure Injury  Side: Right  Location: Buttocks   Wound Image    Dressing Appearance Moist drainage   Drainage  Amount Moderate   Drainage Characteristics/Odor Serosanguineous   Appearance Pink;Red;Moist   Tissue loss description Partial thickness   Black (%), Wound Tissue Color 0 %   Red (%), Wound Tissue Color 100 %   Yellow (%), Wound Tissue Color 0 %   Periwound Area Pink;Macerated   Wound Edges Irregular   Care Cleansed with:;Antimicrobial agent   Dressing Applied;Other (comment)  (vashe moistenend gauze, wet to dry , abd pad, tape)     WOCN new consult for buttocks. Introduced myself and Gela nurse tech to patient. Explained reason for visit. Pt was admitted from long term nursing home with sacral wound. Pt consented for wound care assessment and treatment. Cleansed well with Vashe, measurements obtained , photos taken for EMR. Treatment recommendations include: cleansing with Vashe , applying moistened mesalt to right buttocks wound, cover with dry gauze, abd pad and tape BID. Left buttocks: cleanse well with Vashe , apply Vashe moistened wet to dry, cover with abd pad, secure with  tape. Apply zinc oxide to periwound BID and prn.  Pt is unable to turn himself in bed. Immerse DANIEL mattress ordered. Discussed importance of protein and nutritional needs for wound healing. Keep patient clean and dry. No adult briefs while in bed. Pt tolerated well. Call bell within reach. Nursing to continue with treatment recommendations. Will follow up.  11/22/2024

## 2024-11-22 NOTE — PLAN OF CARE
11/22/24 1549   Discharge Assessment   Assessment Type Discharge Planning Assessment   Confirmed/corrected address, phone number and insurance Yes   Confirmed Demographics Correct on Facesheet   Source of Information family   Communicated ISABEL with patient/caregiver Date not available/Unable to determine   Reason For Admission CP, Anemia   People in Home significant other   Facility Arrived From: [facility]   Do you expect to return to your current living situation? Yes   Do you have help at home or someone to help you manage your care at home? Yes   Who are your caregiver(s) and their phone number(s)? SO, Angi Aragon [phone]   Prior to hospitilization cognitive status: Unable to Assess   Current cognitive status: Alert/Oriented   Walking or Climbing Stairs Difficulty yes   Walking or Climbing Stairs ambulation difficulty, requires equipment   Mobility Management WC   Dressing/Bathing Difficulty yes   Dressing/Bathing bathing difficulty, requires equipment;bathing difficulty, assistance 1 person   Dressing/Bathing Management Assistance with all ADL's   Home Accessibility wheelchair accessible   Home Layout Able to live on 1st floor   Equipment Currently Used at Home shower chair;slide board;power chair   Patient currently being followed by outpatient case management? No   Do you currently have service(s) that help you manage your care at home? No   Do you take prescription medications? Yes   Do you have prescription coverage? Yes   Coverage MCR   Is the patient taking medications as prescribed? yes   Who is going to help you get home at discharge? SO, Angi Aragon   How do you get to doctors appointments? family or friend will provide   Are you on dialysis? No   Do you take coumadin? No   Discharge Plan A Skilled Nursing Facility   Discharge Plan B Rehab   DME Needed Upon Discharge  none   Discharge Plan discussed with: Spouse/sig other;Patient   Name(s) and Number(s) Angi Aragon   Physical Activity    On average, how many days per week do you engage in moderate to strenuous exercise (like a brisk walk)? 0 days   On average, how many minutes do you engage in exercise at this level? 0 min   Financial Resource Strain   How hard is it for you to pay for the very basics like food, housing, medical care, and heating? Not hard   Housing Stability   In the last 12 months, was there a time when you were not able to pay the mortgage or rent on time? N   At any time in the past 12 months, were you homeless or living in a shelter (including now)? N   Transportation Needs   Has the lack of transportation kept you from medical appointments, meetings, work or from getting things needed for daily living? No   Food Insecurity   Within the past 12 months, you worried that your food would run out before you got the money to buy more. Never true   Within the past 12 months, the food you bought just didn't last and you didn't have money to get more. Never true   Stress   Do you feel stress - tense, restless, nervous, or anxious, or unable to sleep at night because your mind is troubled all the time - these days? Pt Unable   Social Isolation   How often do you feel lonely or isolated from those around you?  Patient unable to answer   Alcohol Use   Q1: How often do you have a drink containing alcohol? Pt Unable   Q2: How many drinks containing alcohol do you have on a typical day when you are drinking? Pt Unable   Q3: How often do you have six or more drinks on one occasion? Pt Unable   Utilities   In the past 12 months has the electric, gas, oil, or water company threatened to shut off services in your home? No   Health Literacy   How often do you need to have someone help you when you read instructions, pamphlets, or other written material from your doctor or pharmacy? Patient unable to respond   OTHER   Name(s) of People in Home Angi HERNANDES     Assessment completed in room.  Angi HERNANDES at bedside.  Patient transferred from  [place] where he was receiving rehab.  Patient is a paraplegic.  Does not drive.  SO, Angi, provides care and transportation  PCP-Mat Taylor  Pharmacy-[facility] [address] in [place]    Discharge plans TBD.  Will continue to follow

## 2024-11-22 NOTE — PROGRESS NOTES
[facility] Hospital Medicine Progress Note        Chief Complaint: Inpatient Follow-up for dramatic hematuria post Heath exchange    HPI per admitting team: 74-year-old male with known past medical history of congestive heart failure, diabetes mellitus type 2, right AKA and left BKA due to diabetic neuropathy, hard of hearing, hyperlipidemia, hypertension, peripheral arterial disease, currently at [facility] admitted 11/21/2024 for hematuria/penile bleeding after Heath was exchanged yesterday at [facility], concern for traumatic injury.  In the ED patient had Heath placed and CBI started.  Urology consulted.  Patient has no complaints currently, denies any pain.  Urine in Heath bag is still red.  Informed patient that urology has been consulted and they will evaluate him soon.  Verbalized understanding  Did informed patient that will be will hold off his aspirin and Eliquis for now and resume other home medication.  Labs reviewed, WBC 15 K, H&H 8.0/25.4, platelets 372, no left shift  PT INR 17.6/1.4  BMP shows sodium of 133, BUN creatinine 46.2/1.8, glucose 311, , AST/ALT within normal range    Interval Hx:   Patient is laying in bed, awake alert and oriented.    Neurology NP Loreta at bedside manually irrigating the bladder, noted bright red in Heath as well and Heath bag   Patient denies any complaints  Case was discussed with patient's nurse and  on the floor.  Case discussed with Loreta as well, plan for cystoscopy tomorrow.  NPO after    Objective/physical exam:  General: In no acute distress, afebrile  Chest: Clear to auscultation bilaterally  Heart: RRR, +S1, S2, no appreciable murmur  Abdomen: Soft, nontender, BS +  MSK: Warm, Right AKA, left BKA   : Gross hematuria with clot in heath bag  Neurologic: Alert and oriented x4, Cranial nerve II-XII intact, able to move all 4 extremities    VITAL SIGNS: 24 HRS MIN & MAX LAST   Temp  Min: 97.7 °F (36.5 °C)  Max:  99.6 °F (37.6 °C) 99.6 °F (37.6 °C)   BP  Min: 85/55  Max: 153/86 (!) 96/57   Pulse  Min: 70  Max: 81  79   Resp  Min: 16  Max: 18 18   SpO2  Min: 91 %  Max: 100 % 100 %     I reviewed the labs below:  Recent Labs   Lab 11/21/24  1051 11/21/24  1230 11/21/24  2105 11/22/24  0556 11/22/24  1505   WBC 15.08*  --   --  10.50  --    RBC 3.14*  --   --  3.05*  --    HGB 8.1*   < > 7.6* 7.9* 6.7*   HCT 26.2*   < > 24.3* 25.4* 22.0*   MCV 83.4  --   --  83.3  --    MCH 25.8*  --   --  25.9*  --    MCHC 30.9*  --   --  31.1*  --    RDW 17.5*  --   --  17.2*  --      --   --  278  --    MPV 10.7*  --   --  10.3  --     < > = values in this interval not displayed.     Recent Labs   Lab 11/21/24  1051 11/22/24  0556   * 135*   K 4.8 4.6    102   CO2 26 23   BUN 46.2* 39.2*   CREATININE 1.88* 1.31*   CALCIUM 8.4* 8.5*   MG  --  2.10   ALBUMIN 2.5* 2.5*   ALKPHOS 169* 158*   ALT 18 17   AST 15 16   BILITOT 0.5 0.4     Microbiology Results (last 7 days)       ** No results found for the last 168 hours. **           See below for Radiology    Intake and Output:    Intake/Output Summary (Last 24 hours) at 11/22/2024 1855  Last data filed at 11/22/2024 1840  Gross per 24 hour   Intake 960 ml   Output 6225 ml   Net -5265 ml       Assessment/Plan:  Gross Hematuria secondary to Traumatic Sharp exchange at [facility]   Acute blood loss anemia due to above, requiring blood transfusion  Paroxysmal AFibOn long-term oral anticoagulation- currently on hold  Diabetes mellitus type 2 with hyperglycemia  Hypertension, hyperlipidemia  CKD 3B  Hypothyroidism  Diabetic neuropathy/her prevascular disease/peripheral arterial disease- status post right AKA and left BKA  CAD, history of CHF now recovered to EF 50-55%  Morbid obesity with BMI 50.2          Admitted as inpatient on 11/21  On CBI and yet had gross hematuria  Urology on board, appreciate recommendation, patient is status board hand irrigation for Sharp catheter  clot  Sharp exchanged and CVA and fusion at [facility] initiated  Plan for cystoscopy with clot evacuation and fulguration in the morning  NPO after midnight  Hold off on anticoagulation, antiplatelets  Hb 8.1--> 7.9--> 6.7  Ordered 1 unit packed red blood cell transfusion  Hyperglycemia persists, increase insulin sliding scale to medium and increase Lantus to 50 units b.i.d.  A1C 7.5  Creatinine trended down to 1.3  Appropriate home medication for chronic medical condition has been resumed  Consult PT OT as patient coming from [facility]- recommending [facility]  Morning CBC, BMP ordered     VTE Prophylaxis:  SCDs, hold off chemical prophylaxis given gross hematuria     Patient condition:  Stable     Discharge Planning and Disposition/Anticipated discharge:  Return to [facility] once medically cleared by Urology    Critical care note:  Critical care diagnosis:  Acute blood loss anemia due to hematuria requiring blood transfusion  Critical care interventions: Hands-on evaluation, review of labs/radiographs/records and discussion with patient and family if present  Critical care time spent: 35 minutes        All diagnosis and differential diagnosis have been reviewed; assessment and plan has been documented; I have personally reviewed the labs and test results that are presently available; I have reviewed the patients medication list; I have reviewed the consulting providers response and recommendations. I have reviewed or attempted to review medical records based upon their availability    All of the patient's questions have been  addressed and answered. Patient's is agreeable to the above stated plan. I will continue to monitor closely and make adjustments to medical management as needed.    Portions of this note dictated using EMR integrated voice recognition software, and may be subject to voice recognition errors not corrected at proofreading. Please contact writer for clarification if needed.    _____________________________________________________________________    Nutrition Status:  Nutrition consulted. Most recent weight and BMI monitored-     Measurements:  Wt Readings from Last 1 Encounters:   11/22/24 (!) 158.8 kg (350 lb 1.5 oz)   Body mass index is 50.23 kg/m².    Patient has been screened and assessed by RD.    Malnutrition Type:  Context:    Level:      Malnutrition Characteristic Summary:       Interventions/Recommendations (treatment strategy):         A minimum of two characteristics is recommended for diagnosis of either severe or non-severe malnutrition.     Current Med:   amiodarone  100 mg Oral Daily    atorvastatin  40 mg Oral Daily    docusate sodium  100 mg Oral BID    famotidine  20 mg Oral Daily    ferrous sulfate  1 tablet Oral Daily    fluticasone propionate  2 spray Each Nostril Daily    insulin glargine U-100 (Lantus)  50 Units Subcutaneous BID    levothyroxine  150 mcg Oral Before breakfast    mupirocin   Nasal BID    pregabalin  50 mg Oral TID    tamsulosin  0.4 mg Oral Daily      PRN meds:  Current Facility-Administered Medications:     0.9%  NaCl infusion (for blood administration), , Intravenous, Q24H PRN    acetaminophen, 1,000 mg, Oral, Q6H PRN    acetaminophen, 650 mg, Oral, Q4H PRN    dextrose 10%, 12.5 g, Intravenous, PRN    dextrose 10%, 25 g, Intravenous, PRN    glucagon (human recombinant), 1 mg, Intramuscular, PRN    glucose, 16 g, Oral, PRN    glucose, 24 g, Oral, PRN    hyoscyamine, 0.125 mg, Sublingual, Q4H PRN    influenza (adjuvanted), 0.5 mL, Intramuscular, vaccine x 1 dose    insulin aspart U-100, 0-10 Units, Subcutaneous, QID (AC + HS) PRN    naloxone, 0.02 mg, Intravenous, PRN    ondansetron, 4 mg, Intravenous, Q4H PRN    pneumoc 20-wander conj-dip cr(PF), 0.5 mL, Intramuscular, vaccine x 1 dose    prochlorperazine, 5 mg, Intravenous, Q6H PRN    sodium chloride 0.9%, 10 mL, Intravenous, PRN    Continuous infusion:       Radiology:   I have personally reviewed  the images and agree with radiologist report  X-Ray Chest PA Lateral With Lordotic Vie  Indication: Preoperative respiratory evaluation     Findings: No prior studies are available for comparison. Heart size is  normal and lungs are clear bilaterally. Pulmonary vasculature is  normal.     IMPRESSION: No evidence of acute disease.        Electronically Signed By: Boone Burdick MD  Date/Time Signed: 08/13/2018 16:44        Shiv Faust MD  Department of Hospital Medicine   [facility]   11/22/2024

## 2024-11-22 NOTE — PLAN OF CARE
Problem: Infection  Goal: Absence of Infection Signs and Symptoms  Reactivated     Problem: Adult Inpatient Plan of Care  Goal: Plan of Care Review  Reactivated  Goal: Patient-Specific Goal (Individualized)  Reactivated  Goal: Absence of Hospital-Acquired Illness or Injury  Reactivated  Goal: Optimal Comfort and Wellbeing  Reactivated  Goal: Readiness for Transition of Care  Reactivated     Problem: Skin Injury Risk Increased  Goal: Skin Health and Integrity  Reactivated     Problem: Wound  Goal: Optimal Coping  Reactivated  Goal: Optimal Functional Ability  Reactivated  Goal: Absence of Infection Signs and Symptoms  Reactivated  Goal: Improved Oral Intake  Reactivated  Goal: Optimal Pain Control and Function  Reactivated  Goal: Skin Health and Integrity  Reactivated  Goal: Optimal Wound Healing  Reactivated

## 2024-11-22 NOTE — PLAN OF CARE
Problem: Infection  Goal: Absence of Infection Signs and Symptoms  Outcome: Progressing     Problem: Adult Inpatient Plan of Care  Goal: Plan of Care Review  Outcome: Progressing  Goal: Patient-Specific Goal (Individualized)  Outcome: Progressing  Goal: Absence of Hospital-Acquired Illness or Injury  Outcome: Progressing  Goal: Optimal Comfort and Wellbeing  Outcome: Progressing  Goal: Readiness for Transition of Care  Outcome: Progressing     Problem: Skin Injury Risk Increased  Goal: Skin Health and Integrity  Outcome: Progressing     Problem: Wound  Goal: Optimal Coping  Outcome: Progressing  Goal: Optimal Functional Ability  Outcome: Progressing  Goal: Absence of Infection Signs and Symptoms  Outcome: Progressing  Goal: Improved Oral Intake  Outcome: Progressing  Goal: Optimal Pain Control and Function  Outcome: Progressing  Goal: Skin Health and Integrity  Outcome: Progressing  Goal: Optimal Wound Healing  Outcome: Progressing     Problem: Bariatric Environmental Safety  Goal: Safety Maintained with Care  Outcome: Progressing

## 2024-11-22 NOTE — PLAN OF CARE
Problem: Physical Therapy  Goal: Physical Therapy Goal  Description: Goals to be met by: 24     Patient will increase functional independence with mobility by performin. Supine to sit with Set-up Payette  2. Sit to supine with Set-up Payette  3. Rolling to Left and Right with Payette.  4. Bed to chair transfer with Stand-by Assistance using LRAD  5. Wheelchair propulsion x200 feet with Payette using bilateral uppper extremities    Outcome: Progressing

## 2024-11-23 ENCOUNTER — ANESTHESIA (OUTPATIENT)
Dept: SURGERY | Facility: HOSPITAL | Age: 74
End: 2024-11-23
Payer: MEDICARE

## 2024-11-23 LAB
ABO + RH BLD: NORMAL
ABO + RH BLD: NORMAL
ANION GAP SERPL CALC-SCNC: 5 MEQ/L
BLD PROD TYP BPU: NORMAL
BLD PROD TYP BPU: NORMAL
BLOOD UNIT EXPIRATION DATE: NORMAL
BLOOD UNIT EXPIRATION DATE: NORMAL
BLOOD UNIT TYPE CODE: 5100
BLOOD UNIT TYPE CODE: 5100
BUN SERPL-MCNC: 45.7 MG/DL (ref 8.4–25.7)
CALCIUM SERPL-MCNC: 8.3 MG/DL (ref 8.8–10)
CHLORIDE SERPL-SCNC: 103 MMOL/L (ref 98–107)
CO2 SERPL-SCNC: 26 MMOL/L (ref 23–31)
CREAT SERPL-MCNC: 1.56 MG/DL (ref 0.72–1.25)
CREAT/UREA NIT SERPL: 29
CROSSMATCH INTERPRETATION: NORMAL
CROSSMATCH INTERPRETATION: NORMAL
DISPENSE STATUS: NORMAL
DISPENSE STATUS: NORMAL
ERYTHROCYTE [DISTWIDTH] IN BLOOD BY AUTOMATED COUNT: 17 % (ref 11.5–17)
GFR SERPLBLD CREATININE-BSD FMLA CKD-EPI: 47 ML/MIN/1.73/M2
GLUCOSE SERPL-MCNC: 269 MG/DL (ref 82–115)
HCT VFR BLD AUTO: 23 % (ref 42–52)
HGB BLD-MCNC: 7 G/DL (ref 14–18)
MCH RBC QN AUTO: 25.5 PG (ref 27–31)
MCHC RBC AUTO-ENTMCNC: 30.4 G/DL (ref 33–36)
MCV RBC AUTO: 83.6 FL (ref 80–94)
NRBC BLD AUTO-RTO: 0 %
PLATELET # BLD AUTO: 260 X10(3)/MCL (ref 130–400)
PMV BLD AUTO: 10.4 FL (ref 7.4–10.4)
POCT GLUCOSE: 182 MG/DL (ref 70–110)
POCT GLUCOSE: 188 MG/DL (ref 70–110)
POCT GLUCOSE: 265 MG/DL (ref 70–110)
POCT GLUCOSE: 343 MG/DL (ref 70–110)
POTASSIUM SERPL-SCNC: 4.1 MMOL/L (ref 3.5–5.1)
RBC # BLD AUTO: 2.75 X10(6)/MCL (ref 4.7–6.1)
SODIUM SERPL-SCNC: 134 MMOL/L (ref 136–145)
UNIT NUMBER: NORMAL
UNIT NUMBER: NORMAL
WBC # BLD AUTO: 12.24 X10(3)/MCL (ref 4.5–11.5)

## 2024-11-23 PROCEDURE — 37000009 HC ANESTHESIA EA ADD 15 MINS: Performed by: SPECIALIST

## 2024-11-23 PROCEDURE — 25000003 PHARM REV CODE 250: Performed by: NURSE PRACTITIONER

## 2024-11-23 PROCEDURE — 27201423 OPTIME MED/SURG SUP & DEVICES STERILE SUPPLY: Performed by: SPECIALIST

## 2024-11-23 PROCEDURE — 36000706: Performed by: SPECIALIST

## 2024-11-23 PROCEDURE — 63600175 PHARM REV CODE 636 W HCPCS: Performed by: SPECIALIST

## 2024-11-23 PROCEDURE — 63600175 PHARM REV CODE 636 W HCPCS: Performed by: ANESTHESIOLOGY

## 2024-11-23 PROCEDURE — 71000033 HC RECOVERY, INTIAL HOUR: Performed by: SPECIALIST

## 2024-11-23 PROCEDURE — 36430 TRANSFUSION BLD/BLD COMPNT: CPT

## 2024-11-23 PROCEDURE — 63600175 PHARM REV CODE 636 W HCPCS: Performed by: NURSE PRACTITIONER

## 2024-11-23 PROCEDURE — 25000003 PHARM REV CODE 250: Performed by: SPECIALIST

## 2024-11-23 PROCEDURE — 36415 COLL VENOUS BLD VENIPUNCTURE: CPT | Performed by: INTERNAL MEDICINE

## 2024-11-23 PROCEDURE — 0V508ZZ DESTRUCTION OF PROSTATE, VIA NATURAL OR ARTIFICIAL OPENING ENDOSCOPIC: ICD-10-PCS | Performed by: SPECIALIST

## 2024-11-23 PROCEDURE — 36000707: Performed by: SPECIALIST

## 2024-11-23 PROCEDURE — 11000001 HC ACUTE MED/SURG PRIVATE ROOM

## 2024-11-23 PROCEDURE — 63600175 PHARM REV CODE 636 W HCPCS: Performed by: NURSE ANESTHETIST, CERTIFIED REGISTERED

## 2024-11-23 PROCEDURE — P9016 RBC LEUKOCYTES REDUCED: HCPCS | Performed by: INTERNAL MEDICINE

## 2024-11-23 PROCEDURE — 80048 BASIC METABOLIC PNL TOTAL CA: CPT | Performed by: INTERNAL MEDICINE

## 2024-11-23 PROCEDURE — 94799 UNLISTED PULMONARY SVC/PX: CPT

## 2024-11-23 PROCEDURE — 25000003 PHARM REV CODE 250: Performed by: NURSE ANESTHETIST, CERTIFIED REGISTERED

## 2024-11-23 PROCEDURE — P9040 RBC LEUKOREDUCED IRRADIATED: HCPCS | Performed by: INTERNAL MEDICINE

## 2024-11-23 PROCEDURE — 99900031 HC PATIENT EDUCATION (STAT)

## 2024-11-23 PROCEDURE — 86923 COMPATIBILITY TEST ELECTRIC: CPT | Mod: 91 | Performed by: INTERNAL MEDICINE

## 2024-11-23 PROCEDURE — 25000003 PHARM REV CODE 250: Performed by: INTERNAL MEDICINE

## 2024-11-23 PROCEDURE — 85027 COMPLETE CBC AUTOMATED: CPT | Performed by: INTERNAL MEDICINE

## 2024-11-23 PROCEDURE — 99900035 HC TECH TIME PER 15 MIN (STAT)

## 2024-11-23 PROCEDURE — 0TCB8ZZ EXTIRPATION OF MATTER FROM BLADDER, VIA NATURAL OR ARTIFICIAL OPENING ENDOSCOPIC: ICD-10-PCS | Performed by: SPECIALIST

## 2024-11-23 PROCEDURE — 37000008 HC ANESTHESIA 1ST 15 MINUTES: Performed by: SPECIALIST

## 2024-11-23 PROCEDURE — 63600175 PHARM REV CODE 636 W HCPCS: Performed by: INTERNAL MEDICINE

## 2024-11-23 RX ORDER — ONDANSETRON HYDROCHLORIDE 2 MG/ML
4 INJECTION, SOLUTION INTRAVENOUS DAILY PRN
Status: DISCONTINUED | OUTPATIENT
Start: 2024-11-23 | End: 2024-11-23 | Stop reason: HOSPADM

## 2024-11-23 RX ORDER — ONDANSETRON HYDROCHLORIDE 2 MG/ML
INJECTION, SOLUTION INTRAVENOUS
Status: DISCONTINUED | OUTPATIENT
Start: 2024-11-23 | End: 2024-11-23

## 2024-11-23 RX ORDER — HYDROCODONE BITARTRATE AND ACETAMINOPHEN 10; 325 MG/1; MG/1
1 TABLET ORAL EVERY 4 HOURS PRN
Status: DISCONTINUED | OUTPATIENT
Start: 2024-11-23 | End: 2024-11-29 | Stop reason: HOSPADM

## 2024-11-23 RX ORDER — CALCIUM CHLORIDE INJECTION 100 MG/ML
INJECTION, SOLUTION INTRAVENOUS
Status: DISCONTINUED | OUTPATIENT
Start: 2024-11-23 | End: 2024-11-23

## 2024-11-23 RX ORDER — METOCLOPRAMIDE HYDROCHLORIDE 5 MG/ML
10 INJECTION INTRAMUSCULAR; INTRAVENOUS EVERY 10 MIN PRN
Status: DISCONTINUED | OUTPATIENT
Start: 2024-11-23 | End: 2024-11-23 | Stop reason: HOSPADM

## 2024-11-23 RX ORDER — HYDROMORPHONE HYDROCHLORIDE 2 MG/ML
1 INJECTION, SOLUTION INTRAMUSCULAR; INTRAVENOUS; SUBCUTANEOUS
Status: DISCONTINUED | OUTPATIENT
Start: 2024-11-23 | End: 2024-11-29 | Stop reason: HOSPADM

## 2024-11-23 RX ORDER — CEFAZOLIN SODIUM 1 G/3ML
INJECTION, POWDER, FOR SOLUTION INTRAMUSCULAR; INTRAVENOUS
Status: DISCONTINUED | OUTPATIENT
Start: 2024-11-23 | End: 2024-11-23

## 2024-11-23 RX ORDER — HYDROCODONE BITARTRATE AND ACETAMINOPHEN 500; 5 MG/1; MG/1
TABLET ORAL
Status: DISCONTINUED | OUTPATIENT
Start: 2024-11-23 | End: 2024-11-25

## 2024-11-23 RX ORDER — SODIUM CHLORIDE, SODIUM LACTATE, POTASSIUM CHLORIDE, CALCIUM CHLORIDE 600; 310; 30; 20 MG/100ML; MG/100ML; MG/100ML; MG/100ML
INJECTION, SOLUTION INTRAVENOUS CONTINUOUS
Status: DISCONTINUED | OUTPATIENT
Start: 2024-11-23 | End: 2024-11-28

## 2024-11-23 RX ORDER — GLYCOPYRROLATE 0.2 MG/ML
INJECTION INTRAMUSCULAR; INTRAVENOUS
Status: DISCONTINUED | OUTPATIENT
Start: 2024-11-23 | End: 2024-11-23

## 2024-11-23 RX ORDER — HYDROMORPHONE HYDROCHLORIDE 2 MG/ML
0.5 INJECTION, SOLUTION INTRAMUSCULAR; INTRAVENOUS; SUBCUTANEOUS EVERY 5 MIN PRN
Status: DISCONTINUED | OUTPATIENT
Start: 2024-11-23 | End: 2024-11-23 | Stop reason: HOSPADM

## 2024-11-23 RX ORDER — PROPOFOL 10 MG/ML
VIAL (ML) INTRAVENOUS
Status: DISCONTINUED | OUTPATIENT
Start: 2024-11-23 | End: 2024-11-23

## 2024-11-23 RX ORDER — LIDOCAINE HYDROCHLORIDE 20 MG/ML
INJECTION, SOLUTION EPIDURAL; INFILTRATION; INTRACAUDAL; PERINEURAL
Status: DISCONTINUED | OUTPATIENT
Start: 2024-11-23 | End: 2024-11-23

## 2024-11-23 RX ORDER — DEXAMETHASONE SODIUM PHOSPHATE 4 MG/ML
INJECTION, SOLUTION INTRA-ARTICULAR; INTRALESIONAL; INTRAMUSCULAR; INTRAVENOUS; SOFT TISSUE
Status: DISCONTINUED | OUTPATIENT
Start: 2024-11-23 | End: 2024-11-23

## 2024-11-23 RX ORDER — ACETAMINOPHEN 10 MG/ML
1000 INJECTION, SOLUTION INTRAVENOUS ONCE
Status: COMPLETED | OUTPATIENT
Start: 2024-11-23 | End: 2024-11-23

## 2024-11-23 RX ORDER — EPHEDRINE SULFATE 50 MG/ML
INJECTION, SOLUTION INTRAVENOUS
Status: DISCONTINUED | OUTPATIENT
Start: 2024-11-23 | End: 2024-11-23

## 2024-11-23 RX ORDER — DIPHENHYDRAMINE HYDROCHLORIDE 50 MG/ML
25 INJECTION INTRAMUSCULAR; INTRAVENOUS EVERY 6 HOURS PRN
Status: DISCONTINUED | OUTPATIENT
Start: 2024-11-23 | End: 2024-11-23 | Stop reason: HOSPADM

## 2024-11-23 RX ORDER — FENTANYL CITRATE 50 UG/ML
INJECTION, SOLUTION INTRAMUSCULAR; INTRAVENOUS
Status: DISCONTINUED | OUTPATIENT
Start: 2024-11-23 | End: 2024-11-23

## 2024-11-23 RX ADMIN — LIDOCAINE HYDROCHLORIDE 80 MG: 20 INJECTION, SOLUTION INTRAVENOUS at 01:11

## 2024-11-23 RX ADMIN — PROCHLORPERAZINE EDISYLATE 5 MG: 5 INJECTION INTRAMUSCULAR; INTRAVENOUS at 02:11

## 2024-11-23 RX ADMIN — SODIUM CHLORIDE, POTASSIUM CHLORIDE, SODIUM LACTATE AND CALCIUM CHLORIDE: 600; 310; 30; 20 INJECTION, SOLUTION INTRAVENOUS at 06:11

## 2024-11-23 RX ADMIN — CEFAZOLIN 2 G: 330 INJECTION, POWDER, FOR SOLUTION INTRAMUSCULAR; INTRAVENOUS at 01:11

## 2024-11-23 RX ADMIN — PROPOFOL 80 MG: 10 INJECTION, EMULSION INTRAVENOUS at 01:11

## 2024-11-23 RX ADMIN — INSULIN GLARGINE 50 UNITS: 100 INJECTION, SOLUTION SUBCUTANEOUS at 10:11

## 2024-11-23 RX ADMIN — INSULIN GLARGINE 50 UNITS: 100 INJECTION, SOLUTION SUBCUTANEOUS at 09:11

## 2024-11-23 RX ADMIN — PREGABALIN 50 MG: 50 CAPSULE ORAL at 09:11

## 2024-11-23 RX ADMIN — CALCIUM CHLORIDE INJECTION 0.2 G: 100 INJECTION, SOLUTION INTRAVENOUS at 02:11

## 2024-11-23 RX ADMIN — HYDROMORPHONE HYDROCHLORIDE 1 MG: 2 INJECTION INTRAMUSCULAR; INTRAVENOUS; SUBCUTANEOUS at 09:11

## 2024-11-23 RX ADMIN — HYDROMORPHONE HYDROCHLORIDE 0.5 MG: 2 INJECTION INTRAMUSCULAR; INTRAVENOUS; SUBCUTANEOUS at 02:11

## 2024-11-23 RX ADMIN — INSULIN ASPART 2 UNITS: 100 INJECTION, SOLUTION INTRAVENOUS; SUBCUTANEOUS at 03:11

## 2024-11-23 RX ADMIN — MUPIROCIN: 20 OINTMENT TOPICAL at 09:11

## 2024-11-23 RX ADMIN — EPHEDRINE SULFATE 15 MG: 50 INJECTION INTRAVENOUS at 02:11

## 2024-11-23 RX ADMIN — FENTANYL CITRATE 25 MCG: 50 INJECTION, SOLUTION INTRAMUSCULAR; INTRAVENOUS at 01:11

## 2024-11-23 RX ADMIN — MUPIROCIN: 20 OINTMENT TOPICAL at 10:11

## 2024-11-23 RX ADMIN — EPHEDRINE SULFATE 15 MG: 50 INJECTION INTRAVENOUS at 01:11

## 2024-11-23 RX ADMIN — DOCUSATE SODIUM 100 MG: 100 CAPSULE, LIQUID FILLED ORAL at 09:11

## 2024-11-23 RX ADMIN — SODIUM CHLORIDE: 9 INJECTION, SOLUTION INTRAVENOUS at 12:11

## 2024-11-23 RX ADMIN — CALCIUM CHLORIDE INJECTION 0.2 G: 100 INJECTION, SOLUTION INTRAVENOUS at 01:11

## 2024-11-23 RX ADMIN — PREGABALIN 50 MG: 50 CAPSULE ORAL at 03:11

## 2024-11-23 RX ADMIN — SODIUM CHLORIDE, POTASSIUM CHLORIDE, SODIUM LACTATE AND CALCIUM CHLORIDE: 600; 310; 30; 20 INJECTION, SOLUTION INTRAVENOUS at 09:11

## 2024-11-23 RX ADMIN — INSULIN ASPART 4 UNITS: 100 INJECTION, SOLUTION INTRAVENOUS; SUBCUTANEOUS at 09:11

## 2024-11-23 RX ADMIN — GLYCOPYRROLATE 0.2 MG: 0.2 INJECTION INTRAMUSCULAR; INTRAVENOUS at 01:11

## 2024-11-23 RX ADMIN — ONDANSETRON 4 MG: 2 INJECTION INTRAMUSCULAR; INTRAVENOUS at 01:11

## 2024-11-23 RX ADMIN — HYOSCYAMINE SULFATE 0.12 MG: 0.12 TABLET SUBLINGUAL at 04:11

## 2024-11-23 RX ADMIN — HYDROCODONE BITARTRATE AND ACETAMINOPHEN 1 TABLET: 10; 325 TABLET ORAL at 04:11

## 2024-11-23 RX ADMIN — DEXAMETHASONE SODIUM PHOSPHATE 4 MG: 4 INJECTION, SOLUTION INTRA-ARTICULAR; INTRALESIONAL; INTRAMUSCULAR; INTRAVENOUS; SOFT TISSUE at 01:11

## 2024-11-23 RX ADMIN — PREGABALIN 50 MG: 50 CAPSULE ORAL at 10:11

## 2024-11-23 RX ADMIN — ACETAMINOPHEN 1000 MG: 10 INJECTION, SOLUTION INTRAVENOUS at 02:11

## 2024-11-23 RX ADMIN — AMIODARONE HYDROCHLORIDE 100 MG: 100 TABLET ORAL at 10:11

## 2024-11-23 RX ADMIN — LEVOTHYROXINE SODIUM 150 MCG: 150 TABLET ORAL at 06:11

## 2024-11-23 NOTE — TRANSFER OF CARE
Anesthesia Transfer of Care Note    Patient: Landon Aragon    Procedure(s) Performed: Procedure(s) (LRB):  FULGURATION, BLADDER (N/A)  EVACUATION, HEMATOMA (N/A)    Patient location: PACU    Anesthesia Type: general    Transport from OR: Transported from OR on room air with adequate spontaneous ventilation    Post pain: adequate analgesia    Post assessment: no apparent anesthetic complications and tolerated procedure well    Post vital signs: stable    Level of consciousness: awake and alert    Nausea/Vomiting: no nausea/vomiting    Complications: none    Transfer of care protocol was followed

## 2024-11-23 NOTE — ANESTHESIA POSTPROCEDURE EVALUATION
Anesthesia Post Evaluation    Patient: Landon Aragon    Procedure(s) Performed: Procedure(s) (LRB):  FULGURATION, BLADDER (N/A)  EVACUATION, HEMATOMA (N/A)    Final Anesthesia Type: general      Patient location during evaluation: PACU  Patient participation: Yes- Able to Participate  Level of consciousness: awake and alert and oriented  Post-procedure vital signs: reviewed and stable  Pain management: adequate  Airway patency: patent  JUAN mitigation strategies: Verification of full reversal of neuromuscular block  PONV status at discharge: No PONV  Anesthetic complications: no      Cardiovascular status: blood pressure returned to baseline and stable  Respiratory status: spontaneous ventilation and unassisted  Hydration status: euvolemic  Follow-up not needed.  Comments: [facility]              Vitals Value Taken Time   /63 11/23/24 1730   Temp 36.5 °C (97.7 °F) 11/23/24 1526   Pulse 85 11/23/24 1730   Resp 18 11/23/24 1653   SpO2 100 % 11/23/24 1730         Event Time   Out of Recovery 11/23/2024 15:14:00         Pain/Hugo Score: Pain Rating Prior to Med Admin: 6 (11/23/2024  4:53 PM)  Pain Rating Post Med Admin: 3 (11/23/2024  5:38 PM)  Hugo Score: 8 (11/23/2024  2:58 PM)

## 2024-11-23 NOTE — PT/OT/SLP PROGRESS
Occupational Therapy  Visit Attempt     Patient Name:  Landon Aragon   MRN:  [id number]    Patient not seen today secondary to Blood transfusion (pt currently receiving blood transfusion and awaiting surgery.). Will follow-up as available.    11/23/2024

## 2024-11-23 NOTE — OP NOTE
UROLOGY OPERATIVE NOTE    Landon Aragon  1950  [id number]  11/23/2024      Pre-op Diagnosis: Gross Hematuria\    Post-op Diagnosis:  Same    Procedure: 1) cystoscopy with clot evacuation                       2) Fulguration of Prostate and Trigone    Surgeon: [facility]    Anesthesia: GETA    Complications: None    Blood Loss: 50cc - in addition to approximately 300 cc of clot    Indications:  Refractory gross hematuria with anemia    Procedure Details:  Informed consent was obtained, the patient was brought to the cystoscopy area and given general anesthesia in lithotomy position.  He has had bilateral amputations but we were able to get him in lithotomy in a satisfactory manner to to perform the procedure.  The a 26 Monegasque resectoscope was introduced to the bladder the prostatic fossa appeared to be scarred and fixed consistent with previous surgery or procedure.  This also made negotiating the of the scope up to the dome of the bladder difficult.  There was active bleeding in the prostate.  The scope was advanced into the bladder.  An approximately 300 cc of clots were evacuated using the Style Jukebox evacuator.  I did not get a good look of the anterior wall and dome of the bladder.  The posterior wall had copious cautery artifact.  With some difficulty I was able to identify the ureteral orifices.  Using the button on the monopolar I cauterized everything I could see that was bleeding or might bleed in the bladder, and thoroughly within the prostatic fossa.  The resectoscope was withdrawn.  The 24 Monegasque three-way Sharp catheter was placed to continuous bladder irrigation with normal saline.  The Sharp was taped to the leg on traction.  He was started on continuous bladder irrigation with normal saline with clearing of urine.    Disposition: Taken to the PACU in stable condition    Condition: Doing well without problems

## 2024-11-23 NOTE — ANESTHESIA PROCEDURE NOTES
Intubation    Date/Time: 11/23/2024 1:03 PM    Performed by: Forrest Watson CRNA  Authorized by: Luis Miguel Mercedes MD    Intubation:     Induction:  Intravenous    Intubated:  Postinduction    Mask Ventilation:  Not attempted    Attempts:  1    Attempted By:  CRNA    Difficult Airway Encountered?: No      Complications:  None    Airway Device:  Supraglottic airway/LMA    Airway Device Size:  4.0    Style/Cuff Inflation:  Uncuffed    Placement Verified By:  Capnometry    Complicating Factors:  None    Findings Post-Intubation:  BS equal bilateral and atraumatic/condition of teeth unchanged

## 2024-11-23 NOTE — PROGRESS NOTES
UROLOGY  PROGRESS  NOTE    Landon Aragon 1950  [id number]  11/23/2024    Received 1 unit of blood last night   Plans for additional transfusion this morning prior to surgery  The patient is resting in bed  Reports fatigue   He did require hand irrigation overnight by nursing    Afebrile, hypotension this morning  Urine output inaccurate secondary to CBI  WBC 12.24   H&H 7.0/23 (6.7/22 yesterday afternoon)  BUN and creatinine 45.7/1.56    Intake/Output:  No intake/output data recorded.  I/O last 3 completed shifts:  In: 1508.3 [P.O.:1200; Blood:308.3]  Out: 9725 [Urine:9725]     Exam:    NAD  Card: RRR  Resp: unlabored  Abd:  Soft, NT ND  :  Light red urine draining to  bag with CBI in progress, no clot; urethral oozing improved, no active drainage  Extremity:  Bilateral lower extremity amputations    Recent Results (from the past 24 hours)   POCT glucose    Collection Time: 11/22/24 10:58 AM   Result Value Ref Range    POCT Glucose 329 (H) 70 - 110 mg/dL   Hemoglobin and Hematocrit    Collection Time: 11/22/24  3:05 PM   Result Value Ref Range    Hgb 6.7 (L) 14.0 - 18.0 g/dL    Hct 22.0 (L) 42.0 - 52.0 %   CBC Without Differential    Collection Time: 11/23/24  4:53 AM   Result Value Ref Range    WBC 12.24 (H) 4.50 - 11.50 x10(3)/mcL    RBC 2.75 (L) 4.70 - 6.10 x10(6)/mcL    Hgb 7.0 (L) 14.0 - 18.0 g/dL    Hct 23.0 (L) 42.0 - 52.0 %    MCV 83.6 80.0 - 94.0 fL    MCH 25.5 (L) 27.0 - 31.0 pg    MCHC 30.4 (L) 33.0 - 36.0 g/dL    RDW 17.0 11.5 - 17.0 %    Platelet 260 130 - 400 x10(3)/mcL    MPV 10.4 7.4 - 10.4 fL    NRBC% 0.0 %   Basic Metabolic Panel    Collection Time: 11/23/24  4:53 AM   Result Value Ref Range    Sodium 134 (L) 136 - 145 mmol/L    Potassium 4.1 3.5 - 5.1 mmol/L    Chloride 103 98 - 107 mmol/L    CO2 26 23 - 31 mmol/L    Glucose 269 (H) 82 - 115 mg/dL    Blood Urea Nitrogen 45.7 (H) 8.4 - 25.7 mg/dL    Creatinine 1.56 (H) 0.72 - 1.25 mg/dL    BUN/Creatinine Ratio 29     Calcium 8.3 (L) 8.8 -  10.0 mg/dL    Anion Gap 5.0 mEq/L    eGFR 47 mL/min/1.73/m2       Assessment:  Gross hematuria   -began after heath exchange prior to admission, likely heath trauma with blood thinners  -three way heath placed in ED and he was initiated on CBI; no clot obtained with hand irrigation  -s/p cystoscopy with clot evacuation and fulguration of bladder/urethra with Dr. Farrell 10/8/2024  -h/o BPH    Anemia   -transfusion today    Plan:  -planning PRBC transfusion today followed by OR for cystoscopy with possible clot evacuation fulguration.  -informed consent obtained  -continue CBI, titrate as needed to maintain relatively clear urine   -hand irrigate as needed for clots  -recommend continue holding blood thinners  -Discussed plan with patient and nursing, as well as attending      Loreta Schreiber NP

## 2024-11-23 NOTE — ANESTHESIA PREPROCEDURE EVALUATION
"                                                                                                             11/22/2024  Landon Aragon is a 74 y.o., male.  Diagnosis:   Hematuria, unspecified type [R31.9]   Pre-op diagnosis: Hematuria, unspecified type [R31.9]       The pt. comes to [facility] for the noted procedure under GA/LMA vs GETA.  Case: [id number] Date/Time: 11/23/24 0730   Procedures:      FULGURATION, BLADDER      EVACUATION, HEMATOMA   Anesthesia type: General/MAC     PMHx:  No specialty history recorded    Other Medical History   Diabetes mellitus Thyroid disease   Hypertension BPH (benign prostatic hyperplasia)   High cholesterol CHF (congestive heart failure)   Hard of hearing Requires continuous at home supplemental oxygen   Dependent for wheelchair mobility Anticoagulant long-term use   Wound of right foot Renal disorder   PAD (peripheral artery disease)    Problem List  Current as of 11/22/24 2110  Right foot ulcer, with fat layer exposed         PSHx:  Surgical History    BELLOW THE KNEE AMPUTATION LEFT FOOT SURGERY   AMPUTATION OF RIGHT MIDDLE TOE CATARACT EXTRACTION   APPENDECTOMY DEBRIDEMENT OF FOOT   APPLICATION, GRAFT            Vital signs:  Pre Vitals  Current as of 11/22/24 2110  BP: 110/70 Pulse: 78   Resp: 18 SpO2: 100   Temp: 36.8 °C (98.3 °F)   Height: 5' 10" (1.778 m) (11/22/24) Weight: 158.8 kg (350 lb 1.5 oz) (11/22/24)   BMI: 50.2 IBW: 73 kg (160 lb 15 oz)   Last edited 11/22/24 2100 by LUISA        Lab Data:      EKG:    Pre-op Assessment    I have reviewed the Patient Summary Reports.     I have reviewed the Nursing Notes. I have reviewed the NPO Status.   I have reviewed the Medications.     Review of Systems  Anesthesia Hx:  No problems with previous Anesthesia                Social:  Non-Smoker       Hematology/Oncology:  Hematology Normal   Oncology Normal                                   EENT/Dental:  EENT/Dental Normal           Cardiovascular:  Exercise tolerance: good   " Hypertension       CHF           Functional Capacity good / => 4 METS      Congestive Heart Failure (CHF)                Hypertension         Pulmonary:  Pulmonary Normal                       Renal/:  Chronic Renal Disease        Kidney Function/Disease             Hepatic/GI:  Hepatic/GI Normal                    Musculoskeletal:  Musculoskeletal Normal                Neurological:  Neurology Normal                                      Endocrine:  Diabetes    Diabetes                      Dermatological:  Skin Normal    Psych:  Psychiatric Normal                    Physical Exam  General: Alert, Oriented, Well nourished and Cooperative    Airway:  Mallampati: II   Mouth Opening: Normal  TM Distance: Normal  Tongue: Normal  Neck ROM: Normal ROM    Dental:  Intact    Chest/Lungs:  Clear to auscultation, Normal Respiratory Rate    Heart:  Rate: Normal  Rhythm: Regular Rhythm        Anesthesia Plan  Type of Anesthesia, risks & benefits discussed:    Anesthesia Type: Gen Supraglottic Airway, Gen Natural Airway  Intra-op Monitoring Plan: Standard ASA Monitors  Post Op Pain Control Plan: multimodal analgesia and IV/PO Opioids PRN  Induction:  IV  Informed Consent: Informed consent signed with the Patient and all parties understand the risks and agree with anesthesia plan.  All questions answered.   ASA Score: 2  Day of Surgery Review of History & Physical: H&P Update referred to the surgeon/provider.    Ready For Surgery From Anesthesia Perspective.     .

## 2024-11-23 NOTE — PROGRESS NOTES
[facility] Hospital Medicine Progress Note        Chief Complaint: Inpatient Follow-up for dramatic hematuria post Heath exchange    HPI per admitting team: 74-year-old male with known past medical history of congestive heart failure, diabetes mellitus type 2, right AKA and left BKA due to diabetic neuropathy, hard of hearing, hyperlipidemia, hypertension, peripheral arterial disease, currently at [facility] admitted 11/21/2024 for hematuria/penile bleeding after Heath was exchanged yesterday at [facility], concern for traumatic injury.  In the ED patient had Heath placed and CBI started.  Urology consulted.  Patient has no complaints currently, denies any pain.  Urine in Heath bag is still red.  Informed patient that urology has been consulted and they will evaluate him soon.  Verbalized understanding  Did informed patient that will be will hold off his aspirin and Eliquis for now and resume other home medication.  Labs reviewed, WBC 15 K, H&H 8.0/25.4, platelets 372, no left shift  PT INR 17.6/1.4  BMP shows sodium of 133, BUN creatinine 46.2/1.8, glucose 311, , AST/ALT within normal range    Interval Hx:   Patient is laying in bed, reports it hurts.  His ex-wife is at bedside   Discussed urology/anesthesiology has ordered 2 more units of packed red blood cell given hemoglobin did not increase as expected.  Noted gross blood in Heath and Heath bag    Case was discussed with patient's nurse and  on the floor.    Objective/physical exam:  General: In no acute distress, afebrile  Chest: Clear to auscultation bilaterally  Heart: RRR, +S1, S2, no appreciable murmur  Abdomen: Soft, nontender, BS +  MSK: Warm, Right AKA, left BKA   : Gross hematuria with clot in heath bag  Neurologic: Alert and oriented x4, Cranial nerve II-XII intact, able to move all 4 extremities    VITAL SIGNS: 24 HRS MIN & MAX LAST   Temp  Min: 97.7 °F (36.5 °C)  Max: 99.6 °F (37.6 °C) 97.7 °F  (36.5 °C)   BP  Min: 83/47  Max: 165/76 103/63   Pulse  Min: 67  Max: 94  85   Resp  Min: 9  Max: 25 18   SpO2  Min: 94 %  Max: 100 % 100 %     I reviewed the labs below:  Recent Labs   Lab 11/21/24  1051 11/21/24  1230 11/22/24  0556 11/22/24  1505 11/23/24  0453   WBC 15.08*  --  10.50  --  12.24*   RBC 3.14*  --  3.05*  --  2.75*   HGB 8.1*   < > 7.9* 6.7* 7.0*   HCT 26.2*   < > 25.4* 22.0* 23.0*   MCV 83.4  --  83.3  --  83.6   MCH 25.8*  --  25.9*  --  25.5*   MCHC 30.9*  --  31.1*  --  30.4*   RDW 17.5*  --  17.2*  --  17.0     --  278  --  260   MPV 10.7*  --  10.3  --  10.4    < > = values in this interval not displayed.     Recent Labs   Lab 11/21/24  1051 11/22/24  0556 11/23/24  0453   * 135* 134*   K 4.8 4.6 4.1    102 103   CO2 26 23 26   BUN 46.2* 39.2* 45.7*   CREATININE 1.88* 1.31* 1.56*   CALCIUM 8.4* 8.5* 8.3*   MG  --  2.10  --    ALBUMIN 2.5* 2.5*  --    ALKPHOS 169* 158*  --    ALT 18 17  --    AST 15 16  --    BILITOT 0.5 0.4  --      Microbiology Results (last 7 days)       ** No results found for the last 168 hours. **           See below for Radiology    Intake and Output:    Intake/Output Summary (Last 24 hours) at 11/23/2024 1745  Last data filed at 11/23/2024 1505  Gross per 24 hour   Intake 973.33 ml   Output 5375 ml   Net -4401.67 ml       Assessment/Plan:  Gross Hematuria secondary to Traumatic Sharp exchange at [facility] 11/20  Acute blood loss anemia due to above, requiring multiple blood transfusion  Paroxysmal Afib on long-term oral anticoagulation- currently on hold since admission  Diabetes mellitus type 2 with hyperglycemia  Hypertension, hyperlipidemia  CKD 3B- baseline creatinine 1.4-1.8  Hypothyroidism  Diabetic neuropathy/her prevascular disease/peripheral arterial disease- status post right AKA and left BKA  CAD, history of CHF now recovered to EF 50-55%  Morbid obesity with BMI 50.2          Admitted as inpatient on 11/21  On CBI and yet had gross  hematuria  Urology on board, appreciate recommendation, Sharp exchanged and CBI infusing at high rate  Plan for cystoscopy with clot evacuation and fulguration in the morning, given hemoglobin 7.0 this morning, anesthesiology has ordered 2 units packed red blood cell transfusion prior to procedure this afternoon  NPO since midnight  Anticoagulation, antiplatelets on hold since admission  Hb 8.1--> 7.9--> 6.7--> 1 unit packed red blood cell transfusion--> 7.0--> 2 unit PRBC ordered   Hyperglycemia persists, increase insulin sliding scale to medium and increase Lantus to 50 units b.i.d.  A1C 7.5  Creatinine again 1.5, currently at baseline  Appropriate home medication for chronic medical condition has been resumed  Consult PT OT as patient coming from [facility]- recommending moderate therapy  Diabetic cardiac diet postprocedure  Insulin sliding scale medium AC HS  Antihypertensives on hold given softer BP  Morning CBC, BMP ordered     VTE Prophylaxis:  SCDs, hold off chemical prophylaxis given gross hematuria     Patient condition:  Stable     Discharge Planning and Disposition/Anticipated discharge:  Return to [facility] once medically cleared by Urology    Critical care note:  Critical care diagnosis:  Acute blood loss anemia due to hematuria requiring blood transfusion  Critical care interventions: Hands-on evaluation, review of labs/radiographs/records and discussion with patient and family if present  Critical care time spent: 35 minutes        All diagnosis and differential diagnosis have been reviewed; assessment and plan has been documented; I have personally reviewed the labs and test results that are presently available; I have reviewed the patients medication list; I have reviewed the consulting providers response and recommendations. I have reviewed or attempted to review medical records based upon their availability    All of the patient's questions have been  addressed and answered. Patient's is agreeable to  the above stated plan. I will continue to monitor closely and make adjustments to medical management as needed.    Portions of this note dictated using EMR integrated voice recognition software, and may be subject to voice recognition errors not corrected at proofreading. Please contact writer for clarification if needed.   _____________________________________________________________________    Nutrition Status:  Nutrition consulted. Most recent weight and BMI monitored-     Measurements:  Wt Readings from Last 1 Encounters:   11/22/24 (!) 158.8 kg (350 lb 1.5 oz)   Body mass index is 50.23 kg/m².    Patient has been screened and assessed by RD.    Malnutrition Type:  Context:    Level:      Malnutrition Characteristic Summary:       Interventions/Recommendations (treatment strategy):         A minimum of two characteristics is recommended for diagnosis of either severe or non-severe malnutrition.     Current Med:   amiodarone  100 mg Oral Daily    atorvastatin  40 mg Oral Daily    docusate sodium  100 mg Oral BID    famotidine  20 mg Oral Daily    ferrous sulfate  1 tablet Oral Daily    fluticasone propionate  2 spray Each Nostril Daily    insulin glargine U-100 (Lantus)  50 Units Subcutaneous BID    levothyroxine  150 mcg Oral Before breakfast    mupirocin   Nasal BID    pregabalin  50 mg Oral TID    tamsulosin  0.4 mg Oral Daily      PRN meds:  Current Facility-Administered Medications:     0.9%  NaCl infusion (for blood administration), , Intravenous, Q24H PRN    0.9%  NaCl infusion (for blood administration), , Intravenous, Q24H PRN    acetaminophen, 1,000 mg, Oral, Q6H PRN    acetaminophen, 650 mg, Oral, Q4H PRN    dextrose 10%, 12.5 g, Intravenous, PRN    dextrose 10%, 25 g, Intravenous, PRN    glucagon (human recombinant), 1 mg, Intramuscular, PRN    glucose, 16 g, Oral, PRN    glucose, 24 g, Oral, PRN    HYDROcodone-acetaminophen, 1 tablet, Oral, Q4H PRN    HYDROmorphone, 1 mg, Intravenous, Q2H PRN     hyoscyamine, 0.125 mg, Sublingual, Q4H PRN    influenza (adjuvanted), 0.5 mL, Intramuscular, vaccine x 1 dose    insulin aspart U-100, 0-10 Units, Subcutaneous, QID (AC + HS) PRN    naloxone, 0.02 mg, Intravenous, PRN    ondansetron, 4 mg, Intravenous, Q4H PRN    pneumoc 20-wander conj-dip cr(PF), 0.5 mL, Intramuscular, vaccine x 1 dose    prochlorperazine, 5 mg, Intravenous, Q6H PRN    sodium chloride 0.9%, 10 mL, Intravenous, PRN    Continuous infusion:   lactated ringers   Intravenous Continuous 100 mL/hr at 11/23/24 0645 New Bag at 11/23/24 0645        Radiology:   I have personally reviewed the images and agree with radiologist report  X-Ray Chest PA Lateral With Lordotic Vie  Indication: Preoperative respiratory evaluation     Findings: No prior studies are available for comparison. Heart size is  normal and lungs are clear bilaterally. Pulmonary vasculature is  normal.     IMPRESSION: No evidence of acute disease.        Electronically Signed By: Heavenly RAYA, Boone INMAN  Date/Time Signed: 08/13/2018 16:44        Shiv Faust MD  Department of Hospital Medicine   [facility]   11/23/2024

## 2024-11-23 NOTE — PLAN OF CARE
Problem: Infection  Goal: Absence of Infection Signs and Symptoms  11/23/2024 1332 by Hanna Carlson RN  Outcome: Progressing  11/23/2024 1320 by Hanna Carlson RN  Outcome: Progressing     Problem: Adult Inpatient Plan of Care  Goal: Plan of Care Review  11/23/2024 1332 by Hanna Carlson RN  Outcome: Progressing  11/23/2024 1320 by Hanna Carlson RN  Outcome: Progressing  Goal: Patient-Specific Goal (Individualized)  11/23/2024 1332 by Hanna Carlson RN  Outcome: Progressing  11/23/2024 1320 by Hanna Carlson RN  Outcome: Progressing  Goal: Absence of Hospital-Acquired Illness or Injury  11/23/2024 1332 by Hanna Carlson RN  Outcome: Progressing  11/23/2024 1320 by Hanna Carlson RN  Outcome: Progressing  Goal: Optimal Comfort and Wellbeing  11/23/2024 1332 by Hanna Carlson RN  Outcome: Progressing  11/23/2024 1320 by Hanna Carlson RN  Outcome: Progressing  Goal: Readiness for Transition of Care  11/23/2024 1332 by Hanna Carlson RN  Outcome: Progressing  11/23/2024 1320 by Hanna Carlson RN  Outcome: Progressing     Problem: Skin Injury Risk Increased  Goal: Skin Health and Integrity  11/23/2024 1332 by Hanna Carlson RN  Outcome: Progressing  11/23/2024 1320 by Hanna Carlson RN  Outcome: Progressing     Problem: Wound  Goal: Optimal Coping  11/23/2024 1332 by Hanna Carlson RN  Outcome: Progressing  11/23/2024 1320 by Hanna Carlson RN  Outcome: Progressing  Goal: Optimal Functional Ability  11/23/2024 1332 by Hanna Carlson RN  Outcome: Progressing  11/23/2024 1320 by Hanna Carlson RN  Outcome: Progressing  Goal: Absence of Infection Signs and Symptoms  11/23/2024 1332 by Hanna Carlson RN  Outcome: Progressing  11/23/2024 1320 by Hanna Carlson RN  Outcome: Progressing  Goal: Improved Oral Intake  11/23/2024 1332 by Hanna Carlson RN  Outcome: Progressing  11/23/2024 1320 by Carlson, Hanna, RN  Outcome: Progressing  Goal: Optimal Pain Control and Function  11/23/2024 1332 by Aldo,  MARIFER Ferreira  Outcome: Progressing  11/23/2024 1320 by Hanna Carlson RN  Outcome: Progressing  Goal: Skin Health and Integrity  11/23/2024 1332 by Hanna Carlson RN  Outcome: Progressing  11/23/2024 1320 by Hanna Carlson RN  Outcome: Progressing  Goal: Optimal Wound Healing  11/23/2024 1332 by Hanna Carlson RN  Outcome: Progressing  11/23/2024 1320 by Hanna Carlson RN  Outcome: Progressing     Problem: Bariatric Environmental Safety  Goal: Safety Maintained with Care  11/23/2024 1332 by Hanna Carlson RN  Outcome: Progressing  11/23/2024 1320 by Hanna Carlson, RN  Outcome: Progressing

## 2024-11-24 LAB
ANION GAP SERPL CALC-SCNC: 9 MEQ/L
BUN SERPL-MCNC: 33.4 MG/DL (ref 8.4–25.7)
CALCIUM SERPL-MCNC: 9 MG/DL (ref 8.8–10)
CHLORIDE SERPL-SCNC: 103 MMOL/L (ref 98–107)
CO2 SERPL-SCNC: 27 MMOL/L (ref 23–31)
CREAT SERPL-MCNC: 1.19 MG/DL (ref 0.72–1.25)
CREAT/UREA NIT SERPL: 28
ERYTHROCYTE [DISTWIDTH] IN BLOOD BY AUTOMATED COUNT: 15.7 % (ref 11.5–17)
GFR SERPLBLD CREATININE-BSD FMLA CKD-EPI: >60 ML/MIN/1.73/M2
GLUCOSE SERPL-MCNC: 212 MG/DL (ref 82–115)
HCT VFR BLD AUTO: 26.3 % (ref 42–52)
HGB BLD-MCNC: 8.5 G/DL (ref 14–18)
MCH RBC QN AUTO: 26.7 PG (ref 27–31)
MCHC RBC AUTO-ENTMCNC: 32.3 G/DL (ref 33–36)
MCV RBC AUTO: 82.7 FL (ref 80–94)
NRBC BLD AUTO-RTO: 0 %
PLATELET # BLD AUTO: 242 X10(3)/MCL (ref 130–400)
PMV BLD AUTO: 10.6 FL (ref 7.4–10.4)
POCT GLUCOSE: 222 MG/DL (ref 70–110)
POCT GLUCOSE: 246 MG/DL (ref 70–110)
POCT GLUCOSE: 262 MG/DL (ref 70–110)
POCT GLUCOSE: 277 MG/DL (ref 70–110)
POTASSIUM SERPL-SCNC: 5.2 MMOL/L (ref 3.5–5.1)
RBC # BLD AUTO: 3.18 X10(6)/MCL (ref 4.7–6.1)
SODIUM SERPL-SCNC: 139 MMOL/L (ref 136–145)
WBC # BLD AUTO: 12.61 X10(3)/MCL (ref 4.5–11.5)

## 2024-11-24 PROCEDURE — 25000003 PHARM REV CODE 250: Performed by: SPECIALIST

## 2024-11-24 PROCEDURE — 99900031 HC PATIENT EDUCATION (STAT)

## 2024-11-24 PROCEDURE — 80048 BASIC METABOLIC PNL TOTAL CA: CPT | Performed by: INTERNAL MEDICINE

## 2024-11-24 PROCEDURE — 25000003 PHARM REV CODE 250: Performed by: INTERNAL MEDICINE

## 2024-11-24 PROCEDURE — 94761 N-INVAS EAR/PLS OXIMETRY MLT: CPT

## 2024-11-24 PROCEDURE — 94799 UNLISTED PULMONARY SVC/PX: CPT

## 2024-11-24 PROCEDURE — 27000221 HC OXYGEN, UP TO 24 HOURS

## 2024-11-24 PROCEDURE — 63600175 PHARM REV CODE 636 W HCPCS: Performed by: NURSE PRACTITIONER

## 2024-11-24 PROCEDURE — 63600175 PHARM REV CODE 636 W HCPCS: Performed by: INTERNAL MEDICINE

## 2024-11-24 PROCEDURE — 85027 COMPLETE CBC AUTOMATED: CPT | Performed by: INTERNAL MEDICINE

## 2024-11-24 PROCEDURE — 36415 COLL VENOUS BLD VENIPUNCTURE: CPT | Performed by: INTERNAL MEDICINE

## 2024-11-24 PROCEDURE — 11000001 HC ACUTE MED/SURG PRIVATE ROOM

## 2024-11-24 PROCEDURE — 94760 N-INVAS EAR/PLS OXIMETRY 1: CPT

## 2024-11-24 RX ORDER — INSULIN GLARGINE 100 [IU]/ML
60 INJECTION, SOLUTION SUBCUTANEOUS 2 TIMES DAILY
Status: DISCONTINUED | OUTPATIENT
Start: 2024-11-24 | End: 2024-11-26

## 2024-11-24 RX ADMIN — INSULIN GLARGINE 60 UNITS: 100 INJECTION, SOLUTION SUBCUTANEOUS at 09:11

## 2024-11-24 RX ADMIN — FAMOTIDINE 20 MG: 20 TABLET, FILM COATED ORAL at 09:11

## 2024-11-24 RX ADMIN — INSULIN ASPART 6 UNITS: 100 INJECTION, SOLUTION INTRAVENOUS; SUBCUTANEOUS at 12:11

## 2024-11-24 RX ADMIN — LEVOTHYROXINE SODIUM 150 MCG: 150 TABLET ORAL at 06:11

## 2024-11-24 RX ADMIN — PREGABALIN 50 MG: 50 CAPSULE ORAL at 09:11

## 2024-11-24 RX ADMIN — INSULIN GLARGINE 50 UNITS: 100 INJECTION, SOLUTION SUBCUTANEOUS at 09:11

## 2024-11-24 RX ADMIN — INSULIN ASPART 3 UNITS: 100 INJECTION, SOLUTION INTRAVENOUS; SUBCUTANEOUS at 09:11

## 2024-11-24 RX ADMIN — SODIUM ZIRCONIUM CYCLOSILICATE 10 G: 10 POWDER, FOR SUSPENSION ORAL at 12:11

## 2024-11-24 RX ADMIN — HYDROCODONE BITARTRATE AND ACETAMINOPHEN 1 TABLET: 10; 325 TABLET ORAL at 07:11

## 2024-11-24 RX ADMIN — PREGABALIN 50 MG: 50 CAPSULE ORAL at 02:11

## 2024-11-24 RX ADMIN — SODIUM CHLORIDE, POTASSIUM CHLORIDE, SODIUM LACTATE AND CALCIUM CHLORIDE: 600; 310; 30; 20 INJECTION, SOLUTION INTRAVENOUS at 06:11

## 2024-11-24 RX ADMIN — MUPIROCIN: 20 OINTMENT TOPICAL at 09:11

## 2024-11-24 RX ADMIN — DOCUSATE SODIUM 100 MG: 100 CAPSULE, LIQUID FILLED ORAL at 09:11

## 2024-11-24 RX ADMIN — ATORVASTATIN CALCIUM 40 MG: 40 TABLET, FILM COATED ORAL at 09:11

## 2024-11-24 RX ADMIN — TAMSULOSIN HYDROCHLORIDE 0.4 MG: 0.4 CAPSULE ORAL at 09:11

## 2024-11-24 RX ADMIN — HYDROCODONE BITARTRATE AND ACETAMINOPHEN 1 TABLET: 10; 325 TABLET ORAL at 09:11

## 2024-11-24 RX ADMIN — AMIODARONE HYDROCHLORIDE 100 MG: 100 TABLET ORAL at 09:11

## 2024-11-24 RX ADMIN — HYDROCODONE BITARTRATE AND ACETAMINOPHEN 1 TABLET: 10; 325 TABLET ORAL at 02:11

## 2024-11-24 RX ADMIN — FERROUS SULFATE TAB 325 MG (65 MG ELEMENTAL FE) 1 EACH: 325 (65 FE) TAB at 09:11

## 2024-11-24 RX ADMIN — INSULIN ASPART 4 UNITS: 100 INJECTION, SOLUTION INTRAVENOUS; SUBCUTANEOUS at 05:11

## 2024-11-24 NOTE — PROGRESS NOTES
[facility] Hospital Medicine Progress Note        Chief Complaint: Inpatient Follow-up for dramatic hematuria post Heath exchange    HPI per admitting team: 74-year-old male with known past medical history of congestive heart failure, diabetes mellitus type 2, right AKA and left BKA due to diabetic neuropathy, hard of hearing, hyperlipidemia, hypertension, peripheral arterial disease, currently at [facility] admitted 11/21/2024 for hematuria/penile bleeding after Heath was exchanged yesterday at [facility], concern for traumatic injury.  In the ED patient had Heath placed and CBI started.  Urology consulted.  Patient has no complaints currently, denies any pain.  Urine in Heath bag is still red.  Informed patient that urology has been consulted and they will evaluate him soon.  Verbalized understanding  Did informed patient that will be will hold off his aspirin and Eliquis for now and resume other home medication.  Labs reviewed, WBC 15 K, H&H 8.0/25.4, platelets 372, no left shift  PT INR 17.6/1.4  BMP shows sodium of 133, BUN creatinine 46.2/1.8, glucose 311, , AST/ALT within normal range    Interval Hx:   Patient is laying in bed, reports pain postprocedure.  Patient is on Norco   Discussed hemoglobin stable  Noted pink urine in Heath bag  Case was discussed with patient's nurse on the floor.    Objective/physical exam:  General: In no acute distress, afebrile  Chest: Clear to auscultation bilaterally  Heart: RRR, +S1, S2, no appreciable murmur  Abdomen: Soft, nontender, BS +  MSK: Warm, Right AKA, left BKA   : Gross hematuria with clot in heath bag  Neurologic: Alert and oriented x4, Cranial nerve II-XII intact, able to move all 4 extremities    VITAL SIGNS: 24 HRS MIN & MAX LAST   Temp  Min: 97.6 °F (36.4 °C)  Max: 98.5 °F (36.9 °C) 98 °F (36.7 °C)   BP  Min: 97/52  Max: 165/76 (!) 108/45   Pulse  Min: 67  Max: 94  75   Resp  Min: 9  Max: 25 16   SpO2  Min: 94 %  Max:  100 % 98 %     I reviewed the labs below:  Recent Labs   Lab 11/22/24  0556 11/22/24  1505 11/23/24  0453 11/24/24  0558   WBC 10.50  --  12.24* 12.61*   RBC 3.05*  --  2.75* 3.18*   HGB 7.9* 6.7* 7.0* 8.5*   HCT 25.4* 22.0* 23.0* 26.3*   MCV 83.3  --  83.6 82.7   MCH 25.9*  --  25.5* 26.7*   MCHC 31.1*  --  30.4* 32.3*   RDW 17.2*  --  17.0 15.7     --  260 242   MPV 10.3  --  10.4 10.6*     Recent Labs   Lab 11/21/24  1051 11/22/24  0556 11/23/24  0453 11/24/24  0558   * 135* 134* 139   K 4.8 4.6 4.1 5.2*    102 103 103   CO2 26 23 26 27   BUN 46.2* 39.2* 45.7* 33.4*   CREATININE 1.88* 1.31* 1.56* 1.19   CALCIUM 8.4* 8.5* 8.3* 9.0   MG  --  2.10  --   --    ALBUMIN 2.5* 2.5*  --   --    ALKPHOS 169* 158*  --   --    ALT 18 17  --   --    AST 15 16  --   --    BILITOT 0.5 0.4  --   --      Microbiology Results (last 7 days)       ** No results found for the last 168 hours. **           See below for Radiology    Intake and Output:    Intake/Output Summary (Last 24 hours) at 11/24/2024 1047  Last data filed at 11/24/2024 0740  Gross per 24 hour   Intake 905 ml   Output 2225 ml   Net -1320 ml       Assessment/Plan:  Gross Hematuria secondary to Traumatic Sharp exchange at [facility] 11/20  Acute blood loss anemia due to above, requiring multiple blood transfusion  Paroxysmal Afib on long-term oral anticoagulation- currently on hold since admission  Diabetes mellitus type 2 with hyperglycemia  Hypertension, hyperlipidemia  CKD 3B- baseline creatinine 1.4-1.8  Hypothyroidism  Diabetic neuropathy/her prevascular disease/peripheral arterial disease- status post right AKA and left BKA  CAD, history of CHF now recovered to EF 50-55%  Morbid obesity with BMI 50.2  Hyperkalemia          Admitted as inpatient on 11/21  On CBI  Urology on board, appreciate recommendation, Sharp exchanged and CBI infusing at high rate  11/23- cystoscopy with clot evacuation and fulguration  Patient has received total of 3  units packed red blood cells since admission, hemoglobin now 8.5  Anticoagulation, antiplatelets on hold since admission  Hyperglycemia persists, increase insulin sliding scale to medium and increase Lantus to 60 units b.i.d.  A1C 7.5  Creatinine 1.1, baseline CKD 3B  Potassium 5.2, ordered Lokelma 10 g p.o. x1  Appropriate home medication for chronic medical condition has been resumed  Consult PT OT as patient coming from [facility]- recommending moderate therapy  Diabetic cardiac diet postprocedure  Insulin sliding scale medium AC HS  Antihypertensives on hold given softer BP  Morning CBC, BMP ordered     VTE Prophylaxis:  SCDs, hold off chemical prophylaxis given gross hematuria, resume Eliquis/aspirin once cleared by Urology     Patient condition:  Stable     Discharge Planning and Disposition/Anticipated discharge:  Return to [facility] once medically cleared by Urology          All diagnosis and differential diagnosis have been reviewed; assessment and plan has been documented; I have personally reviewed the labs and test results that are presently available; I have reviewed the patients medication list; I have reviewed the consulting providers response and recommendations. I have reviewed or attempted to review medical records based upon their availability    All of the patient's questions have been  addressed and answered. Patient's is agreeable to the above stated plan. I will continue to monitor closely and make adjustments to medical management as needed.    Portions of this note dictated using EMR integrated voice recognition software, and may be subject to voice recognition errors not corrected at proofreading. Please contact writer for clarification if needed.   _____________________________________________________________________    Nutrition Status:  Nutrition consulted. Most recent weight and BMI monitored-     Measurements:  Wt Readings from Last 1 Encounters:   11/22/24 (!) 158.8 kg (350 lb 1.5 oz)    Body mass index is 50.23 kg/m².    Patient has been screened and assessed by RD.    Malnutrition Type:  Context:    Level:      Malnutrition Characteristic Summary:       Interventions/Recommendations (treatment strategy):         A minimum of two characteristics is recommended for diagnosis of either severe or non-severe malnutrition.     Current Med:   amiodarone  100 mg Oral Daily    atorvastatin  40 mg Oral Daily    docusate sodium  100 mg Oral BID    famotidine  20 mg Oral Daily    ferrous sulfate  1 tablet Oral Daily    fluticasone propionate  2 spray Each Nostril Daily    insulin glargine U-100 (Lantus)  60 Units Subcutaneous BID    levothyroxine  150 mcg Oral Before breakfast    mupirocin   Nasal BID    pregabalin  50 mg Oral TID    sodium zirconium cyclosilicate  10 g Oral Once    tamsulosin  0.4 mg Oral Daily      PRN meds:  Current Facility-Administered Medications:     0.9%  NaCl infusion (for blood administration), , Intravenous, Q24H PRN    0.9%  NaCl infusion (for blood administration), , Intravenous, Q24H PRN    acetaminophen, 1,000 mg, Oral, Q6H PRN    acetaminophen, 650 mg, Oral, Q4H PRN    dextrose 10%, 12.5 g, Intravenous, PRN    dextrose 10%, 25 g, Intravenous, PRN    glucagon (human recombinant), 1 mg, Intramuscular, PRN    glucose, 16 g, Oral, PRN    glucose, 24 g, Oral, PRN    HYDROcodone-acetaminophen, 1 tablet, Oral, Q4H PRN    HYDROmorphone, 1 mg, Intravenous, Q2H PRN    hyoscyamine, 0.125 mg, Sublingual, Q4H PRN    influenza (adjuvanted), 0.5 mL, Intramuscular, vaccine x 1 dose    insulin aspart U-100, 0-10 Units, Subcutaneous, QID (AC + HS) PRN    naloxone, 0.02 mg, Intravenous, PRN    ondansetron, 4 mg, Intravenous, Q4H PRN    pneumoc 20-wander conj-dip cr(PF), 0.5 mL, Intramuscular, vaccine x 1 dose    prochlorperazine, 5 mg, Intravenous, Q6H PRN    sodium chloride 0.9%, 10 mL, Intravenous, PRN    Continuous infusion:   lactated ringers   Intravenous Continuous 100 mL/hr at 11/24/24  0618 New Bag at 11/24/24 0618        Radiology:   I have personally reviewed the images and agree with radiologist report  X-Ray Chest PA Lateral With Lordotic Vie  Indication: Preoperative respiratory evaluation     Findings: No prior studies are available for comparison. Heart size is  normal and lungs are clear bilaterally. Pulmonary vasculature is  normal.     IMPRESSION: No evidence of acute disease.        Electronically Signed By: Heavenly RAYA, Boone INMAN  Date/Time Signed: 08/13/2018 16:44        Shiv Faust MD  Department of Hospital Medicine   [facility]   11/24/2024

## 2024-11-24 NOTE — PT/OT/SLP PROGRESS
Occupational Therapy  Visit Attempt     Patient Name:  Landon Aragon   MRN:  [id number]    Patient not seen today secondary to Pain (pt reporting increased pain this AM s/p surgery; requesting hold today's date.). Will follow-up as available.    11/24/2024

## 2024-11-24 NOTE — PROGRESS NOTES
Landon Aragon 1950  [id number]  11/24/2024    Subjective:   Patient is resting comfortably  Still with pink tinged urine on medium drip  H&H stable, vital signs stable  Patient denies previous history of prostate surgery although intraoperative findings consistent with previous procedure  Continue CBI for now          Objective:  Wt Readings from Last 3 Encounters:   11/22/24 (!) 158.8 kg (350 lb 1.5 oz)   01/13/23 (!) 158.8 kg (350 lb)   09/25/18 127 kg (279 lb 15.8 oz)     Temp Readings from Last 3 Encounters:   11/24/24 98.6 °F (37 °C) (Oral)   01/13/23 98.4 °F (36.9 °C) (Temporal)     BP Readings from Last 3 Encounters:   11/24/24 (!) 115/53   01/13/23 (!) 156/84   09/25/18 112/69     Pulse Readings from Last 3 Encounters:   11/24/24 76   01/13/23 60       Intake/Output:  I/O this shift:  In: -   Out: 1600 [Urine:1600]  I/O last 3 completed shifts:  In: 1453.3 [P.O.:770; Blood:583.3; IV Piggyback:100]  Out: 4125 [Urine:4025; Blood:100]       Recent Results (from the past 24 hours)   POCT glucose    Collection Time: 11/23/24  3:39 PM   Result Value Ref Range    POCT Glucose 188 (H) 70 - 110 mg/dL   POCT glucose    Collection Time: 11/23/24  9:00 PM   Result Value Ref Range    POCT Glucose 343 (H) 70 - 110 mg/dL   POCT glucose    Collection Time: 11/24/24  4:44 AM   Result Value Ref Range    POCT Glucose 246 (H) 70 - 110 mg/dL   CBC Without Differential    Collection Time: 11/24/24  5:58 AM   Result Value Ref Range    WBC 12.61 (H) 4.50 - 11.50 x10(3)/mcL    RBC 3.18 (L) 4.70 - 6.10 x10(6)/mcL    Hgb 8.5 (L) 14.0 - 18.0 g/dL    Hct 26.3 (L) 42.0 - 52.0 %    MCV 82.7 80.0 - 94.0 fL    MCH 26.7 (L) 27.0 - 31.0 pg    MCHC 32.3 (L) 33.0 - 36.0 g/dL    RDW 15.7 11.5 - 17.0 %    Platelet 242 130 - 400 x10(3)/mcL    MPV 10.6 (H) 7.4 - 10.4 fL    NRBC% 0.0 %   Basic Metabolic Panel    Collection Time: 11/24/24  5:58 AM   Result Value Ref Range    Sodium 139 136 - 145 mmol/L    Potassium 5.2 (H) 3.5 - 5.1 mmol/L     Chloride 103 98 - 107 mmol/L    CO2 27 23 - 31 mmol/L    Glucose 212 (H) 82 - 115 mg/dL    Blood Urea Nitrogen 33.4 (H) 8.4 - 25.7 mg/dL    Creatinine 1.19 0.72 - 1.25 mg/dL    BUN/Creatinine Ratio 28     Calcium 9.0 8.8 - 10.0 mg/dL    Anion Gap 9.0 mEq/L    eGFR >60 mL/min/1.73/m2   POCT glucose    Collection Time: 11/24/24 10:45 AM   Result Value Ref Range    POCT Glucose 262 (H) 70 - 110 mg/dL       Imaging Results    None           Assessment/Plan:      Pietro Chavis MD

## 2024-11-24 NOTE — PLAN OF CARE
Problem: Infection  Goal: Absence of Infection Signs and Symptoms  Outcome: Progressing     Problem: Adult Inpatient Plan of Care  Goal: Plan of Care Review  Outcome: Progressing  Goal: Patient-Specific Goal (Individualized)  Outcome: Progressing  Goal: Absence of Hospital-Acquired Illness or Injury  Outcome: Progressing  Goal: Optimal Comfort and Wellbeing  Outcome: Progressing  Goal: Readiness for Transition of Care  Outcome: Progressing     Problem: Skin Injury Risk Increased  Goal: Skin Health and Integrity  Outcome: Progressing     Problem: Wound  Goal: Optimal Coping  Outcome: Progressing  Goal: Optimal Functional Ability  Outcome: Progressing  Goal: Absence of Infection Signs and Symptoms  Outcome: Progressing  Goal: Improved Oral Intake  Outcome: Progressing  Goal: Optimal Pain Control and Function  Outcome: Progressing  Goal: Skin Health and Integrity  Outcome: Progressing  Goal: Optimal Wound Healing  Outcome: Progressing     Problem: Bariatric Environmental Safety  Goal: Safety Maintained with Care  Outcome: Progressing     Problem: Fall Injury Risk  Goal: Absence of Fall and Fall-Related Injury  Outcome: Progressing

## 2024-11-25 LAB
ABO + RH BLD: NORMAL
ANION GAP SERPL CALC-SCNC: 9 MEQ/L
BACTERIA #/AREA URNS AUTO: ABNORMAL /HPF
BILIRUB UR QL STRIP.AUTO: NEGATIVE
BLD PROD TYP BPU: NORMAL
BLOOD UNIT EXPIRATION DATE: NORMAL
BLOOD UNIT TYPE CODE: 5100
BUN SERPL-MCNC: 29.3 MG/DL (ref 8.4–25.7)
CALCIUM SERPL-MCNC: 8.4 MG/DL (ref 8.8–10)
CHLORIDE SERPL-SCNC: 104 MMOL/L (ref 98–107)
CLARITY UR: ABNORMAL
CO2 SERPL-SCNC: 24 MMOL/L (ref 23–31)
COLOR UR AUTO: COLORLESS
CREAT SERPL-MCNC: 0.85 MG/DL (ref 0.72–1.25)
CREAT/UREA NIT SERPL: 34
CROSSMATCH INTERPRETATION: NORMAL
DISPENSE STATUS: NORMAL
ERYTHROCYTE [DISTWIDTH] IN BLOOD BY AUTOMATED COUNT: 16.3 % (ref 11.5–17)
GFR SERPLBLD CREATININE-BSD FMLA CKD-EPI: >60 ML/MIN/1.73/M2
GLUCOSE SERPL-MCNC: 35 MG/DL (ref 82–115)
GLUCOSE UR QL STRIP: NORMAL
GROUP & RH: NORMAL
HCT VFR BLD AUTO: 23.3 % (ref 42–52)
HGB BLD-MCNC: 7.6 G/DL (ref 14–18)
HGB UR QL STRIP: ABNORMAL
INDIRECT COOMBS: NORMAL
KETONES UR QL STRIP: NEGATIVE
LEUKOCYTE ESTERASE UR QL STRIP: 500
MCH RBC QN AUTO: 27.1 PG (ref 27–31)
MCHC RBC AUTO-ENTMCNC: 32.6 G/DL (ref 33–36)
MCV RBC AUTO: 83.2 FL (ref 80–94)
NITRITE UR QL STRIP: NEGATIVE
NRBC BLD AUTO-RTO: 0 %
PH UR STRIP: 5 [PH]
PLATELET # BLD AUTO: 205 X10(3)/MCL (ref 130–400)
PMV BLD AUTO: 10.1 FL (ref 7.4–10.4)
POCT GLUCOSE: 118 MG/DL (ref 70–110)
POCT GLUCOSE: 248 MG/DL (ref 70–110)
POCT GLUCOSE: 251 MG/DL (ref 70–110)
POCT GLUCOSE: 269 MG/DL (ref 70–110)
POCT GLUCOSE: 28 MG/DL (ref 70–110)
POCT GLUCOSE: 43 MG/DL (ref 70–110)
POCT GLUCOSE: 69 MG/DL (ref 70–110)
POCT GLUCOSE: 70 MG/DL (ref 70–110)
POTASSIUM SERPL-SCNC: 4.2 MMOL/L (ref 3.5–5.1)
PROT UR QL STRIP: ABNORMAL
RBC # BLD AUTO: 2.8 X10(6)/MCL (ref 4.7–6.1)
RBC #/AREA URNS AUTO: ABNORMAL /HPF
SODIUM SERPL-SCNC: 137 MMOL/L (ref 136–145)
SP GR UR STRIP.AUTO: 1 (ref 1–1.03)
SPECIMEN OUTDATE: NORMAL
SQUAMOUS #/AREA URNS LPF: ABNORMAL /HPF
UNIT NUMBER: NORMAL
UROBILINOGEN UR STRIP-ACNC: NORMAL
WBC # BLD AUTO: 13.26 X10(3)/MCL (ref 4.5–11.5)
WBC #/AREA URNS AUTO: ABNORMAL /HPF
YEAST BUDDING URNS QL: ABNORMAL /HPF
YEAST HYPHAE URNS QL MICRO: ABNORMAL /HPF

## 2024-11-25 PROCEDURE — 25000003 PHARM REV CODE 250: Performed by: SPECIALIST

## 2024-11-25 PROCEDURE — 36415 COLL VENOUS BLD VENIPUNCTURE: CPT | Performed by: INTERNAL MEDICINE

## 2024-11-25 PROCEDURE — 25000003 PHARM REV CODE 250: Performed by: INTERNAL MEDICINE

## 2024-11-25 PROCEDURE — 11000001 HC ACUTE MED/SURG PRIVATE ROOM

## 2024-11-25 PROCEDURE — 85027 COMPLETE CBC AUTOMATED: CPT | Performed by: INTERNAL MEDICINE

## 2024-11-25 PROCEDURE — 86900 BLOOD TYPING SEROLOGIC ABO: CPT | Performed by: INTERNAL MEDICINE

## 2024-11-25 PROCEDURE — 99900031 HC PATIENT EDUCATION (STAT)

## 2024-11-25 PROCEDURE — 94799 UNLISTED PULMONARY SVC/PX: CPT

## 2024-11-25 PROCEDURE — 99900035 HC TECH TIME PER 15 MIN (STAT)

## 2024-11-25 PROCEDURE — P9016 RBC LEUKOCYTES REDUCED: HCPCS | Performed by: INTERNAL MEDICINE

## 2024-11-25 PROCEDURE — 81001 URINALYSIS AUTO W/SCOPE: CPT | Performed by: INTERNAL MEDICINE

## 2024-11-25 PROCEDURE — 97166 OT EVAL MOD COMPLEX 45 MIN: CPT

## 2024-11-25 PROCEDURE — 36430 TRANSFUSION BLD/BLD COMPNT: CPT

## 2024-11-25 PROCEDURE — 97164 PT RE-EVAL EST PLAN CARE: CPT

## 2024-11-25 PROCEDURE — 63600175 PHARM REV CODE 636 W HCPCS: Performed by: INTERNAL MEDICINE

## 2024-11-25 PROCEDURE — 87086 URINE CULTURE/COLONY COUNT: CPT | Performed by: INTERNAL MEDICINE

## 2024-11-25 PROCEDURE — 86923 COMPATIBILITY TEST ELECTRIC: CPT | Performed by: INTERNAL MEDICINE

## 2024-11-25 PROCEDURE — 80048 BASIC METABOLIC PNL TOTAL CA: CPT | Performed by: INTERNAL MEDICINE

## 2024-11-25 RX ORDER — HYDROCODONE BITARTRATE AND ACETAMINOPHEN 500; 5 MG/1; MG/1
TABLET ORAL
Status: DISCONTINUED | OUTPATIENT
Start: 2024-11-25 | End: 2024-11-29 | Stop reason: HOSPADM

## 2024-11-25 RX ORDER — CEFTRIAXONE 1 G/1
1 INJECTION, POWDER, FOR SOLUTION INTRAMUSCULAR; INTRAVENOUS
Status: DISCONTINUED | OUTPATIENT
Start: 2024-11-25 | End: 2024-11-27

## 2024-11-25 RX ADMIN — DOCUSATE SODIUM 100 MG: 100 CAPSULE, LIQUID FILLED ORAL at 09:11

## 2024-11-25 RX ADMIN — MUPIROCIN: 20 OINTMENT TOPICAL at 09:11

## 2024-11-25 RX ADMIN — ATORVASTATIN CALCIUM 40 MG: 40 TABLET, FILM COATED ORAL at 10:11

## 2024-11-25 RX ADMIN — PREGABALIN 50 MG: 50 CAPSULE ORAL at 09:11

## 2024-11-25 RX ADMIN — FAMOTIDINE 20 MG: 20 TABLET, FILM COATED ORAL at 10:11

## 2024-11-25 RX ADMIN — INSULIN GLARGINE 60 UNITS: 100 INJECTION, SOLUTION SUBCUTANEOUS at 09:11

## 2024-11-25 RX ADMIN — INSULIN ASPART 6 UNITS: 100 INJECTION, SOLUTION INTRAVENOUS; SUBCUTANEOUS at 05:11

## 2024-11-25 RX ADMIN — FERROUS SULFATE TAB 325 MG (65 MG ELEMENTAL FE) 1 EACH: 325 (65 FE) TAB at 10:11

## 2024-11-25 RX ADMIN — CEFTRIAXONE SODIUM 1 G: 1 INJECTION, POWDER, FOR SOLUTION INTRAMUSCULAR; INTRAVENOUS at 01:11

## 2024-11-25 RX ADMIN — HYDROCODONE BITARTRATE AND ACETAMINOPHEN 1 TABLET: 10; 325 TABLET ORAL at 04:11

## 2024-11-25 RX ADMIN — INSULIN ASPART 6 UNITS: 100 INJECTION, SOLUTION INTRAVENOUS; SUBCUTANEOUS at 11:11

## 2024-11-25 RX ADMIN — LEVOTHYROXINE SODIUM 150 MCG: 150 TABLET ORAL at 06:11

## 2024-11-25 RX ADMIN — PREGABALIN 50 MG: 50 CAPSULE ORAL at 10:11

## 2024-11-25 RX ADMIN — TAMSULOSIN HYDROCHLORIDE 0.4 MG: 0.4 CAPSULE ORAL at 10:11

## 2024-11-25 RX ADMIN — HYDROCODONE BITARTRATE AND ACETAMINOPHEN 1 TABLET: 10; 325 TABLET ORAL at 09:11

## 2024-11-25 RX ADMIN — HYDROCODONE BITARTRATE AND ACETAMINOPHEN 1 TABLET: 10; 325 TABLET ORAL at 10:11

## 2024-11-25 RX ADMIN — DOCUSATE SODIUM 100 MG: 100 CAPSULE, LIQUID FILLED ORAL at 10:11

## 2024-11-25 RX ADMIN — PREGABALIN 50 MG: 50 CAPSULE ORAL at 04:11

## 2024-11-25 RX ADMIN — AMIODARONE HYDROCHLORIDE 100 MG: 100 TABLET ORAL at 10:11

## 2024-11-25 NOTE — PT/OT/SLP EVAL
Occupational Therapy  Evaluation    Name: Landon Aragon  MRN: [id number]  Admitting Diagnosis: gross hematuria 2/2 traumatic heath, anemia, cystoscopy with clot evacuation, a-fib,  hx R AKA and L BKA   Recent Surgery: Procedure(s) (LRB):  FULGURATION, BLADDER (N/A)  EVACUATION, HEMATOMA (N/A) 2 Days Post-Op    Recommendations:     Discharge therapy intensity: Moderate Intensity Therapy   Discharge Equipment Recommendations:  to be determined by next level of care  Barriers to discharge:       Assessment:     Landon Aragon is a 74 y.o. male with a medical diagnosis of  gross hematuria 2/2 traumatic heath, anemia, cystoscopy with clot evacuation, a-fib,  hx R AKA and L BKA .  He presents with the following performance deficits affecting function: weakness, impaired endurance, impaired self care skills, impaired functional mobility, gait instability, impaired balance, decreased upper extremity function, decreased coordination.   Pt admitted from [facility], performs scoot t/f normally to power w/c. Today, pt performed lateral scoot at EOB with SBA. Mod A x2 bed mobility. Hx of arthritis in BUE hands, RUE shoulder injury.   Recommend moderate intensity.     Rehab Prognosis: Good; patient would benefit from acute skilled OT services to address these deficits and reach maximum level of function.       Plan:     Patient to be seen 4 x/week to address the above listed problems via self-care/home management, therapeutic activities, therapeutic exercises  Plan of Care Expires: 12/25/24  Plan of Care Reviewed with: patient    Subjective       Occupational Profile:  Living Environment: admitted from [facility]   Previous level of function: scooting with assist to power chair   Equipment Used at Home: power chair, shower chair, slide board  Assistance upon Discharge: [facility]    Pain/Comfort:       Patients cultural, spiritual, Judaism conflicts given the current situation:      Objective:     OT communicated with nrsg prior to session.       Patient was found HOB elevated with   upon OT entry to room.    General Precautions: Standard, fall  Orthopedic Precautions:    Braces:        Bed Mobility:    Patient completed Supine to Sit with moderate assistance  Patient completed Sit to Supine with moderate assistance    Functional Mobility/Transfers:  Lateral scoots with SBA     Activities of Daily Living:  Lower Body Dressing: maximal assistance    Toileting: maximal assistance      AMPAC 6 Click ADL:  AMPAC Total Score: 16    Upper Extremity Function:  Right Upper Extremity:   2/5 arthritis     Left Upper Extremity:  Range of Motion: -3/5 arthritis     Balance:   Static sitting balance: WFL    Therapeutic Positioning  Risk for acquired pressure injuries is increased due to impaired mobility.    OT interventions performed during the course of today's session:   Therapeutic positioning was provided at the conclusion of session to offload all bony prominences for the prevention and/or reduction of pressure injuries    Skin assessment: all bony prominences were assessed    Findings: known area of altered skin integrity at sacrum    OT recommendations for therapeutic positioning throughout hospitalization:   Follow [facility] Pressure Injury Prevention Protocol  Geomat recommended for sacral protection while [facility]  Specialty [employer]      Patient Education:  Patient provided with verbal education education regarding OT role/goals/POC, fall prevention, and safety awareness.  Understanding was verbalized.     Patient left right sidelying with all lines intact, call button in reach, and wedge under L side.    GOALS:   Multidisciplinary Problems       Occupational Therapy Goals          Problem: Occupational Therapy    Goal Priority Disciplines Outcome Interventions   Occupational Therapy Goal     OT, PT/OT Progressing    Description: Goals to be met by: in 1  month      Patient will increase functional independence with ADLs by performing:    LE Dressing with  Supervision.  Toileting from bedside commode with Supervision for hygiene and clothing management.   Toilet transfer to bedside commode with Stand-by Assistance with scoot/slide board.   Increased functional strength to 4/5 for BUE.                         History:     Past Medical History:   Diagnosis Date    Anticoagulant long-term use     BPH (benign prostatic hyperplasia)     CHF (congestive heart failure)     Dependent for wheelchair mobility     Diabetes mellitus     Hard of hearing     High cholesterol     Hypertension     PAD (peripheral artery disease)     Renal disorder     Due to administration of iv Tobramycin. pt went into renal failure.    Requires continuous at home supplemental oxygen     due to CHF. Pt does not have COPD.    Thyroid disease     Wound of right foot          Past Surgical History:   Procedure Laterality Date    AMPUTATION OF RIGHT MIDDLE TOE       APPENDECTOMY      APPLICATION, GRAFT Right 1/13/2023    Procedure: APPLICATION, GRAFT Right [place]askin Graft  Bennett Jean -;  Surgeon: Landon Armas Jr., DPM;  Location: [facility] OR;  Service: Podiatry;  Laterality: Right;    BELLOW THE KNEE AMPUTATION Left     BLADDER FULGURATION N/A 11/23/2024    Procedure: FULGURATION, BLADDER;  Surgeon: Pietro Chavis MD;  Location: [facility];  Service: Urology;  Laterality: N/A;    CATARACT EXTRACTION Bilateral     DEBRIDEMENT OF FOOT Right 1/13/2023    Procedure: DEBRIDEMENT, FOOT Right;  Surgeon: Landon Armas Jr., DPM;  Location: [facility] OR;  Service: Podiatry;  Laterality: Right;    EVACUATION OF HEMATOMA N/A 11/23/2024    Procedure: EVACUATION, HEMATOMA;  Surgeon: Pietro Chavis MD;  Location: [facility] OR;  Service: Urology;  Laterality: N/A;    LEFT FOOT SURGERY       MULTIPLE TIMES PRIOR TO AMPUTATION       Time Tracking:     OT Date of Treatment:    OT Start Time: 1110  OT Stop Time: 1124  OT Total Time (min): 14 min    Billable Minutes:Evaluation Moderate Complexity     11/25/2024

## 2024-11-25 NOTE — NURSING
@530 notified by lab that patient had a critical glucose of 35. Pt responded to verbal stimuli, AAOx4, clear speech. Pt stated I feel ok. Palms noted clammy. Gave pt orange juice orally, suukmar well. Recheck glucose x2.  @0604 glucose 70. Greer Haas, TANIKA Notified.

## 2024-11-25 NOTE — PROGRESS NOTES
[facility] Hospital Medicine Progress Note        Chief Complaint: Inpatient Follow-up for dramatic hematuria post Sharp exchange    HPI per admitting team: 74-year-old male with known past medical history of congestive heart failure, diabetes mellitus type 2, right AKA and left BKA due to diabetic neuropathy, hard of hearing, hyperlipidemia, hypertension, peripheral arterial disease, currently at [facility] admitted 11/21/2024 for hematuria/penile bleeding after Sharp was exchanged yesterday at [facility], concern for traumatic injury.  In the ED patient had Sharp placed and CBI started.  Urology consulted.  Patient has no complaints currently, denies any pain.  Urine in Sharp bag is still red.  Informed patient that urology has been consulted and they will evaluate him soon.  Verbalized understanding  Did informed patient that will be will hold off his aspirin and Eliquis for now and resume other home medication.  Labs reviewed, WBC 15 K, H&H 8.0/25.4, platelets 372, no left shift  PT INR 17.6/1.4  BMP shows sodium of 133, BUN creatinine 46.2/1.8, glucose 311, , AST/ALT within normal range    Interval Hx:   Patient is laying in bed, reports feel much better now that his blood glucose is up.  Could not tell if he did not eat well last night given his blood sugar has always been above 200 since he has been in the hospital    Also discussed with patient that his hemoglobin has dropped again today so will give him 1 more unit packed red blood cell transfusion which will be his 4th unit on this admission, awaiting Urology round   No family is at bedside  Noted reddish urine in Sharp bag  Case was discussed with patient's nurse on the floor.    Objective/physical exam:  General: In no acute distress, afebrile  Chest: Clear to auscultation bilaterally  Heart: RRR, +S1, S2, no appreciable murmur  Abdomen: Soft, nontender, BS +  MSK: Warm, Right AKA, left BKA   : Gross hematuria in  heath bag  Neurologic: Alert and oriented x4, Cranial nerve II-XII intact, able to move all 4 extremities    VITAL SIGNS: 24 HRS MIN & MAX LAST   Temp  Min: 98.1 °F (36.7 °C)  Max: 98.8 °F (37.1 °C) 98.8 °F (37.1 °C)   BP  Min: 112/71  Max: 152/53 (!) 129/52   Pulse  Min: 73  Max: 87  81   Resp  Min: 18  Max: 20 18   SpO2  Min: 96 %  Max: 99 % 98 %     I reviewed the labs below:  Recent Labs   Lab 11/23/24  0453 11/24/24  0558 11/25/24  0434   WBC 12.24* 12.61* 13.26*   RBC 2.75* 3.18* 2.80*   HGB 7.0* 8.5* 7.6*   HCT 23.0* 26.3* 23.3*   MCV 83.6 82.7 83.2   MCH 25.5* 26.7* 27.1   MCHC 30.4* 32.3* 32.6*   RDW 17.0 15.7 16.3    242 205   MPV 10.4 10.6* 10.1     Recent Labs   Lab 11/21/24  1051 11/22/24  0556 11/23/24  0453 11/24/24  0558 11/25/24  0434   * 135* 134* 139 137   K 4.8 4.6 4.1 5.2* 4.2    102 103 103 104   CO2 26 23 26 27 24   BUN 46.2* 39.2* 45.7* 33.4* 29.3*   CREATININE 1.88* 1.31* 1.56* 1.19 0.85   CALCIUM 8.4* 8.5* 8.3* 9.0 8.4*   MG  --  2.10  --   --   --    ALBUMIN 2.5* 2.5*  --   --   --    ALKPHOS 169* 158*  --   --   --    ALT 18 17  --   --   --    AST 15 16  --   --   --    BILITOT 0.5 0.4  --   --   --      Microbiology Results (last 7 days)       ** No results found for the last 168 hours. **           See below for Radiology    Intake and Output:    Intake/Output Summary (Last 24 hours) at 11/25/2024 1144  Last data filed at 11/25/2024 1006  Gross per 24 hour   Intake 2082 ml   Output 71526 ml   Net -23527 ml       Assessment/Plan:  Gross Hematuria secondary to Traumatic Heath exchange at [facility] 11/20 s/p cystoscopy with clot evacuation and fulguration  Acute blood loss anemia due to above, requiring multiple blood transfusion  Paroxysmal Afib on long-term oral anticoagulation- currently on hold since admission  Diabetes mellitus type 2 with hyperglycemia  Hypoglycemia this am  Hypertension, hyperlipidemia  CKD 3B- baseline creatinine  1.4-1.8  Hypothyroidism  Diabetic neuropathy/her prevascular disease/peripheral arterial disease- status post right AKA and left BKA  CAD, history of CHF now recovered to EF 50-55%  Morbid obesity with BMI 50.2  Hyperkalemia- resolved          Admitted as inpatient on 11/21  On CBI  Urology on board, appreciate recommendation, Sharp exchanged and CBI infusing at high rate  11/23- cystoscopy with clot evacuation and fulguration  Patient has received total of 3 units packed red blood cells since admission, hemoglobin now 8.5--> 7.6  Ordered 1 more unit PRRBC transfusion (that will be his 4th unit on this admission)  Aspirin and Eliquis on hold since admission  Noted leukocytosis 13 K, now 14 K, will empirically started on ceftriaxone given patient underwent urologic intervention.  Day 1 of 3  Also ordered urinalysis  On insulin sliding scale medium and increase Lantus to 60 units b.i.d. (takes 75 unit bid at home)  Hypoglycemia this morning with glucose of 35, received aggressive glucose replacement, around lunchtime blood glucose again up to 251.  Will monitor closely  A1C 7.5  Creatinine 1.1, baseline CKD 3B  Potassium normalized to 4.2  Appropriate home medication for chronic medical condition has been resumed  Consult PT OT as patient coming from [facility]- recommending moderate therapy, patient to return to [facility] once medically ready  Diabetic cardiac diet postprocedure  Insulin sliding scale medium AC HS  Antihypertensives on hold given softer BP  Morning CBC, BMP ordered     VTE Prophylaxis:  SCDs, hold off chemical prophylaxis given gross hematuria, resume Eliquis/aspirin once cleared by Urology     Patient condition:  fair     Discharge Planning and Disposition/Anticipated discharge:  Return to [facility] once medically cleared by Urology    Critical care note:  Critical care diagnosis:  Acute Blood loss anemia requiring blood transfusion  Critical care interventions: Hands-on evaluation, review of  labs/radiographs/records and discussion with patient and family if present  Critical care time spent: 35 minutes        All diagnosis and differential diagnosis have been reviewed; assessment and plan has been documented; I have personally reviewed the labs and test results that are presently available; I have reviewed the patients medication list; I have reviewed the consulting providers response and recommendations. I have reviewed or attempted to review medical records based upon their availability    All of the patient's questions have been  addressed and answered. Patient's is agreeable to the above stated plan. I will continue to monitor closely and make adjustments to medical management as needed.    Portions of this note dictated using EMR integrated voice recognition software, and may be subject to voice recognition errors not corrected at proofreading. Please contact writer for clarification if needed.   _____________________________________________________________________    Nutrition Status:  Nutrition consulted. Most recent weight and BMI monitored-     Measurements:  Wt Readings from Last 1 Encounters:   11/22/24 (!) 158.8 kg (350 lb 1.5 oz)   Body mass index is 50.23 kg/m².    Patient has been screened and assessed by RD.    Malnutrition Type:  Context:    Level:      Malnutrition Characteristic Summary:       Interventions/Recommendations (treatment strategy):         A minimum of two characteristics is recommended for diagnosis of either severe or non-severe malnutrition.     Current Med:   amiodarone  100 mg Oral Daily    atorvastatin  40 mg Oral Daily    cefTRIAXone (Rocephin) IV (PEDS and ADULTS)  1 g Intravenous Q24H    docusate sodium  100 mg Oral BID    famotidine  20 mg Oral Daily    ferrous sulfate  1 tablet Oral Daily    fluticasone propionate  2 spray Each Nostril Daily    insulin glargine U-100 (Lantus)  60 Units Subcutaneous BID    levothyroxine  150 mcg Oral Before breakfast     mupirocin   Nasal BID    pregabalin  50 mg Oral TID    tamsulosin  0.4 mg Oral Daily      PRN meds:  Current Facility-Administered Medications:     0.9%  NaCl infusion (for blood administration), , Intravenous, Q24H PRN    acetaminophen, 1,000 mg, Oral, Q6H PRN    acetaminophen, 650 mg, Oral, Q4H PRN    dextrose 10%, 12.5 g, Intravenous, PRN    dextrose 10%, 25 g, Intravenous, PRN    glucagon (human recombinant), 1 mg, Intramuscular, PRN    glucose, 16 g, Oral, PRN    glucose, 24 g, Oral, PRN    HYDROcodone-acetaminophen, 1 tablet, Oral, Q4H PRN    HYDROmorphone, 1 mg, Intravenous, Q2H PRN    hyoscyamine, 0.125 mg, Sublingual, Q4H PRN    influenza (adjuvanted), 0.5 mL, Intramuscular, vaccine x 1 dose    insulin aspart U-100, 0-10 Units, Subcutaneous, QID (AC + HS) PRN    naloxone, 0.02 mg, Intravenous, PRN    ondansetron, 4 mg, Intravenous, Q4H PRN    pneumoc 20-wander conj-dip cr(PF), 0.5 mL, Intramuscular, vaccine x 1 dose    prochlorperazine, 5 mg, Intravenous, Q6H PRN    sodium chloride 0.9%, 10 mL, Intravenous, PRN    Continuous infusion:   lactated ringers   Intravenous Continuous 100 mL/hr at 11/24/24 1816 New Bag at 11/24/24 1816        Radiology:   I have personally reviewed the images and agree with radiologist report  X-Ray Chest PA Lateral With Lordotic Vie  Indication: Preoperative respiratory evaluation     Findings: No prior studies are available for comparison. Heart size is  normal and lungs are clear bilaterally. Pulmonary vasculature is  normal.     IMPRESSION: No evidence of acute disease.        Electronically Signed By: Boone Burdick MD  Date/Time Signed: 08/13/2018 16:44        Shiv Faust MD  Department of Hospital Medicine   [facility]   11/25/2024

## 2024-11-25 NOTE — PROGRESS NOTES
UROLOGY  PROGRESS  NOTE    Landon Aragon 1950  [id number]  11/25/2024    POD # 2 s/p cystoscopy with clot evacuation and fulguration    Patient working with therapy   Sitting at bedside   No significant Sharp discomfort   No acute events overnight  Plans for blood transfusion today  CBI rate recently increased by nursing due to some hematuria following activity    VSS, afebrile   WBC 13.26   H&H 7.6/23.3   BUN and creatinine 29.3/0.5    Intake/Output:  I/O this shift:  In: -   Out: 1900 [Urine:1900]  I/O last 3 completed shifts:  In: 2562 [P.O.:2562]  Out: 59342 [Urine:84023]     Exam:    NAD  Card: RRR  Resp: unlabored  Abd:  Soft, NT ND  :  Dark pink urine without clot draining to  bag, CBI in progress at moderate rate  Extremity:  Bilateral lower extremity amputations    Recent Results (from the past 24 hours)   POCT glucose    Collection Time: 11/24/24 10:45 AM   Result Value Ref Range    POCT Glucose 262 (H) 70 - 110 mg/dL   POCT glucose    Collection Time: 11/24/24  4:37 PM   Result Value Ref Range    POCT Glucose 222 (H) 70 - 110 mg/dL   POCT glucose    Collection Time: 11/24/24  8:31 PM   Result Value Ref Range    POCT Glucose 277 (H) 70 - 110 mg/dL   CBC Without Differential    Collection Time: 11/25/24  4:34 AM   Result Value Ref Range    WBC 13.26 (H) 4.50 - 11.50 x10(3)/mcL    RBC 2.80 (L) 4.70 - 6.10 x10(6)/mcL    Hgb 7.6 (L) 14.0 - 18.0 g/dL    Hct 23.3 (L) 42.0 - 52.0 %    MCV 83.2 80.0 - 94.0 fL    MCH 27.1 27.0 - 31.0 pg    MCHC 32.6 (L) 33.0 - 36.0 g/dL    RDW 16.3 11.5 - 17.0 %    Platelet 205 130 - 400 x10(3)/mcL    MPV 10.1 7.4 - 10.4 fL    NRBC% 0.0 %   Basic Metabolic Panel    Collection Time: 11/25/24  4:34 AM   Result Value Ref Range    Sodium 137 136 - 145 mmol/L    Potassium 4.2 3.5 - 5.1 mmol/L    Chloride 104 98 - 107 mmol/L    CO2 24 23 - 31 mmol/L    Glucose 35 (LL) 82 - 115 mg/dL    Blood Urea Nitrogen 29.3 (H) 8.4 - 25.7 mg/dL    Creatinine 0.85 0.72 - 1.25 mg/dL     BUN/Creatinine Ratio 34     Calcium 8.4 (L) 8.8 - 10.0 mg/dL    Anion Gap 9.0 mEq/L    eGFR >60 mL/min/1.73/m2   POCT glucose    Collection Time: 11/25/24  5:34 AM   Result Value Ref Range    POCT Glucose 28 (LL) 70 - 110 mg/dL   POCT glucose    Collection Time: 11/25/24  5:46 AM   Result Value Ref Range    POCT Glucose 69 (L) 70 - 110 mg/dL   POCT glucose    Collection Time: 11/25/24  5:52 AM   Result Value Ref Range    POCT Glucose 43 (LL) 70 - 110 mg/dL   POCT glucose    Collection Time: 11/25/24  6:04 AM   Result Value Ref Range    POCT Glucose 70 70 - 110 mg/dL   POCT glucose    Collection Time: 11/25/24  6:30 AM   Result Value Ref Range    POCT Glucose 118 (H) 70 - 110 mg/dL   Prepare RBC 1 Unit    Collection Time: 11/25/24  7:35 AM   Result Value Ref Range    UNIT NUMBER [id number]     UNIT ABO/RH O POS     DISPENSE STATUS Issued     Unit Expiration [id number]     Product Code [id number]     Unit Blood Type Code 5100     CROSSMATCH INTERPRETATION Compatible    Type & Screen    Collection Time: 11/25/24  7:35 AM   Result Value Ref Range    Group & Rh O POS     Indirect Chava GEL NEG     Specimen Outdate 11/28/2024 23:59        Assessment:  Gross hematuria status post recent cystoscopy with clot evacuation fulguration 10/08/2024  -began after heath exchange prior to admission, likely heath trauma with blood thinners  -status post cystoscopy with clot evacuation and fulguration of bladder and prostate 11/23/2024  -h/o BPH    Anemia   -plans for additional transfusion today    Plan:  -continue CBI, titrate to maintain relatively clear urine.  Hand irrigate as needed for clots.  -hopefully we will be able to wean CBI over the next few days.  -discussed with the patient and nursing   -following      Loreta Schreiber NP

## 2024-11-25 NOTE — PLAN OF CARE
Problem: Physical Therapy  Goal: Physical Therapy Goal  Description: Goals to be met by: 24     Patient will increase functional independence with mobility by performin. Supine to sit with Set-up [place]  2. Sit to supine with Set-up [place]  3. Rolling to Left and Right with [place].  4. Bed to chair transfer with Stand-by Assistance using LRAD  5. Wheelchair propulsion x200 feet with [place] using bilateral uppper extremities    Outcome: Progressing

## 2024-11-25 NOTE — PT/OT/SLP RE-EVAL
Physical Therapy Re-Evaluation    Patient Name:  Landon Aragon   MRN:  [id number]    Recommendations:     Discharge therapy intensity: Moderate Intensity Therapy   Discharge Equipment Recommendations:  (TBD by next level of care)   Barriers to discharge: Ongoing medical needs    Assessment:     Landon Aragon is a 74 y.o. male admitted with a medical diagnosis of penile bleeding.  He presents with the following impairments/functional limitations: impaired endurance, weakness, impaired self care skills, impaired functional mobility, impaired balance, gait instability, decreased lower extremity function, decreased safety awareness, pain. S/p cystoscopy with clot evacuation and fulguration of prostate and trigone on 11/23. Patient tolerated reeval well. MIN A - MOD A for all mobility. Plan to attempt transfers to/from  in the next few sessions. Progress as tolerated.     Rehab Prognosis: Good; patient would benefit from acute skilled PT services to address these deficits and reach maximum level of function.    Recent Surgery: Procedure(s) (LRB):  FULGURATION, BLADDER (N/A)  EVACUATION, HEMATOMA (N/A) 2 Days Post-Op    Plan:     During this hospitalization, patient would benefit from acute PT services 5 x/week to address the identified rehab impairments via therapeutic activities, therapeutic exercises, neuromuscular re-education, wheelchair management/training and progress toward the following goals:    Plan of Care Expires:  12/22/24      Subjective     Chief Complaint: none  Patient/Family Comments/goals: none  Pain/Comfort:  Pain Rating 1: 0/10    Patients cultural, spiritual, Anabaptism conflicts given the current situation: no    Objective:     Communicated with nurse prior to session.  Patient found supine with telemetry, heath catheter, peripheral IV  upon PT entry to room.    General Precautions: Standard, fall  Orthopedic Precautions:N/A   Braces: N/A  Respiratory Status: Room air    Exams:  Cognitive Exam:   Patient is oriented to Person, Place, Time, and Situation  Sensation: -       Intact  RLE ROM: WFL  RLE Strength: 4/5 grossly  LLE ROM: WFL  LLE Strength: 4/5 grossly  Skin integrity: Visible skin intact      Functional Mobility:  Bed Mobility:  Rolling Left:  minimum assistance  Rolling Right: stand by assistance  Scooting: stand by assistance  Supine to Sit: moderate assistance  Sit to Supine: moderate assistance  Balance: fair      AM-PAC 6 CLICK MOBILITY  Total Score:10     Education Provided:  Role and goals of PT, transfer training, bed mobility, balance training, safety awareness, assistive device, strengthening exercises, and importance of participating in PT to return to PLOF.    Patient left right sidelying with all lines intact and call button in reach.    GOALS:   Multidisciplinary Problems       Physical Therapy Goals          Problem: Physical Therapy    Goal Priority Disciplines Outcome Interventions   Physical Therapy Goal     PT, PT/OT Progressing    Description: Goals to be met by: 24     Patient will increase functional independence with mobility by performin. Supine to sit with Set-up [place]  2. Sit to supine with Set-up [place]  3. Rolling to Left and Right with [place].  4. Bed to chair transfer with Stand-by Assistance using LRAD  5. Wheelchair propulsion x200 feet with [place] using bilateral uppper extremities                         History:     Past Medical History:   Diagnosis Date    Anticoagulant long-term use     BPH (benign prostatic hyperplasia)     CHF (congestive heart failure)     Dependent for wheelchair mobility     Diabetes mellitus     Hard of hearing     High cholesterol     Hypertension     PAD (peripheral artery disease)     Renal disorder     Due to administration of iv Tobramycin. pt went into renal failure.    Requires continuous at home supplemental oxygen     due to CHF. Pt does not have COPD.    Thyroid disease     Wound of right foot         Past Surgical History:   Procedure Laterality Date    AMPUTATION OF RIGHT MIDDLE TOE       APPENDECTOMY      APPLICATION, GRAFT Right 1/13/2023    Procedure: APPLICATION, GRAFT Right [place] Graft  Bennett Jean -;  Surgeon: Landon Armas Jr., DPM;  Location: [facility] OR;  Service: Podiatry;  Laterality: Right;    BELLOW THE KNEE AMPUTATION Left     BLADDER FULGURATION N/A 11/23/2024    Procedure: FULGURATION, BLADDER;  Surgeon: Pietro Chavis MD;  Location: [facility] OR;  Service: Urology;  Laterality: N/A;    CATARACT EXTRACTION Bilateral     DEBRIDEMENT OF FOOT Right 1/13/2023    Procedure: DEBRIDEMENT, FOOT Right;  Surgeon: Landon Armas Jr., DPM;  Location: [facility] OR;  Service: Podiatry;  Laterality: Right;    EVACUATION OF HEMATOMA N/A 11/23/2024    Procedure: EVACUATION, HEMATOMA;  Surgeon: Pietro Chavis MD;  Location: [facility];  Service: Urology;  Laterality: N/A;    LEFT FOOT SURGERY       MULTIPLE TIMES PRIOR TO AMPUTATION       Time Tracking:     PT Received On: 11/25/24  PT Start Time: 1104     PT Stop Time: 1124  PT Total Time (min): 20 min     Billable Minutes: Re-eval 20 minutes      11/25/2024

## 2024-11-25 NOTE — PLAN OF CARE
Problem: Occupational Therapy  Goal: Occupational Therapy Goal  Description: Goals to be met by: in 1  month      Patient will increase functional independence with ADLs by performing:    LE Dressing with Supervision.  Toileting from bedside commode with Supervision for hygiene and clothing management.   Toilet transfer to bedside commode with Stand-by Assistance with scoot/slide board.   Increased functional strength to 4/5 for BUE.    Outcome: Progressing

## 2024-11-26 LAB
ANION GAP SERPL CALC-SCNC: 10 MEQ/L
BUN SERPL-MCNC: 18.3 MG/DL (ref 8.4–25.7)
CALCIUM SERPL-MCNC: 8.2 MG/DL (ref 8.8–10)
CHLORIDE SERPL-SCNC: 103 MMOL/L (ref 98–107)
CO2 SERPL-SCNC: 26 MMOL/L (ref 23–31)
CREAT SERPL-MCNC: 0.83 MG/DL (ref 0.72–1.25)
CREAT/UREA NIT SERPL: 22
ERYTHROCYTE [DISTWIDTH] IN BLOOD BY AUTOMATED COUNT: 16 % (ref 11.5–17)
GFR SERPLBLD CREATININE-BSD FMLA CKD-EPI: >60 ML/MIN/1.73/M2
GLUCOSE SERPL-MCNC: 65 MG/DL (ref 82–115)
HCT VFR BLD AUTO: 26.2 % (ref 42–52)
HGB BLD-MCNC: 8.4 G/DL (ref 14–18)
MCH RBC QN AUTO: 27 PG (ref 27–31)
MCHC RBC AUTO-ENTMCNC: 32.1 G/DL (ref 33–36)
MCV RBC AUTO: 84.2 FL (ref 80–94)
NRBC BLD AUTO-RTO: 0 %
PLATELET # BLD AUTO: 193 X10(3)/MCL (ref 130–400)
PMV BLD AUTO: 10.7 FL (ref 7.4–10.4)
POCT GLUCOSE: 216 MG/DL (ref 70–110)
POCT GLUCOSE: 231 MG/DL (ref 70–110)
POCT GLUCOSE: 259 MG/DL (ref 70–110)
POCT GLUCOSE: 272 MG/DL (ref 70–110)
POCT GLUCOSE: 53 MG/DL (ref 70–110)
POCT GLUCOSE: 60 MG/DL (ref 70–110)
POCT GLUCOSE: 74 MG/DL (ref 70–110)
POTASSIUM SERPL-SCNC: 4.1 MMOL/L (ref 3.5–5.1)
RBC # BLD AUTO: 3.11 X10(6)/MCL (ref 4.7–6.1)
SODIUM SERPL-SCNC: 139 MMOL/L (ref 136–145)
WBC # BLD AUTO: 8.32 X10(3)/MCL (ref 4.5–11.5)

## 2024-11-26 PROCEDURE — 27000221 HC OXYGEN, UP TO 24 HOURS

## 2024-11-26 PROCEDURE — 97530 THERAPEUTIC ACTIVITIES: CPT

## 2024-11-26 PROCEDURE — 99900031 HC PATIENT EDUCATION (STAT)

## 2024-11-26 PROCEDURE — 63600175 PHARM REV CODE 636 W HCPCS: Performed by: NURSE PRACTITIONER

## 2024-11-26 PROCEDURE — 94799 UNLISTED PULMONARY SVC/PX: CPT

## 2024-11-26 PROCEDURE — 63600175 PHARM REV CODE 636 W HCPCS: Performed by: INTERNAL MEDICINE

## 2024-11-26 PROCEDURE — 85027 COMPLETE CBC AUTOMATED: CPT | Performed by: INTERNAL MEDICINE

## 2024-11-26 PROCEDURE — 25000003 PHARM REV CODE 250: Performed by: INTERNAL MEDICINE

## 2024-11-26 PROCEDURE — 11000001 HC ACUTE MED/SURG PRIVATE ROOM

## 2024-11-26 PROCEDURE — 36415 COLL VENOUS BLD VENIPUNCTURE: CPT | Performed by: INTERNAL MEDICINE

## 2024-11-26 PROCEDURE — 99900035 HC TECH TIME PER 15 MIN (STAT)

## 2024-11-26 PROCEDURE — 97110 THERAPEUTIC EXERCISES: CPT | Mod: CO

## 2024-11-26 PROCEDURE — 80048 BASIC METABOLIC PNL TOTAL CA: CPT | Performed by: INTERNAL MEDICINE

## 2024-11-26 PROCEDURE — 25000003 PHARM REV CODE 250: Performed by: SPECIALIST

## 2024-11-26 PROCEDURE — 97535 SELF CARE MNGMENT TRAINING: CPT | Mod: CO

## 2024-11-26 RX ORDER — INSULIN GLARGINE 100 [IU]/ML
40 INJECTION, SOLUTION SUBCUTANEOUS 2 TIMES DAILY
Status: DISCONTINUED | OUTPATIENT
Start: 2024-11-26 | End: 2024-11-27

## 2024-11-26 RX ADMIN — FLUTICASONE PROPIONATE 100 MCG: 50 SPRAY, METERED NASAL at 09:11

## 2024-11-26 RX ADMIN — HYDROCODONE BITARTRATE AND ACETAMINOPHEN 1 TABLET: 10; 325 TABLET ORAL at 08:11

## 2024-11-26 RX ADMIN — PREGABALIN 50 MG: 50 CAPSULE ORAL at 08:11

## 2024-11-26 RX ADMIN — WHITE PETROLATUM: 1.75 OINTMENT TOPICAL at 08:11

## 2024-11-26 RX ADMIN — HYDROCODONE BITARTRATE AND ACETAMINOPHEN 1 TABLET: 10; 325 TABLET ORAL at 09:11

## 2024-11-26 RX ADMIN — MUPIROCIN: 20 OINTMENT TOPICAL at 08:11

## 2024-11-26 RX ADMIN — CEFTRIAXONE SODIUM 1 G: 1 INJECTION, POWDER, FOR SOLUTION INTRAMUSCULAR; INTRAVENOUS at 12:11

## 2024-11-26 RX ADMIN — HYDROCODONE BITARTRATE AND ACETAMINOPHEN 1 TABLET: 10; 325 TABLET ORAL at 02:11

## 2024-11-26 RX ADMIN — DOCUSATE SODIUM 100 MG: 100 CAPSULE, LIQUID FILLED ORAL at 08:11

## 2024-11-26 RX ADMIN — INSULIN GLARGINE 40 UNITS: 100 INJECTION, SOLUTION SUBCUTANEOUS at 08:11

## 2024-11-26 RX ADMIN — TAMSULOSIN HYDROCHLORIDE 0.4 MG: 0.4 CAPSULE ORAL at 08:11

## 2024-11-26 RX ADMIN — PREGABALIN 50 MG: 50 CAPSULE ORAL at 02:11

## 2024-11-26 RX ADMIN — FERROUS SULFATE TAB 325 MG (65 MG ELEMENTAL FE) 1 EACH: 325 (65 FE) TAB at 08:11

## 2024-11-26 RX ADMIN — INSULIN ASPART 6 UNITS: 100 INJECTION, SOLUTION INTRAVENOUS; SUBCUTANEOUS at 12:11

## 2024-11-26 RX ADMIN — SODIUM CHLORIDE, POTASSIUM CHLORIDE, SODIUM LACTATE AND CALCIUM CHLORIDE: 600; 310; 30; 20 INJECTION, SOLUTION INTRAVENOUS at 05:11

## 2024-11-26 RX ADMIN — LEVOTHYROXINE SODIUM 150 MCG: 150 TABLET ORAL at 05:11

## 2024-11-26 RX ADMIN — AMIODARONE HYDROCHLORIDE 100 MG: 100 TABLET ORAL at 08:11

## 2024-11-26 RX ADMIN — SODIUM CHLORIDE, POTASSIUM CHLORIDE, SODIUM LACTATE AND CALCIUM CHLORIDE: 600; 310; 30; 20 INJECTION, SOLUTION INTRAVENOUS at 03:11

## 2024-11-26 RX ADMIN — INSULIN GLARGINE 40 UNITS: 100 INJECTION, SOLUTION SUBCUTANEOUS at 09:11

## 2024-11-26 RX ADMIN — ATORVASTATIN CALCIUM 40 MG: 40 TABLET, FILM COATED ORAL at 08:11

## 2024-11-26 RX ADMIN — FAMOTIDINE 20 MG: 20 TABLET, FILM COATED ORAL at 08:11

## 2024-11-26 RX ADMIN — INSULIN ASPART 4 UNITS: 100 INJECTION, SOLUTION INTRAVENOUS; SUBCUTANEOUS at 05:11

## 2024-11-26 NOTE — CONSULTS
[facility] - 8th Floor Med Surg  Wound Care    Patient Name:  Landon Aragon   MRN:  [id number]  Date: 11/26/2024  Diagnosis: <principal problem not specified>    History:     Past Medical History:   Diagnosis Date    Anticoagulant long-term use     BPH (benign prostatic hyperplasia)     CHF (congestive heart failure)     Dependent for wheelchair mobility     Diabetes mellitus     Hard of hearing     High cholesterol     Hypertension     PAD (peripheral artery disease)     Renal disorder     Due to administration of iv Tobramycin. pt went into renal failure.    Requires continuous at home supplemental oxygen     due to CHF. Pt does not have COPD.    Thyroid disease     Wound of right foot        Social History     Socioeconomic History    Marital status: Single   Tobacco Use    Smoking status: Never    Smokeless tobacco: Current     Types: Chew   Substance and Sexual Activity    Alcohol use: Not Currently    Drug use: Not Currently    Sexual activity: Not Currently     Social Drivers of Health     Financial Resource Strain: Low Risk  (11/22/2024)    Overall Financial Resource Strain (CARDIA)     Difficulty of Paying Living Expenses: Not hard at all   Food Insecurity: No Food Insecurity (11/22/2024)    Hunger Vital Sign     Worried About Running Out of Food in the Last Year: Never true     Ran Out of Food in the Last Year: Never true   Transportation Needs: No Transportation Needs (11/22/2024)    TRANSPORTATION NEEDS     Transportation : No   Physical Activity: Inactive (11/22/2024)    Exercise Vital Sign     Days of Exercise per Week: 0 days     Minutes of Exercise per Session: 0 min   Stress: Patient Unable To Answer (11/22/2024)    [employer] [place] [employer] - Occupational Stress Questionnaire     Feeling of Stress : Patient unable to answer   Housing Stability: Low Risk  (11/22/2024)    Housing Stability Vital Sign     Unable to Pay for Housing in the Last Year: No     Homeless in the  Last Year: No       Precautions:     Allergies as of 11/21/2024 - Reviewed 11/21/2024   Allergen Reaction Noted    Tobramycin Shortness Of Breath 01/08/2023       [facility] Assessment Details/Treatment        11/26/24 1217   WO Assessment   Visit Date 11/26/24   Visit Time 1217   Consult Type New   [facility] Speciality Wound   Intervention chart review;assessed;applied;orders   Teaching on-going        Wound 11/21/24 1030 Pressure Injury Left medial Buttocks   Date First Assessed/Time First Assessed: 11/21/24 1030   Present on Original Admission: Yes  Primary Wound Type: Pressure Injury  Side: Left  Orientation: medial  Location: Buttocks   Wound Image    Dressing Appearance Moist drainage   Drainage Amount Small   Drainage Characteristics/Odor Creamy;Serosanguineous   Appearance Pink;Red;Yellow   Tissue loss description Full thickness   Black (%), Wound Tissue Color 0 %   Red (%), Wound Tissue Color 70 %   Yellow (%), Wound Tissue Color 30 %   Periwound Area Pink;Pale white;Scar tissue   Wound Edges Irregular   Wound Length (cm) 6 cm   Wound Width (cm) 7.5 cm   Wound Depth (cm) 4 cm   Wound Volume (cm^3) 180 cm^3   Wound Surface Area (cm^2) 45 cm^2   Undermining (depth (cm)/location) um 11-2 oclock, deepest at 2pm @4.3cm   Care Cleansed with:;Wound cleanser  (vash)   Dressing Changed;Other (comment)  (packed with vashe moistened mesalt, vashe wet to dry, abd pad, tape)        Wound 11/21/24 1810 Other (comment) medial Upper quadrant   Date First Assessed/Time First Assessed: 11/21/24 1810   Primary Wound Type: Other (comment)  Orientation: medial  Location: Upper quadrant   Wound Image    Dressing Appearance Intact;Dried drainage   Drainage Amount Scant   Drainage Characteristics/Odor Serosanguineous   Appearance Pink;Red   Tissue loss description Partial thickness   Black (%), Wound Tissue Color 0 %   Red (%), Wound Tissue Color 100 %   Yellow (%), Wound Tissue Color 0 %   Periwound Area Dry   Wound Edges Irregular   Wound  Length (cm) 4 cm   Wound Width (cm) 3 cm   Wound Depth (cm) 0.1 cm   Wound Volume (cm^3) 1.2 cm^3   Wound Surface Area (cm^2) 12 cm^2   Care Cleansed with:;Wound cleanser   Dressing Applied;Calcium alginate;Silver;Absorptive Pad  ((just to opened area))     WOCN consulted for buttocks and abdomen. Discussed POC w/ nurse Rao who was at bedside. No family at bedside. Pt. Stated he's had the dry skin on his abdomen for years. Explained reason for visit. Continue treatment recs for buttocks. Treatment recs: abdomen: Clean w/ hibiclens, dry well, apply aquaphor to dried skin. BID/Prn if needed. Nursing to cont. Tx recs and preventative measures. Will follow up.     11/26/2024

## 2024-11-26 NOTE — PROGRESS NOTES
[facility] Hospital Medicine Progress Note        Chief Complaint: Inpatient Follow-up for dramatic hematuria post Heath exchange    HPI per admitting team: 74-year-old male with known past medical history of congestive heart failure, diabetes mellitus type 2, right AKA and left BKA due to diabetic neuropathy, hard of hearing, hyperlipidemia, hypertension, peripheral arterial disease, currently at [facility] admitted 11/21/2024 for hematuria/penile bleeding after Heath was exchanged yesterday at , concern for traumatic injury.  In the ED patient had Heath placed and CBI started.  Urology consulted.  Patient has no complaints currently, denies any pain.  Urine in Heath bag is still red.  Informed patient that urology has been consulted and they will evaluate him soon.  Verbalized understanding  Did informed patient that will be will hold off his aspirin and Eliquis for now and resume other home medication.  Labs reviewed, WBC 15 K, H&H 8.0/25.4, platelets 372, no left shift  PT INR 17.6/1.4  BMP shows sodium of 133, BUN creatinine 46.2/1.8, glucose 311, , AST/ALT within normal range    Interval Hx:   Patient is  sitting in bed, no complaints.  Informed that his hemoglobin is stable as of this morning and looks like urine is also clearing out.  Nurse reported lesion on the abdomen, evaluated, hyperkeratosis noted, reported has been there for years, has seen primary care as well as dermatologist with no relief   No family is at bedside  Noted dark urine in bad, no gross blood   Case was discussed with patient's nurse and  on the floor.    Objective/physical exam:  General: In no acute distress, afebrile  Chest: Clear to auscultation bilaterally  Heart: RRR, +S1, S2, no appreciable murmur  Abdomen: Soft, nontender, BS +, hyperkeratotic skin abdomen with dressing   MSK: Warm, Right AKA, left BKA   : Gross hematuria in heath bag  Neurologic: Alert and oriented  x4, Cranial nerve II-XII intact, able to move all 4 extremities    VITAL SIGNS: 24 HRS MIN & MAX LAST   Temp  Min: 98.2 °F (36.8 °C)  Max: 99.5 °F (37.5 °C) 99.5 °F (37.5 °C)   BP  Min: 112/58  Max: 176/62 (!) 112/58   Pulse  Min: 67  Max: 82  67   Resp  Min: 17  Max: 20 18   SpO2  Min: 97 %  Max: 100 % 97 %     I reviewed the labs below:  Recent Labs   Lab 11/24/24  0558 11/25/24  0434 11/26/24 0453   WBC 12.61* 13.26* 8.32   RBC 3.18* 2.80* 3.11*   HGB 8.5* 7.6* 8.4*   HCT 26.3* 23.3* 26.2*   MCV 82.7 83.2 84.2   MCH 26.7* 27.1 27.0   MCHC 32.3* 32.6* 32.1*   RDW 15.7 16.3 16.0    205 193   MPV 10.6* 10.1 10.7*     Recent Labs   Lab 11/21/24  1051 11/22/24  0556 11/23/24  0453 11/24/24  0558 11/25/24  0434 11/26/24 0453   * 135*   < > 139 137 139   K 4.8 4.6   < > 5.2* 4.2 4.1    102   < > 103 104 103   CO2 26 23   < > 27 24 26   BUN 46.2* 39.2*   < > 33.4* 29.3* 18.3   CREATININE 1.88* 1.31*   < > 1.19 0.85 0.83   CALCIUM 8.4* 8.5*   < > 9.0 8.4* 8.2*   MG  --  2.10  --   --   --   --    ALBUMIN 2.5* 2.5*  --   --   --   --    ALKPHOS 169* 158*  --   --   --   --    ALT 18 17  --   --   --   --    AST 15 16  --   --   --   --    BILITOT 0.5 0.4  --   --   --   --     < > = values in this interval not displayed.     Microbiology Results (last 7 days)       Procedure Component Value Units Date/Time    Urine culture [[id number]]  (Abnormal) Collected: 11/25/24 1242    Order Status: Completed Specimen: Urine Updated: 11/26/24 075     Urine Culture >/= 100,000 colonies/ml Gram-negative Rods           See below for Radiology    Intake and Output:    Intake/Output Summary (Last 24 hours) at 11/26/2024 1211  Last data filed at 11/26/2024 0500  Gross per 24 hour   Intake 2265.25 ml   Output 2000 ml   Net 265.25 ml       Assessment/Plan:  Gross Hematuria secondary to Traumatic Sharp exchange at [facility] 11/20 s/p cystoscopy with clot evacuation and fulguration  Acute blood loss anemia due to  above, requiring multiple blood transfusion  Paroxysmal Afib on long-term oral anticoagulation- currently on hold since admission  Diabetes mellitus type 2 with hyperglycemia  Hypoglycemia this am  Hypertension, hyperlipidemia  CKD 3B- baseline creatinine 1.4-1.8  Hypothyroidism  Diabetic neuropathy/her prevascular disease/peripheral arterial disease- status post right AKA and left BKA  CAD, history of CHF now recovered to EF 50-55%  Morbid obesity with BMI 50.2  Hyperkalemia- resolved  Hyperkeratosis abdominal wall  Sacral decubitus- stage III full-thickness pressure wound left medial [place], stage II partial thickness wound on right buttock- POA          Admitted as inpatient on 11/21  On CBI  Urology on board, appreciate recommendation, Sharp exchanged and CBI infusing at high rate  11/23- underwent cystoscopy with clot evacuation and fulguration  Patient has received total of 4 units packed red blood cells since admission, hemoglobin now 8.4  Aspirin and Eliquis on hold since admission, resume once cleared by Urology  Leukocytosis resolved now 8k  Urine culture greater than 100,000 CFU per mL GNR, identification and susceptibility pending  Continue ceftriaxone 1 g daily- Day 2 of 3  On insulin sliding scale medium and decreased Lantus to 40 units b.i.d. (takes 75 unit bid at home)  Hypoglycemia again this morning with glucose of 65, after breakfast again went up to 259  No juices, crackers, Kenny crackers, grapes, cantaloupe, pineapple on my patient's tray  A1C 7.5  Creatinine 1.1, baseline CKD 3B  Potassium normalized to 4.2  Appropriate home medication for chronic medical condition has been resumed  Consult PT OT as patient coming from [facility]- recommending moderate therapy, patient to return to [facility] once medically ready  Diabetic cardiac diet postprocedure  Insulin sliding scale medium AC HS  Antihypertensives on hold given softer BP  Wound care on board for sacral decubitus and abdominal wall  hyperkeratosis   Morning CBC ordered      VTE Prophylaxis:  SCDs, hold off chemical prophylaxis given gross hematuria, resume Eliquis/aspirin once cleared by Urology     Patient condition:  fair     Discharge Planning and Disposition/Anticipated discharge:  Return to [facility] probably friday        All diagnosis and differential diagnosis have been reviewed; assessment and plan has been documented; I have personally reviewed the labs and test results that are presently available; I have reviewed the patients medication list; I have reviewed the consulting providers response and recommendations. I have reviewed or attempted to review medical records based upon their availability    All of the patient's questions have been  addressed and answered. Patient's is agreeable to the above stated plan. I will continue to monitor closely and make adjustments to medical management as needed.    Portions of this note dictated using EMR integrated voice recognition software, and may be subject to voice recognition errors not corrected at proofreading. Please contact writer for clarification if needed.   _____________________________________________________________________    Nutrition Status:  Nutrition consulted. Most recent weight and BMI monitored-     Measurements:  Wt Readings from Last 1 Encounters:   11/22/24 (!) 158.8 kg (350 lb 1.5 oz)   Body mass index is 50.23 kg/m².    Patient has been screened and assessed by RD.    Malnutrition Type:  Context:    Level:      Malnutrition Characteristic Summary:       Interventions/Recommendations (treatment strategy):         A minimum of two characteristics is recommended for diagnosis of either severe or non-severe malnutrition.     Current Med:   amiodarone  100 mg Oral Daily    atorvastatin  40 mg Oral Daily    cefTRIAXone (Rocephin) IV (PEDS and ADULTS)  1 g Intravenous Q24H    docusate sodium  100 mg Oral BID    famotidine  20 mg Oral Daily    ferrous sulfate  1 tablet  Oral Daily    fluticasone propionate  2 spray Each Nostril Daily    insulin glargine U-100 (Lantus)  40 Units Subcutaneous BID    levothyroxine  150 mcg Oral Before breakfast    mupirocin   Nasal BID    pregabalin  50 mg Oral TID    tamsulosin  0.4 mg Oral Daily      PRN meds:  Current Facility-Administered Medications:     0.9%  NaCl infusion (for blood administration), , Intravenous, Q24H PRN    acetaminophen, 1,000 mg, Oral, Q6H PRN    acetaminophen, 650 mg, Oral, Q4H PRN    dextrose 10%, 12.5 g, Intravenous, PRN    dextrose 10%, 25 g, Intravenous, PRN    glucagon (human recombinant), 1 mg, Intramuscular, PRN    glucose, 16 g, Oral, PRN    glucose, 24 g, Oral, PRN    HYDROcodone-acetaminophen, 1 tablet, Oral, Q4H PRN    HYDROmorphone, 1 mg, Intravenous, Q2H PRN    hyoscyamine, 0.125 mg, Sublingual, Q4H PRN    influenza (adjuvanted), 0.5 mL, Intramuscular, vaccine x 1 dose    insulin aspart U-100, 0-10 Units, Subcutaneous, QID (AC + HS) PRN    naloxone, 0.02 mg, Intravenous, PRN    ondansetron, 4 mg, Intravenous, Q4H PRN    pneumoc 20-wander conj-dip cr(PF), 0.5 mL, Intramuscular, vaccine x 1 dose    prochlorperazine, 5 mg, Intravenous, Q6H PRN    sodium chloride 0.9%, 10 mL, Intravenous, PRN    Continuous infusion:   lactated ringers   Intravenous Continuous 100 mL/hr at 11/26/24 0538 New Bag at 11/26/24 0538        Radiology:   I have personally reviewed the images and agree with radiologist report  X-Ray Chest PA Lateral With Lordotic Vie  Indication: Preoperative respiratory evaluation     Findings: No prior studies are available for comparison. Heart size is  normal and lungs are clear bilaterally. Pulmonary vasculature is  normal.     IMPRESSION: No evidence of acute disease.        Electronically Signed By: Boone Burdick MD  Date/Time Signed: 08/13/2018 16:44        Shiv Faust MD  Department of Hospital Medicine   [facility]   11/26/2024

## 2024-11-26 NOTE — PROGRESS NOTES
UROLOGY  PROGRESS  NOTE    Landon Aragon 1950  [id number]  11/26/2024    POD # 3 s/p cystoscopy with clot evacuation and fulguration    Patient resting in bed  No acute events overnight  Feeling well this morning    VSS, afebrile   WBC 8.32   H&H 8.4/26.2   BUN and creatinine 18.3/0.83    Intake/Output:  No intake/output data recorded.  I/O last 3 completed shifts:  In: 3567.3 [P.O.:3182; Blood:385.3]  Out: 6350 [Urine:6350]     Exam:    NAD  Card: RRR  Resp: unlabored  Abd:  Soft, NT ND  :  clear yellow urine draining to  bag, minimal CBI in progress  Extremity:  Bilateral lower extremity amputations    Recent Results (from the past 24 hours)   POCT glucose    Collection Time: 11/25/24 10:59 AM   Result Value Ref Range    POCT Glucose 251 (H) 70 - 110 mg/dL   Urinalysis, Reflex to Urine Culture    Collection Time: 11/25/24 12:42 PM    Specimen: Urine   Result Value Ref Range    Color, UA Colorless Yellow, Light-Yellow, Colorless, Straw, Dark-Yellow    Appearance, UA Turbid (A) Clear    Specific Gravity, UA 1.001 (L) 1.005 - 1.030    pH, UA 5.0 5.0 - 8.5    Protein, UA Trace (A) Negative    Glucose, UA Normal Negative, Normal    Ketones, UA Negative Negative    Blood, UA 3+ (A) Negative    Bilirubin, UA Negative Negative    Urobilinogen, UA Normal 0.2, 1.0, Normal    Nitrites, UA Negative Negative    Leukocyte Esterase,  (A) Negative    RBC, UA 0-5 None Seen, 0-2, 3-5, 0-5 /HPF    WBC, UA 11-20 (A) None Seen, 0-2, 3-5, 0-5 /HPF    Bacteria, UA Occasional (A) None Seen, Trace /HPF    Budding Yeast, UA Few (A) None Seen /HPF    Hyphae Yeast, UA  Few /HPF    Squamous Epithelial Cells, UA Trace None Seen, Trace, Rare /HPF   Urine culture    Collection Time: 11/25/24 12:42 PM    Specimen: Urine   Result Value Ref Range    Urine Culture >/= 100,000 colonies/ml Gram-negative Rods (A)    POCT glucose    Collection Time: 11/25/24  4:38 PM   Result Value Ref Range    POCT Glucose 269 (H) 70 - 110 mg/dL   POCT  glucose    Collection Time: 11/25/24  9:04 PM   Result Value Ref Range    POCT Glucose 248 (H) 70 - 110 mg/dL   CBC Without Differential    Collection Time: 11/26/24  4:53 AM   Result Value Ref Range    WBC 8.32 4.50 - 11.50 x10(3)/mcL    RBC 3.11 (L) 4.70 - 6.10 x10(6)/mcL    Hgb 8.4 (L) 14.0 - 18.0 g/dL    Hct 26.2 (L) 42.0 - 52.0 %    MCV 84.2 80.0 - 94.0 fL    MCH 27.0 27.0 - 31.0 pg    MCHC 32.1 (L) 33.0 - 36.0 g/dL    RDW 16.0 11.5 - 17.0 %    Platelet 193 130 - 400 x10(3)/mcL    MPV 10.7 (H) 7.4 - 10.4 fL    NRBC% 0.0 %   Basic Metabolic Panel    Collection Time: 11/26/24  4:53 AM   Result Value Ref Range    Sodium 139 136 - 145 mmol/L    Potassium 4.1 3.5 - 5.1 mmol/L    Chloride 103 98 - 107 mmol/L    CO2 26 23 - 31 mmol/L    Glucose 65 (L) 82 - 115 mg/dL    Blood Urea Nitrogen 18.3 8.4 - 25.7 mg/dL    Creatinine 0.83 0.72 - 1.25 mg/dL    BUN/Creatinine Ratio 22     Calcium 8.2 (L) 8.8 - 10.0 mg/dL    Anion Gap 10.0 mEq/L    eGFR >60 mL/min/1.73/m2   POCT glucose    Collection Time: 11/26/24  4:58 AM   Result Value Ref Range    POCT Glucose 60 (L) 70 - 110 mg/dL   POCT glucose    Collection Time: 11/26/24  5:40 AM   Result Value Ref Range    POCT Glucose 53 (L) 70 - 110 mg/dL   POCT glucose    Collection Time: 11/26/24  6:07 AM   Result Value Ref Range    POCT Glucose 74 70 - 110 mg/dL   POCT glucose    Collection Time: 11/26/24  9:00 AM   Result Value Ref Range    POCT Glucose 272 (H) 70 - 110 mg/dL       Assessment:  Gross hematuria status post recent cystoscopy with clot evacuation fulguration 10/08/2024  -began after heath exchange prior to admission, likely heath trauma with blood thinners  -status post cystoscopy with clot evacuation and fulguration of bladder and prostate 11/23/2024  -h/o BPH    Anemia   -s/p transfusion yesterday    Plan:  -CBI stopped during rounds. Resume as needed for recurrent hematuria/clots. OK for pink urine  -If urine remains clear through the night, Ok to resume eliquis  tomorrow  -He will be discharged with three way heath in place and will f/u outpatient for void trial.   -discussed with the patient and nursing   -following      Loreta Schreiber NP

## 2024-11-26 NOTE — PT/OT/SLP PROGRESS
Occupational Therapy   Treatment    Name: Landon Aragon  MRN: [id number]  Admitting Diagnosis: Gross Hematuria 2/2 traumatic heath, anemia, cystoscopy with clot evacuation, a-fib,   3 Days Post-Op    Recommendations:     Recommended therapy intensity at discharge: Moderate Intensity Therapy   Discharge Equipment Recommendations:  to be determined by next level of care  Barriers to discharge:       Assessment:     Landon Aragon is a 74 y.o. male with a medical diagnosis of Gross Hematuria 2/2 traumatic heath, anemia, cystoscopy with clot evacuation, a-fib. PmHx: R AKA and L BKA . Performance deficits affecting function are weakness, impaired endurance, impaired self care skills, impaired functional mobility, gait instability, impaired balance, decreased upper extremity function, decreased coordination.     Rehab Prognosis:  Good; patient would benefit from acute skilled OT services to address these deficits and reach maximum level of function.       Plan:     Patient to be seen 4 x/week to address the above listed problems via self-care/home management, therapeutic activities, therapeutic exercises  Plan of Care Expires: 12/25/24  Plan of Care Reviewed with: patient    Subjective     Pain/Comfort:  Pain Rating 1: 0/10    Objective:     Communicated with: RN prior to session.  Patient found HOB elevated with telemetry, heath catheter, peripheral IV, oxygen upon OT entry to room.    General Precautions: Standard, fall    Orthopedic Precautions: N/A  Braces: N/A  Respiratory Status: Nasal cannula, flow 3 L/min     Occupational Performance:     Bed Mobility:    Patient completed Supine to Sit with moderate assistance  Patient completed Sit to Supine with moderate assistance   Patient required Max-A X2 persons for proper positioning in center of bed.  Patient required Mod-A for rolling to R for placement of wedge    Functional Mobility/Transfers:  Pt completed Slide board t/f Bed>Chair requiring Mod-A X2 persons.  Pt  "completed Slide board t/f Chair>Bed requiring Max-A X2 persons.  Pt utilized BUE for w/c propulsion within room bedside<>sink with ability to maneuver around obstacles, requiring SBA for increased safety. Seat belt donned during time spent in w/c.    Activities of Daily Living:  Pt performed grooming ADLs while seated in w/c at sink, requiring SBA.    Therapeutic Exercise:  - Pt educated in 3 BUE exercises to increase strength required for increasing (I) in safe transfers and for increasing activity tolerance required for w/c propulsion. Upon education, pt given yellow Theraband for demonstrating 1 set X 5 repetitions on each UE for each exercise, including bicep curls, tricep presses, and horizontal shoulder abduction. Pt educated in performing exercises on his own time for "hw" for increasing BUE strength.    Therapeutic Positioning    OT interventions performed during the course of today's session in an effort to prevent and/or reduce acquired pressure injuries:   Education was provided on benefits of and recommendations for therapeutic positioning  Therapeutic positioning was provided at the conclusion of session to offload all bony prominences for the prevention and/or reduction of pressure injuries    Skin assessment:  visible skin assessed   Findings:  blister rubbed open on L residual limb with slight bleeding; Nursing student to apply bandage post-session.     [facility] 6 Click ADL: 16    Patient Education:  Patient provided with verbal education education regarding OT role/goals/POC, fall prevention, safety awareness, and pressure ulcer prevention.  Understanding was verbalized, however additional teaching warranted.      Patient left HOB elevated with all lines intact, call button in reach, wedge under L side, chair alarm on, and nursing student present present.    GOALS:   Multidisciplinary Problems       Occupational Therapy Goals          Problem: Occupational Therapy    Goal Priority Disciplines Outcome " Interventions   Occupational Therapy Goal     OT, PT/OT Progressing    Description: Goals to be met by: in 1  month      Patient will increase functional independence with ADLs by performing:    LE Dressing with Supervision.  Toileting from bedside commode with Supervision for hygiene and clothing management.   Toilet transfer to bedside commode with Stand-by Assistance with scoot/slide board.   Increased functional strength to 4/5 for BUE.                         Time Tracking:     OT Date of Treatment: 11/26/24  OT Start Time: 1313  OT Stop Time: 1351  OT Total Time (min): 38 min    Billable Minutes:Self Care/Home Management 2  Therapeutic Exercise 1          Number of LIZETH visits since last OT visit: 1    11/26/2024

## 2024-11-26 NOTE — CARE UPDATE
[id number] Spoke with Dr Faust who reports pt may be ready for dc on Thursday, Thanksgiving day. She asked I contact the NH to see if pt can be dc on that day if pt was medically stable. Left a voice message with Annika with [facility]

## 2024-11-26 NOTE — PT/OT/SLP PROGRESS
Physical Therapy Treatment    Patient Name:  Landon Aragon   MRN:  [id number]    Recommendations:     Discharge therapy intensity: Moderate Intensity Therapy   Discharge Equipment Recommendations:  (TBD by next level of care)  Barriers to discharge: Impaired mobility and Ongoing medical needs    Assessment:     Landon Aragon is a 74 y.o. male admitted with a medical diagnosis of penile bleeding.  He presents with the following impairments/functional limitations: impaired endurance, weakness, impaired self care skills, impaired functional mobility, impaired balance, decreased lower extremity function, decreased safety awareness, pain.    Rehab Prognosis: Good; patient would benefit from acute skilled PT services to address these deficits and reach maximum level of function.    Recent Surgery: Procedure(s) (LRB):  FULGURATION, BLADDER (N/A)  EVACUATION, HEMATOMA (N/A) 3 Days Post-Op    Plan:     During this hospitalization, patient would benefit from acute PT services 5 x/week to address the identified rehab impairments via therapeutic activities, therapeutic exercises, neuromuscular re-education, wheelchair management/training and progress toward the following goals:    Plan of Care Expires:  12/22/24    Subjective     Chief Complaint: pain  Patient/Family Comments/goals: none  Pain/Comfort:  Pain Rating 1: 3/10  Location 1: penis  Pain Addressed 1: Distraction, Reposition  Pain Rating Post-Intervention 1: 3/10      Objective:     Communicated with nurse prior to session.  Patient found supine with telemetry, heath catheter, peripheral IV upon PT entry to room.     General Precautions: Standard, fall  Orthopedic Precautions: N/A  Braces: N/A  Respiratory Status: Nasal cannula, flow 3 L/min  Skin Integrity: Visible skin intact      Functional Mobility:  Bed Mobility:  Supine to Sit: moderate assistance  Sit to Supine: minimum assistance  Transfers:    Bed to Chair: minimum assistance and moderate assistance with slide  board    Chair to Bed: MAX A with slide board  Balance: fair  Wheelchair Propulsion:  Pt propelled Standard wheelchair x 95 feet on Level tile with  Bilateral upper extremity with Minimal Assistance.       Patient left up in chair for ~1 hour. He was transferred back to bed via SB with MAX.     Education Provided:  Role and goals of PT, transfer training, bed mobility, WC training, balance training, safety awareness, assistive device, strengthening exercises, and importance of participating in PT to return to PLOF.    Patient left supine with all lines intact, call button in reach, and student nurse present    GOALS:   Multidisciplinary Problems       Physical Therapy Goals          Problem: Physical Therapy    Goal Priority Disciplines Outcome Interventions   Physical Therapy Goal     PT, PT/OT Progressing    Description: Goals to be met by: 24     Patient will increase functional independence with mobility by performin. Supine to sit with Set-up [place]  2. Sit to supine with Set-up [place]  3. Rolling to Left and Right with [place].  4. Bed to chair transfer with Stand-by Assistance using LRAD  5. Wheelchair propulsion x200 feet with [place] using bilateral uppper extremities                         Time Tracking:     PT Received On: 24  PT Start Time 1st session: 807     PT Stop Time 1st session: 833  PT Start Time 2nd session: 926     PT Stop Time 2nd session: 940  PT Total Time (min): 40 min     Billable Minutes: Therapeutic Activity 40 minutes    Treatment Type: Treatment  PT/PTA: PT     Number of PTA visits since last PT visit: 2024

## 2024-11-26 NOTE — CARE UPDATE
[id number] Spoke with Loreta with urology re dc plan. She would prefer pt stay in hospital till Friday. She clamped the CBI however pt would have to start back on his blood thinner and she would like to monitor that

## 2024-11-26 NOTE — CARE UPDATE
[id number] Rec call from Annika Mesa who reported if they have to take pt back on Thanksgiving day they will however they would prefer not to since they will not have transportation that day.  Informed Dr Faust

## 2024-11-27 LAB
BACTERIA UR CULT: ABNORMAL
ERYTHROCYTE [DISTWIDTH] IN BLOOD BY AUTOMATED COUNT: 15.9 % (ref 11.5–17)
HCT VFR BLD AUTO: 26.7 % (ref 42–52)
HGB BLD-MCNC: 8.9 G/DL (ref 14–18)
MCH RBC QN AUTO: 27.5 PG (ref 27–31)
MCHC RBC AUTO-ENTMCNC: 33.3 G/DL (ref 33–36)
MCV RBC AUTO: 82.4 FL (ref 80–94)
NRBC BLD AUTO-RTO: 0 %
PLATELET # BLD AUTO: 205 X10(3)/MCL (ref 130–400)
PMV BLD AUTO: 10.7 FL (ref 7.4–10.4)
POCT GLUCOSE: 133 MG/DL (ref 70–110)
POCT GLUCOSE: 200 MG/DL (ref 70–110)
POCT GLUCOSE: 257 MG/DL (ref 70–110)
POCT GLUCOSE: 262 MG/DL (ref 70–110)
POCT GLUCOSE: 30 MG/DL (ref 70–110)
RBC # BLD AUTO: 3.24 X10(6)/MCL (ref 4.7–6.1)
WBC # BLD AUTO: 9.24 X10(3)/MCL (ref 4.5–11.5)

## 2024-11-27 PROCEDURE — 63600175 PHARM REV CODE 636 W HCPCS: Performed by: INTERNAL MEDICINE

## 2024-11-27 PROCEDURE — 97530 THERAPEUTIC ACTIVITIES: CPT

## 2024-11-27 PROCEDURE — 11000001 HC ACUTE MED/SURG PRIVATE ROOM

## 2024-11-27 PROCEDURE — 25000003 PHARM REV CODE 250: Performed by: INTERNAL MEDICINE

## 2024-11-27 PROCEDURE — 63600175 PHARM REV CODE 636 W HCPCS: Performed by: NURSE PRACTITIONER

## 2024-11-27 PROCEDURE — 36415 COLL VENOUS BLD VENIPUNCTURE: CPT | Performed by: INTERNAL MEDICINE

## 2024-11-27 PROCEDURE — 94799 UNLISTED PULMONARY SVC/PX: CPT

## 2024-11-27 PROCEDURE — 25000003 PHARM REV CODE 250: Performed by: SPECIALIST

## 2024-11-27 PROCEDURE — 85027 COMPLETE CBC AUTOMATED: CPT | Performed by: INTERNAL MEDICINE

## 2024-11-27 PROCEDURE — 99900035 HC TECH TIME PER 15 MIN (STAT)

## 2024-11-27 PROCEDURE — 25000003 PHARM REV CODE 250: Performed by: NURSE PRACTITIONER

## 2024-11-27 PROCEDURE — 94760 N-INVAS EAR/PLS OXIMETRY 1: CPT

## 2024-11-27 PROCEDURE — 27000221 HC OXYGEN, UP TO 24 HOURS

## 2024-11-27 RX ORDER — INSULIN GLARGINE 100 [IU]/ML
40 INJECTION, SOLUTION SUBCUTANEOUS EVERY MORNING
Status: DISCONTINUED | OUTPATIENT
Start: 2024-11-28 | End: 2024-11-27

## 2024-11-27 RX ORDER — HYDRALAZINE HYDROCHLORIDE 20 MG/ML
5 INJECTION INTRAMUSCULAR; INTRAVENOUS EVERY 4 HOURS PRN
Status: DISCONTINUED | OUTPATIENT
Start: 2024-11-27 | End: 2024-11-29 | Stop reason: HOSPADM

## 2024-11-27 RX ORDER — INSULIN GLARGINE 100 [IU]/ML
40 INJECTION, SOLUTION SUBCUTANEOUS EVERY MORNING
Status: DISCONTINUED | OUTPATIENT
Start: 2024-11-27 | End: 2024-11-29 | Stop reason: HOSPADM

## 2024-11-27 RX ADMIN — WHITE PETROLATUM: 1.75 OINTMENT TOPICAL at 10:11

## 2024-11-27 RX ADMIN — INSULIN GLARGINE 40 UNITS: 100 INJECTION, SOLUTION SUBCUTANEOUS at 10:11

## 2024-11-27 RX ADMIN — ATORVASTATIN CALCIUM 40 MG: 40 TABLET, FILM COATED ORAL at 10:11

## 2024-11-27 RX ADMIN — WHITE PETROLATUM: 1.75 OINTMENT TOPICAL at 08:11

## 2024-11-27 RX ADMIN — PREGABALIN 50 MG: 50 CAPSULE ORAL at 10:11

## 2024-11-27 RX ADMIN — FERROUS SULFATE TAB 325 MG (65 MG ELEMENTAL FE) 1 EACH: 325 (65 FE) TAB at 10:11

## 2024-11-27 RX ADMIN — LEVOTHYROXINE SODIUM 150 MCG: 150 TABLET ORAL at 05:11

## 2024-11-27 RX ADMIN — TAMSULOSIN HYDROCHLORIDE 0.4 MG: 0.4 CAPSULE ORAL at 10:11

## 2024-11-27 RX ADMIN — INSULIN ASPART 2 UNITS: 100 INJECTION, SOLUTION INTRAVENOUS; SUBCUTANEOUS at 05:11

## 2024-11-27 RX ADMIN — DOCUSATE SODIUM 100 MG: 100 CAPSULE, LIQUID FILLED ORAL at 08:11

## 2024-11-27 RX ADMIN — PIPERACILLIN AND TAZOBACTAM 4.5 G: 4; .5 INJECTION, POWDER, LYOPHILIZED, FOR SOLUTION INTRAVENOUS; PARENTERAL at 11:11

## 2024-11-27 RX ADMIN — HYDROCODONE BITARTRATE AND ACETAMINOPHEN 1 TABLET: 10; 325 TABLET ORAL at 02:11

## 2024-11-27 RX ADMIN — FLUTICASONE PROPIONATE 100 MCG: 50 SPRAY, METERED NASAL at 10:11

## 2024-11-27 RX ADMIN — PREGABALIN 50 MG: 50 CAPSULE ORAL at 08:11

## 2024-11-27 RX ADMIN — DEXTROSE MONOHYDRATE 250 ML: 100 INJECTION, SOLUTION INTRAVENOUS at 05:11

## 2024-11-27 RX ADMIN — DOCUSATE SODIUM 100 MG: 100 CAPSULE, LIQUID FILLED ORAL at 10:11

## 2024-11-27 RX ADMIN — SODIUM CHLORIDE, POTASSIUM CHLORIDE, SODIUM LACTATE AND CALCIUM CHLORIDE: 600; 310; 30; 20 INJECTION, SOLUTION INTRAVENOUS at 03:11

## 2024-11-27 RX ADMIN — HYDROCODONE BITARTRATE AND ACETAMINOPHEN 1 TABLET: 10; 325 TABLET ORAL at 07:11

## 2024-11-27 RX ADMIN — PIPERACILLIN AND TAZOBACTAM 4.5 G: 4; .5 INJECTION, POWDER, LYOPHILIZED, FOR SOLUTION INTRAVENOUS; PARENTERAL at 05:11

## 2024-11-27 RX ADMIN — HYDROCODONE BITARTRATE AND ACETAMINOPHEN 1 TABLET: 10; 325 TABLET ORAL at 08:11

## 2024-11-27 RX ADMIN — APIXABAN 5 MG: 5 TABLET, FILM COATED ORAL at 08:11

## 2024-11-27 RX ADMIN — PREGABALIN 50 MG: 50 CAPSULE ORAL at 02:11

## 2024-11-27 RX ADMIN — HYDRALAZINE HYDROCHLORIDE 5 MG: 20 INJECTION INTRAMUSCULAR; INTRAVENOUS at 05:11

## 2024-11-27 RX ADMIN — FAMOTIDINE 20 MG: 20 TABLET, FILM COATED ORAL at 10:11

## 2024-11-27 RX ADMIN — INSULIN ASPART 6 UNITS: 100 INJECTION, SOLUTION INTRAVENOUS; SUBCUTANEOUS at 11:11

## 2024-11-27 RX ADMIN — AMIODARONE HYDROCHLORIDE 100 MG: 100 TABLET ORAL at 10:11

## 2024-11-27 NOTE — PROGRESS NOTES
Inpatient Nutrition Evaluation    Admit Date: 11/21/2024   Total duration of encounter: 6 days   Patient Age: 74 y.o.    Nutrition Recommendation/Prescription     -Continue Cardiac, 2000 Calorie Diabetic Diet as tolerated.   -Juan BID for wound healing.   -Monitor wt, labs, and intake.     Nutrition Assessment     Chart Review    Reason Seen: length of stay    Malnutrition Screening Tool Results   Have you recently lost weight without trying?: No  Have you been eating poorly because of a decreased appetite?: No   MST Score: 0   Diagnosis:  Gross hematuria status post recent cystoscopy with clot evacuation fulguration 10/08/2024  Anemia    Relevant Medical History: congestive heart failure, diabetes mellitus type 2, right AKA and left BKA due to diabetic neuropathy, hard of hearing, hyperlipidemia, hypertension, peripheral arterial disease     Scheduled Medications:  amiodarone, 100 mg, Daily  atorvastatin, 40 mg, Daily  docusate sodium, 100 mg, BID  famotidine, 20 mg, Daily  ferrous sulfate, 1 tablet, Daily  fluticasone propionate, 2 spray, Daily  insulin glargine U-100 (Lantus), 40 Units, QAM  levothyroxine, 150 mcg, Before breakfast  piperacillin-tazobactam (Zosyn) IV (PEDS and ADULTS) (extended infusion is not appropriate), 4.5 g, Q8H  pregabalin, 50 mg, TID  tamsulosin, 0.4 mg, Daily  white petrolatum, , BID    Continuous Infusions:  lactated ringers, Last Rate: 100 mL/hr at 11/26/24 1546    PRN Medications:   Current Facility-Administered Medications:     0.9%  NaCl infusion (for blood administration), , Intravenous, Q24H PRN    acetaminophen, 1,000 mg, Oral, Q6H PRN    acetaminophen, 650 mg, Oral, Q4H PRN    dextrose 10%, 12.5 g, Intravenous, PRN    dextrose 10%, 25 g, Intravenous, PRN    glucagon (human recombinant), 1 mg, Intramuscular, PRN    glucose, 16 g, Oral, PRN    glucose, 24 g, Oral, PRN    HYDROcodone-acetaminophen, 1 tablet, Oral, Q4H PRN    HYDROmorphone, 1 mg, Intravenous, Q2H PRN     hyoscyamine, 0.125 mg, Sublingual, Q4H PRN    influenza (adjuvanted), 0.5 mL, Intramuscular, vaccine x 1 dose    insulin aspart U-100, 0-10 Units, Subcutaneous, QID (AC + HS) PRN    naloxone, 0.02 mg, Intravenous, PRN    ondansetron, 4 mg, Intravenous, Q4H PRN    pneumoc 20-wander conj-dip cr(PF), 0.5 mL, Intramuscular, vaccine x 1 dose    prochlorperazine, 5 mg, Intravenous, Q6H PRN    sodium chloride 0.9%, 10 mL, Intravenous, PRN    Recent Labs   Lab 11/21/24  1051 11/21/24  1230 11/21/24  2105 11/22/24  0556 11/22/24  1505 11/23/24  0453 11/24/24  0558 11/25/24  0434 11/26/24  0453 11/27/24  0435   *  --   --  135*  --  134* 139 137 139  --    K 4.8  --   --  4.6  --  4.1 5.2* 4.2 4.1  --    CALCIUM 8.4*  --   --  8.5*  --  8.3* 9.0 8.4* 8.2*  --    MG  --   --   --  2.10  --   --   --   --   --   --      --   --  102  --  103 103 104 103  --    CO2 26  --   --  23  --  26 27 24 26  --    BUN 46.2*  --   --  39.2*  --  45.7* 33.4* 29.3* 18.3  --    CREATININE 1.88*  --   --  1.31*  --  1.56* 1.19 0.85 0.83  --    EGFRNORACEVR 37  --   --  58  --  47 >60 >60 >60  --    GLUCOSE 311*  --   --  232*  --  269* 212* 35* 65*  --    BILITOT 0.5  --   --  0.4  --   --   --   --   --   --    ALKPHOS 169*  --   --  158*  --   --   --   --   --   --    ALT 18  --   --  17  --   --   --   --   --   --    AST 15  --   --  16  --   --   --   --   --   --    ALBUMIN 2.5*  --   --  2.5*  --   --   --   --   --   --    HGBA1C  --  7.5*  --   --   --   --   --   --   --   --    WBC 15.08*  --   --  10.50  --  12.24* 12.61* 13.26* 8.32 9.24   HGB 8.1* 8.0*   < > 7.9* 6.7* 7.0* 8.5* 7.6* 8.4* 8.9*   HCT 26.2* 25.4*   < > 25.4* 22.0* 23.0* 26.3* 23.3* 26.2* 26.7*    < > = values in this interval not displayed.     Nutrition Orders:  Diet diabetic Cardiac (Low Na/Chol); 2000 Calories (up to 75 gm per meal)      Appetite/Oral Intake: good/% of meals  Factors Affecting Nutritional Intake: none  "identified  Food/Tenriism/Cultural Preferences: none reported  Food Allergies: no known food allergies  Last Bowel Movement: 11/25/24  Wound(s):     Wound 11/21/24 1030 Pressure Injury Left medial Buttocks-Tissue loss description: Full thickness       Wound 11/21/24 1030 Pressure Injury Right Buttocks-Tissue loss description: Partial thickness       Wound 11/21/24 1810 Other (comment) medial Upper quadrant-Tissue loss description: Partial thickness     Comments    11/27/24: Pt working with PT during rounds; pt ate well this morning per RN; no wt loss noted per EMR. Note multiple wounds- will add Juan.     Anthropometrics    Height: 5' 10" (177.8 cm),    Last Weight: (!) 158.8 kg (350 lb 1.5 oz) (11/22/24 1121), Weight Method: Bed Scale  BMI (Calculated): 50.2  BMI Classification: obese grade III (BMI >/=40)        Ideal Body Weight (IBW), Male: 166 lb     % Ideal Body Weight, Male (lb): 210.9 %  Amputation %: 16, 5.9  Total Amputation %: 21.9                    Usual Weight Provided By: EMR weight history    Wt Readings from Last 5 Encounters:   11/22/24 (!) 158.8 kg (350 lb 1.5 oz)   01/13/23 (!) 158.8 kg (350 lb)   09/25/18 127 kg (279 lb 15.8 oz)     Weight Change(s) Since Admission:   Wt Readings from Last 1 Encounters:   11/22/24 1121 (!) 158.8 kg (350 lb 1.5 oz)   11/21/24 1710 (!) 158.8 kg (350 lb)   11/21/24 1023 (!) 158.8 kg (350 lb)   Admit Weight: (!) 158.8 kg (350 lb) (11/21/24 1023), Weight Method: Standard Scale    Patient Education     Not applicable.    Nutrition Goals & Monitoring     Dietitian will monitor: energy intake and weight change    Nutrition Risk/Follow-Up: low (follow-up in 5-7 days)  Patients assigned 'low nutrition risk' status do not qualify for a full nutritional assessment but will be monitored and re-evaluated in a 5-7 day time period. Please consult if re-evaluation needed sooner.   "

## 2024-11-27 NOTE — PROGRESS NOTES
UROLOGY  PROGRESS  NOTE    Landon Aragon 1950  [id number]  11/27/2024    POD # 4 s/p cystoscopy with clot evacuation and fulguration    Patient sitting up in chair   No acute events overnight   Remains off of CBI    VSS, afebrile   WBC 9.24   H&H 8.9/26.7       Intake/Output:  No intake/output data recorded.  I/O last 3 completed shifts:  In: 2040 [P.O.:2040]  Out: 5375 [Urine:5375]     Exam:    NAD  Card: RRR  Resp: unlabored  Abd:  Soft, NT ND  :  Light yellow urine in  bag, slightly pink tinged urine in  tubing, no clots or sediment.  Extremity:  Bilateral lower extremity amputations    Recent Results (from the past 24 hours)   POCT glucose    Collection Time: 11/26/24  4:47 PM   Result Value Ref Range    POCT Glucose 216 (H) 70 - 110 mg/dL   POCT glucose    Collection Time: 11/26/24  8:03 PM   Result Value Ref Range    POCT Glucose 231 (H) 70 - 110 mg/dL   CBC Without Differential    Collection Time: 11/27/24  4:35 AM   Result Value Ref Range    WBC 9.24 4.50 - 11.50 x10(3)/mcL    RBC 3.24 (L) 4.70 - 6.10 x10(6)/mcL    Hgb 8.9 (L) 14.0 - 18.0 g/dL    Hct 26.7 (L) 42.0 - 52.0 %    MCV 82.4 80.0 - 94.0 fL    MCH 27.5 27.0 - 31.0 pg    MCHC 33.3 33.0 - 36.0 g/dL    RDW 15.9 11.5 - 17.0 %    Platelet 205 130 - 400 x10(3)/mcL    MPV 10.7 (H) 7.4 - 10.4 fL    NRBC% 0.0 %   POCT glucose    Collection Time: 11/27/24  5:27 AM   Result Value Ref Range    POCT Glucose 30 (LL) 70 - 110 mg/dL   POCT glucose    Collection Time: 11/27/24  5:53 AM   Result Value Ref Range    POCT Glucose 133 (H) 70 - 110 mg/dL   POCT glucose    Collection Time: 11/27/24  9:35 AM   Result Value Ref Range    POCT Glucose 262 (H) 70 - 110 mg/dL   POCT glucose    Collection Time: 11/27/24 11:18 AM   Result Value Ref Range    POCT Glucose 257 (H) 70 - 110 mg/dL       Assessment:  Gross hematuria status post recent cystoscopy with clot evacuation fulguration 10/08/2024  -began after heath exchange prior to admission, likely heath trauma with  blood thinners  -status post cystoscopy with clot evacuation and fulguration of bladder and prostate 11/23/2024  -h/o BPH    Anemia   -s/p multiple transfusions, stable    Plan:  -okay to plug irrigation port on Sharp   -okay to resume thinners from Urology standpoint   -resume CBI as needed for recurrent hematuria or clots, okay for pink urine as long as Sharp is draining.  -plan for discharge back to nursing home with Sharp catheter in place and irrigation port plugged.  He will follow up outpatient for void trial with Dr. Farrell  -discussed with the patient and nursing       Loreta Schreiber NP

## 2024-11-27 NOTE — PROGRESS NOTES
[facility] Hospital Medicine Progress Note        Chief Complaint: Inpatient Follow-up for dramatic hematuria post Heath exchange    HPI per admitting team: 74-year-old male with known past medical history of congestive heart failure, diabetes mellitus type 2, right AKA and left BKA due to diabetic neuropathy, hard of hearing, hyperlipidemia, hypertension, peripheral arterial disease, currently at [facility] admitted 11/21/2024 for hematuria/penile bleeding after Heath was exchanged yesterday at [facility], concern for traumatic injury.  In the ED patient had Heath placed and CBI started.  Urology consulted.  Patient has no complaints currently, denies any pain.  Urine in Heath bag is still red.  Informed patient that urology has been consulted and they will evaluate him soon.  Verbalized understanding  Did informed patient that will be will hold off his aspirin and Eliquis for now and resume other home medication.  Labs reviewed, WBC 15 K, H&H 8.0/25.4, platelets 372, no left shift  PT INR 17.6/1.4  BMP shows sodium of 133, BUN creatinine 46.2/1.8, glucose 311, , AST/ALT within normal range    Interval Hx:   Patient is  laying in bed, no complaints.  Informed that his hemoglobin is stable  Discussed discharge plan for Friday when his facility will have the transportation available   No family is at bedside  Case was discussed with patient's nurse and  on the floor.     Objective/physical exam:  General: In no acute distress, afebrile  Chest: Clear to auscultation bilaterally  Heart: RRR, +S1, S2, no appreciable murmur  Abdomen: Soft, nontender, BS +, hyperkeratotic skin abdomen with dressing   MSK: Warm, Right AKA, left BKA   : Gross hematuria in heath bag  Neurologic: Alert and oriented x4, Cranial nerve II-XII intact, able to move all 4 extremities    VITAL SIGNS: 24 HRS MIN & MAX LAST   Temp  Min: 97.9 °F (36.6 °C)  Max: 99.5 °F (37.5 °C) 98.3 °F (36.8 °C)    BP  Min: 103/58  Max: 159/71 (!) 151/79   Pulse  Min: 67  Max: 87  87   Resp  Min: 16  Max: 18 18   SpO2  Min: 97 %  Max: 100 % 100 %     I reviewed the labs below:  Recent Labs   Lab 11/25/24 0434 11/26/24 0453 11/27/24 0435   WBC 13.26* 8.32 9.24   RBC 2.80* 3.11* 3.24*   HGB 7.6* 8.4* 8.9*   HCT 23.3* 26.2* 26.7*   MCV 83.2 84.2 82.4   MCH 27.1 27.0 27.5   MCHC 32.6* 32.1* 33.3   RDW 16.3 16.0 15.9    193 205   MPV 10.1 10.7* 10.7*     Recent Labs   Lab 11/21/24  1051 11/22/24  0556 11/23/24 0453 11/24/24  0558 11/25/24 0434 11/26/24 0453   * 135*   < > 139 137 139   K 4.8 4.6   < > 5.2* 4.2 4.1    102   < > 103 104 103   CO2 26 23   < > 27 24 26   BUN 46.2* 39.2*   < > 33.4* 29.3* 18.3   CREATININE 1.88* 1.31*   < > 1.19 0.85 0.83   CALCIUM 8.4* 8.5*   < > 9.0 8.4* 8.2*   MG  --  2.10  --   --   --   --    ALBUMIN 2.5* 2.5*  --   --   --   --    ALKPHOS 169* 158*  --   --   --   --    ALT 18 17  --   --   --   --    AST 15 16  --   --   --   --    BILITOT 0.5 0.4  --   --   --   --     < > = values in this interval not displayed.     Microbiology Results (last 7 days)       Procedure Component Value Units Date/Time    Urine culture [[id number]]  (Abnormal)  (Susceptibility) Collected: 11/25/24 1242    Order Status: Completed Specimen: Urine Updated: 11/27/24 0658     Urine Culture >/= 100,000 colonies/ml Escherichia coli ESBL           See below for Radiology    Intake and Output:    Intake/Output Summary (Last 24 hours) at 11/27/2024 1031  Last data filed at 11/27/2024 0500  Gross per 24 hour   Intake 1440 ml   Output 6775 ml   Net -5335 ml       Assessment/Plan:  Gross Hematuria secondary to Traumatic Sharp exchange at [facility] 11/20 s/p cystoscopy with clot evacuation and fulguration  ESBL Ecoli UTI vs colonization  Acute blood loss anemia due to above, requiring multiple blood transfusion- now stable   Paroxysmal Afib on long-term oral anticoagulation- currently on hold since  admission due to gross hematuria   Diabetes mellitus type 2 with hyperglycemia  Hypoglycemia this am again  Hypertension, hyperlipidemia  CKD 3B- baseline creatinine 1.4-1.8  Hypothyroidism  Diabetic neuropathy/her prevascular disease/peripheral arterial disease- status post right AKA and left BKA  CAD, history of CHF now recovered to EF 50-55%  Morbid obesity with BMI 50.2  Hyperkalemia- resolved  Hyperkeratosis abdominal wall- chronic   Sacral decubitus- stage III full-thickness pressure wound left medial [place], stage II partial thickness wound on right buttock- POA          Admitted as inpatient on 11/21  On CBI  Urology on board, appreciate recommendation, Sharp exchanged and CBI infusing at high rate  11/23- underwent cystoscopy with clot evacuation and fulguration  Patient has received total of 4 units packed red blood cells since admission, hemoglobin now 8.9  Aspirin and Eliquis on hold since admission, resume once cleared by Urology  Leukocytosis resolved now 9k  Urine culture ESBL E Coli, active infection versus colonization.  Chart reviewed, last urine culture 05/2023 was also positive for ESBL E coli  Ceftriaxone changed to Zosyn per sensitivity-day 1 of 3  On insulin sliding scale medium and change Lantus to 40 units in a.m. only given hypoglycemia in am (takes 75 unit bid at home)  Hypoglycemia again this morning with glucose 30, pt was asymptomatic,  after breakfast again went up to 259  No juices, crackers, Kenny crackers, grapes, cantaloupe, pineapple on my patient's tray  A1C 7.5  Creatinine 1.1, baseline CKD 3B  Potassium normalized to 4.2  Appropriate home medication for chronic medical condition has been resumed  Consult PT OT as patient coming from [facility]- recommending moderate therapy, patient to return to [facility] Friday given they do not have transportation available tomorrow  Diabetic cardiac diet postprocedure  Insulin sliding scale medium AC HS  Antihypertensives on hold given  softer BP  Wound care on board for sacral decubitus and abdominal wall hyperkeratosis   Morning CBC, bmp ordered      VTE Prophylaxis:  SCDs, hold off chemical prophylaxis given gross hematuria, resume Eliquis/aspirin once cleared by Urology     Patient condition:  fair     Discharge Planning and Disposition/Anticipated discharge:  Return to [facility] probably friday        All diagnosis and differential diagnosis have been reviewed; assessment and plan has been documented; I have personally reviewed the labs and test results that are presently available; I have reviewed the patients medication list; I have reviewed the consulting providers response and recommendations. I have reviewed or attempted to review medical records based upon their availability    All of the patient's questions have been  addressed and answered. Patient's is agreeable to the above stated plan. I will continue to monitor closely and make adjustments to medical management as needed.    Portions of this note dictated using EMR integrated voice recognition software, and may be subject to voice recognition errors not corrected at proofreading. Please contact writer for clarification if needed.   _____________________________________________________________________    Nutrition Status:  Nutrition consulted. Most recent weight and BMI monitored-     Measurements:  Wt Readings from Last 1 Encounters:   11/22/24 (!) 158.8 kg (350 lb 1.5 oz)   Body mass index is 50.23 kg/m².    Patient has been screened and assessed by RD.    Malnutrition Type:  Context:    Level:      Malnutrition Characteristic Summary:       Interventions/Recommendations (treatment strategy):         A minimum of two characteristics is recommended for diagnosis of either severe or non-severe malnutrition.     Current Med:   amiodarone  100 mg Oral Daily    atorvastatin  40 mg Oral Daily    docusate sodium  100 mg Oral BID    famotidine  20 mg Oral Daily    ferrous sulfate  1  tablet Oral Daily    fluticasone propionate  2 spray Each Nostril Daily    insulin glargine U-100 (Lantus)  40 Units Subcutaneous QAM    levothyroxine  150 mcg Oral Before breakfast    piperacillin-tazobactam (Zosyn) IV (PEDS and ADULTS) (extended infusion is not appropriate)  4.5 g Intravenous Q8H    pregabalin  50 mg Oral TID    tamsulosin  0.4 mg Oral Daily    white petrolatum   Topical (Top) BID      PRN meds:  Current Facility-Administered Medications:     0.9%  NaCl infusion (for blood administration), , Intravenous, Q24H PRN    acetaminophen, 1,000 mg, Oral, Q6H PRN    acetaminophen, 650 mg, Oral, Q4H PRN    dextrose 10%, 12.5 g, Intravenous, PRN    dextrose 10%, 25 g, Intravenous, PRN    glucagon (human recombinant), 1 mg, Intramuscular, PRN    glucose, 16 g, Oral, PRN    glucose, 24 g, Oral, PRN    HYDROcodone-acetaminophen, 1 tablet, Oral, Q4H PRN    HYDROmorphone, 1 mg, Intravenous, Q2H PRN    hyoscyamine, 0.125 mg, Sublingual, Q4H PRN    influenza (adjuvanted), 0.5 mL, Intramuscular, vaccine x 1 dose    insulin aspart U-100, 0-10 Units, Subcutaneous, QID (AC + HS) PRN    naloxone, 0.02 mg, Intravenous, PRN    ondansetron, 4 mg, Intravenous, Q4H PRN    pneumoc 20-wander conj-dip cr(PF), 0.5 mL, Intramuscular, vaccine x 1 dose    prochlorperazine, 5 mg, Intravenous, Q6H PRN    sodium chloride 0.9%, 10 mL, Intravenous, PRN    Continuous infusion:   lactated ringers   Intravenous Continuous 100 mL/hr at 11/26/24 1546 New Bag at 11/26/24 1546        Radiology:   I have personally reviewed the images and agree with radiologist report  X-Ray Chest PA Lateral With Lordotic Vie  Indication: Preoperative respiratory evaluation     Findings: No prior studies are available for comparison. Heart size is  normal and lungs are clear bilaterally. Pulmonary vasculature is  normal.     IMPRESSION: No evidence of acute disease.        Electronically Signed By: Boone Burdick MD  Date/Time Signed: 08/13/2018  16:44        Shiv Faust MD  Department of Hospital Medicine   [facility]   11/27/2024

## 2024-11-27 NOTE — PT/OT/SLP PROGRESS
Physical Therapy Treatment    Patient Name:  Landon Aragon   MRN:  [id number]    Recommendations:     Discharge therapy intensity: Moderate Intensity Therapy   Discharge Equipment Recommendations:  (TBD by next level of care)  Barriers to discharge: Impaired mobility and Ongoing medical needs    Assessment:     Landon Aragon is a 74 y.o. male admitted with a medical diagnosis of penile bleeding. He presents with the following impairments/functional limitations: weakness, impaired endurance, impaired functional mobility, impaired self care skills, gait instability, impaired balance, decreased lower extremity function, decreased safety awareness.    Rehab Prognosis: Good; patient would benefit from acute skilled PT services to address these deficits and reach maximum level of function.    Recent Surgery: Procedure(s) (LRB):  FULGURATION, BLADDER (N/A)  EVACUATION, HEMATOMA (N/A) 4 Days Post-Op    Plan:     During this hospitalization, patient would benefit from acute PT services 5 x/week to address the identified rehab impairments via therapeutic activities, therapeutic exercises, neuromuscular re-education, wheelchair management/training and progress toward the following goals:    Plan of Care Expires:  12/22/24    Subjective     Chief Complaint: pain  Patient/Family Comments/goals: none  Pain/Comfort:  Pain Rating 1: 0/10      Objective:     Communicated with nurse prior to session.  Patient found supine with telemetry, heath catheter, peripheral IV, oxygen upon PT entry to room.     General Precautions: Standard, fall  Orthopedic Precautions: N/A  Braces: N/A  Respiratory Status: Nasal cannula, flow 3 L/min  Skin Integrity: Visible skin intact      Functional Mobility:  Bed Mobility:  Supine to Sit: moderate assistance  Sit to Supine:Stand-by assistance  Transfers:    Bed to Chair: minimum assistance and moderate assistance with slide board    Chair to Bed: moderate assistance with slide board  Balance:  fair  Wheelchair Propulsion:  Pt propelled Standard wheelchair x 125 feet on Level tile with  Bilateral upper extremity with Minimal Assistance.       Patient left up in chair for ~2 hours. He was transferred back to bed via SB with MOD A.     Education Provided:  Role and goals of PT, transfer training, bed mobility, WC training, balance training, safety awareness, assistive device, strengthening exercises, and importance of participating in PT to return to PLOF.    Patient left supine with all lines intact, call button in reach, and student nurse present    GOALS:   Multidisciplinary Problems       Physical Therapy Goals          Problem: Physical Therapy    Goal Priority Disciplines Outcome Interventions   Physical Therapy Goal     PT, PT/OT Progressing    Description: Goals to be met by: 24     Patient will increase functional independence with mobility by performin. Supine to sit with Set-up Morrow  2. Sit to supine with Set-up Morrow  3. Rolling to Left and Right with Morrow.  4. Bed to chair transfer with Stand-by Assistance using LRAD  5. Wheelchair propulsion x200 feet with Morrow using bilateral uppper extremities                         Time Tracking:     PT Received On: 24  PT Start Time 1st session: 0930     PT Stop Time 1st session: 1000  PT Start Time 2nd session: 1157     PT Stop Time 2nd session: 1210  PT Total Time (min): 43 min     Billable Minutes: Therapeutic Activity 43 minutes    Treatment Type: Treatment  PT/PTA: PT     Number of PTA visits since last PT visit: 2     2024

## 2024-11-28 LAB
POCT GLUCOSE: 128 MG/DL (ref 70–110)
POCT GLUCOSE: 167 MG/DL (ref 70–110)
POCT GLUCOSE: 190 MG/DL (ref 70–110)
POCT GLUCOSE: 198 MG/DL (ref 70–110)
POCT GLUCOSE: 33 MG/DL (ref 70–110)

## 2024-11-28 PROCEDURE — 25000003 PHARM REV CODE 250: Performed by: INTERNAL MEDICINE

## 2024-11-28 PROCEDURE — 11000001 HC ACUTE MED/SURG PRIVATE ROOM

## 2024-11-28 PROCEDURE — 99900035 HC TECH TIME PER 15 MIN (STAT)

## 2024-11-28 PROCEDURE — 25000003 PHARM REV CODE 250: Performed by: NURSE PRACTITIONER

## 2024-11-28 PROCEDURE — 63600175 PHARM REV CODE 636 W HCPCS: Performed by: INTERNAL MEDICINE

## 2024-11-28 PROCEDURE — 94799 UNLISTED PULMONARY SVC/PX: CPT

## 2024-11-28 PROCEDURE — 25000003 PHARM REV CODE 250: Performed by: SPECIALIST

## 2024-11-28 PROCEDURE — 27000221 HC OXYGEN, UP TO 24 HOURS

## 2024-11-28 PROCEDURE — 94760 N-INVAS EAR/PLS OXIMETRY 1: CPT

## 2024-11-28 RX ORDER — NITROFURANTOIN 25; 75 MG/1; MG/1
100 CAPSULE ORAL EVERY 12 HOURS
Status: DISCONTINUED | OUTPATIENT
Start: 2024-11-28 | End: 2024-11-29 | Stop reason: HOSPADM

## 2024-11-28 RX ADMIN — APIXABAN 5 MG: 5 TABLET, FILM COATED ORAL at 08:11

## 2024-11-28 RX ADMIN — TAMSULOSIN HYDROCHLORIDE 0.4 MG: 0.4 CAPSULE ORAL at 09:11

## 2024-11-28 RX ADMIN — PREGABALIN 50 MG: 50 CAPSULE ORAL at 09:11

## 2024-11-28 RX ADMIN — PREGABALIN 50 MG: 50 CAPSULE ORAL at 08:11

## 2024-11-28 RX ADMIN — HYDROCODONE BITARTRATE AND ACETAMINOPHEN 1 TABLET: 10; 325 TABLET ORAL at 02:11

## 2024-11-28 RX ADMIN — AMIODARONE HYDROCHLORIDE 100 MG: 100 TABLET ORAL at 09:11

## 2024-11-28 RX ADMIN — INSULIN GLARGINE 40 UNITS: 100 INJECTION, SOLUTION SUBCUTANEOUS at 09:11

## 2024-11-28 RX ADMIN — HYDROCODONE BITARTRATE AND ACETAMINOPHEN 1 TABLET: 10; 325 TABLET ORAL at 09:11

## 2024-11-28 RX ADMIN — LEVOTHYROXINE SODIUM 150 MCG: 150 TABLET ORAL at 06:11

## 2024-11-28 RX ADMIN — PIPERACILLIN AND TAZOBACTAM 4.5 G: 4; .5 INJECTION, POWDER, LYOPHILIZED, FOR SOLUTION INTRAVENOUS; PARENTERAL at 10:11

## 2024-11-28 RX ADMIN — DOCUSATE SODIUM 100 MG: 100 CAPSULE, LIQUID FILLED ORAL at 09:11

## 2024-11-28 RX ADMIN — FERROUS SULFATE TAB 325 MG (65 MG ELEMENTAL FE) 1 EACH: 325 (65 FE) TAB at 09:11

## 2024-11-28 RX ADMIN — NITROFURANTOIN MONOHYDRATE/MACROCRYSTALS 100 MG: 25; 75 CAPSULE ORAL at 01:11

## 2024-11-28 RX ADMIN — HYDROCODONE BITARTRATE AND ACETAMINOPHEN 1 TABLET: 10; 325 TABLET ORAL at 07:11

## 2024-11-28 RX ADMIN — PREGABALIN 50 MG: 50 CAPSULE ORAL at 02:11

## 2024-11-28 RX ADMIN — PIPERACILLIN AND TAZOBACTAM 4.5 G: 4; .5 INJECTION, POWDER, LYOPHILIZED, FOR SOLUTION INTRAVENOUS; PARENTERAL at 03:11

## 2024-11-28 RX ADMIN — APIXABAN 5 MG: 5 TABLET, FILM COATED ORAL at 09:11

## 2024-11-28 RX ADMIN — DOCUSATE SODIUM 100 MG: 100 CAPSULE, LIQUID FILLED ORAL at 08:11

## 2024-11-28 RX ADMIN — FLUTICASONE PROPIONATE 100 MCG: 50 SPRAY, METERED NASAL at 09:11

## 2024-11-28 RX ADMIN — WHITE PETROLATUM: 1.75 OINTMENT TOPICAL at 08:11

## 2024-11-28 RX ADMIN — ATORVASTATIN CALCIUM 40 MG: 40 TABLET, FILM COATED ORAL at 09:11

## 2024-11-28 RX ADMIN — NITROFURANTOIN MONOHYDRATE/MACROCRYSTALS 100 MG: 25; 75 CAPSULE ORAL at 08:11

## 2024-11-28 RX ADMIN — FAMOTIDINE 20 MG: 20 TABLET, FILM COATED ORAL at 09:11

## 2024-11-28 RX ADMIN — DEXTROSE MONOHYDRATE 250 ML: 100 INJECTION, SOLUTION INTRAVENOUS at 05:11

## 2024-11-28 RX ADMIN — WHITE PETROLATUM: 1.75 OINTMENT TOPICAL at 09:11

## 2024-11-28 NOTE — PROGRESS NOTES
[facility] Hospital Medicine Progress Note        Chief Complaint: Inpatient Follow-up for hematuria following Heath exchange    HPI:   74-year-old male with known past medical history of congestive heart failure, diabetes mellitus type 2, right AKA and left BKA due to diabetic neuropathy, hard of hearing, hyperlipidemia, hypertension, peripheral arterial disease, currently at [facility] admitted 11/21/2024 for hematuria/penile bleeding after Heath was exchanged yesterday at [facility], concern for traumatic injury.  In the ED patient had catheter placed and CBI started.  Urology consulted.  Patient has no complaints currently, denies any pain.  Urine in Heath bag is still red.  Informed patient that urology has been consulted and they will evaluate him soon.  Verbalized understanding  Did informed patient that will be will hold off his aspirin and Eliquis for now and resume other home medication.  Labs reviewed, WBC 15 K, H&H 8.0/25.4, platelets 372, no left shift  PT INR 17.6/1.4  BMP shows sodium of 133, BUN creatinine 46.2/1.8, glucose 311, , AST/ALT within normal range    Interval Hx:   Eliquis was resumed yesterday evening, and patient has had no further hematuria.  He denies any current complaints, and remains afebrile.      Objective/physical exam:  General: In no acute distress, afebrile  Chest: Clear to auscultation bilaterally  Heart: RRR, +S1, S2, no appreciable murmur  Abdomen: Soft, nontender, BS +, hyperkeratotic skin abdomen with dressing   MSK: Warm, Right AKA, left BKA   : Gross hematuria in heath bag  Neurologic: Alert and oriented x4, Cranial nerve II-XII intact, able to move all 4 extremities    VITAL SIGNS: 24 HRS MIN & MAX LAST   Temp  Min: 98.3 °F (36.8 °C)  Max: 98.8 °F (37.1 °C) 98.6 °F (37 °C)   BP  Min: 111/50  Max: 183/77 (!) 115/59   Pulse  Min: 62  Max: 94  72   Resp  Min: 16  Max: 18 18   SpO2  Min: 97 %  Max: 100 % 100 %         LABS:  Recent  Labs   Lab 11/25/24 0434 11/26/24 0453 11/27/24 0435   WBC 13.26* 8.32 9.24   RBC 2.80* 3.11* 3.24*   HGB 7.6* 8.4* 8.9*   HCT 23.3* 26.2* 26.7*   MCV 83.2 84.2 82.4   MCH 27.1 27.0 27.5   MCHC 32.6* 32.1* 33.3   RDW 16.3 16.0 15.9    193 205   MPV 10.1 10.7* 10.7*     Recent Labs   Lab 11/21/24  1051 11/22/24  0556 11/23/24  0453 11/24/24  0558 11/25/24 0434 11/26/24 0453   * 135*   < > 139 137 139   K 4.8 4.6   < > 5.2* 4.2 4.1    102   < > 103 104 103   CO2 26 23   < > 27 24 26   BUN 46.2* 39.2*   < > 33.4* 29.3* 18.3   CREATININE 1.88* 1.31*   < > 1.19 0.85 0.83   CALCIUM 8.4* 8.5*   < > 9.0 8.4* 8.2*   MG  --  2.10  --   --   --   --    ALBUMIN 2.5* 2.5*  --   --   --   --    ALKPHOS 169* 158*  --   --   --   --    ALT 18 17  --   --   --   --    AST 15 16  --   --   --   --    BILITOT 0.5 0.4  --   --   --   --     < > = values in this interval not displayed.     Microbiology Results (last 7 days)       Procedure Component Value Units Date/Time    Urine culture [[id number]]  (Abnormal)  (Susceptibility) Collected: 11/25/24 1242    Order Status: Completed Specimen: Urine Updated: 11/27/24 0658     Urine Culture >/= 100,000 colonies/ml Escherichia coli ESBL               Assessment:  Gross Hematuria secondary to traumatic Sharp exchange s/p cystoscopy with clot evacuation and fulguration  Acute blood loss anemia due to above  ESBL Ecoli UTI vs colonization  Paroxysmal Afib on long-term oral anticoagulation  Diabetes mellitus type II with hyperglycemia  Essential Hypertension  CKD IIIB  Hypothyroidism  Diabetic neuropathy  PAD s/p prior right AKA and left BKA  h/o CAD, ischemic CMO, with recovered to EF 50-55%  Morbid obesity with BMI >50  Hyperkalemia, resolved  Hyperkeratosis abdominal wall, chronic   Pressure wounds: Stage III left medial buttocks, Stage II right buttocks    Plan:  Continue with Eliquis and monitoring for further hematuria; will repeat CBC in AM   Can discontinue IV  fluids and encourage oral hydration   Will discontinue Zosyn, transitioned to oral antibiotics with Macrobid for his ESBL  Otherwise continue current management and monitoring       VTE Prophylaxis:  AC as above       Disposition:  Plan for discharge back [facility] once able to accept, assuming no further hematuria          Brice Duke MD  [facility]  11/28/2024

## 2024-11-29 VITALS
WEIGHT: 315 LBS | RESPIRATION RATE: 18 BRPM | OXYGEN SATURATION: 97 % | SYSTOLIC BLOOD PRESSURE: 144 MMHG | HEIGHT: 70 IN | DIASTOLIC BLOOD PRESSURE: 59 MMHG | TEMPERATURE: 99 F | BODY MASS INDEX: 45.1 KG/M2 | HEART RATE: 69 BPM

## 2024-11-29 PROBLEM — R31.9 HEMATURIA: Status: ACTIVE | Noted: 2024-11-29

## 2024-11-29 LAB
ANION GAP SERPL CALC-SCNC: 9 MEQ/L
BUN SERPL-MCNC: 19.4 MG/DL (ref 8.4–25.7)
CALCIUM SERPL-MCNC: 8.2 MG/DL (ref 8.8–10)
CHLORIDE SERPL-SCNC: 102 MMOL/L (ref 98–107)
CO2 SERPL-SCNC: 26 MMOL/L (ref 23–31)
CREAT SERPL-MCNC: 0.83 MG/DL (ref 0.72–1.25)
CREAT/UREA NIT SERPL: 23
ERYTHROCYTE [DISTWIDTH] IN BLOOD BY AUTOMATED COUNT: 15.7 % (ref 11.5–17)
GFR SERPLBLD CREATININE-BSD FMLA CKD-EPI: >60 ML/MIN/1.73/M2
GLUCOSE SERPL-MCNC: 62 MG/DL (ref 82–115)
HCT VFR BLD AUTO: 26.4 % (ref 42–52)
HGB BLD-MCNC: 8.4 G/DL (ref 14–18)
MCH RBC QN AUTO: 26.9 PG (ref 27–31)
MCHC RBC AUTO-ENTMCNC: 31.8 G/DL (ref 33–36)
MCV RBC AUTO: 84.6 FL (ref 80–94)
NRBC BLD AUTO-RTO: 0 %
PLATELET # BLD AUTO: 246 X10(3)/MCL (ref 130–400)
PMV BLD AUTO: 10 FL (ref 7.4–10.4)
POCT GLUCOSE: 108 MG/DL (ref 70–110)
POCT GLUCOSE: 190 MG/DL (ref 70–110)
POCT GLUCOSE: 66 MG/DL (ref 70–110)
POTASSIUM SERPL-SCNC: 3.8 MMOL/L (ref 3.5–5.1)
RBC # BLD AUTO: 3.12 X10(6)/MCL (ref 4.7–6.1)
SODIUM SERPL-SCNC: 137 MMOL/L (ref 136–145)
WBC # BLD AUTO: 11.91 X10(3)/MCL (ref 4.5–11.5)

## 2024-11-29 PROCEDURE — 80048 BASIC METABOLIC PNL TOTAL CA: CPT | Performed by: INTERNAL MEDICINE

## 2024-11-29 PROCEDURE — 25000003 PHARM REV CODE 250: Performed by: SPECIALIST

## 2024-11-29 PROCEDURE — 36415 COLL VENOUS BLD VENIPUNCTURE: CPT | Performed by: INTERNAL MEDICINE

## 2024-11-29 PROCEDURE — 85027 COMPLETE CBC AUTOMATED: CPT | Performed by: INTERNAL MEDICINE

## 2024-11-29 PROCEDURE — 63600175 PHARM REV CODE 636 W HCPCS: Performed by: INTERNAL MEDICINE

## 2024-11-29 PROCEDURE — 25000003 PHARM REV CODE 250: Performed by: INTERNAL MEDICINE

## 2024-11-29 RX ORDER — HYOSCYAMINE SULFATE 0.12 MG/1
0.12 TABLET SUBLINGUAL EVERY 6 HOURS PRN
Qty: 30 TABLET | Refills: 0 | Status: SHIPPED | OUTPATIENT
Start: 2024-11-29

## 2024-11-29 RX ORDER — INSULIN GLARGINE 100 [IU]/ML
35 INJECTION, SOLUTION SUBCUTANEOUS 2 TIMES DAILY
Qty: 10 ML | Refills: 0 | Status: SHIPPED | OUTPATIENT
Start: 2024-11-29

## 2024-11-29 RX ORDER — NITROFURANTOIN 25; 75 MG/1; MG/1
100 CAPSULE ORAL EVERY 12 HOURS
Qty: 20 CAPSULE | Refills: 0 | Status: SHIPPED | OUTPATIENT
Start: 2024-11-29 | End: 2024-12-09

## 2024-11-29 RX ADMIN — AMIODARONE HYDROCHLORIDE 100 MG: 100 TABLET ORAL at 08:11

## 2024-11-29 RX ADMIN — FAMOTIDINE 20 MG: 20 TABLET, FILM COATED ORAL at 08:11

## 2024-11-29 RX ADMIN — DOCUSATE SODIUM 100 MG: 100 CAPSULE, LIQUID FILLED ORAL at 08:11

## 2024-11-29 RX ADMIN — LEVOTHYROXINE SODIUM 150 MCG: 150 TABLET ORAL at 05:11

## 2024-11-29 RX ADMIN — NITROFURANTOIN MONOHYDRATE/MACROCRYSTALS 100 MG: 25; 75 CAPSULE ORAL at 08:11

## 2024-11-29 RX ADMIN — INSULIN ASPART 2 UNITS: 100 INJECTION, SOLUTION INTRAVENOUS; SUBCUTANEOUS at 11:11

## 2024-11-29 RX ADMIN — HYDROCODONE BITARTRATE AND ACETAMINOPHEN 1 TABLET: 10; 325 TABLET ORAL at 01:11

## 2024-11-29 RX ADMIN — ATORVASTATIN CALCIUM 40 MG: 40 TABLET, FILM COATED ORAL at 08:11

## 2024-11-29 RX ADMIN — FERROUS SULFATE TAB 325 MG (65 MG ELEMENTAL FE) 1 EACH: 325 (65 FE) TAB at 08:11

## 2024-11-29 RX ADMIN — INSULIN GLARGINE 40 UNITS: 100 INJECTION, SOLUTION SUBCUTANEOUS at 06:11

## 2024-11-29 RX ADMIN — HYDROCODONE BITARTRATE AND ACETAMINOPHEN 1 TABLET: 10; 325 TABLET ORAL at 08:11

## 2024-11-29 RX ADMIN — APIXABAN 5 MG: 5 TABLET, FILM COATED ORAL at 08:11

## 2024-11-29 RX ADMIN — TAMSULOSIN HYDROCHLORIDE 0.4 MG: 0.4 CAPSULE ORAL at 08:11

## 2024-11-29 RX ADMIN — PREGABALIN 50 MG: 50 CAPSULE ORAL at 08:11

## 2024-11-29 NOTE — PLAN OF CARE
11/29/24 1140   Final Note   Assessment Type Final Discharge Note   Anticipated Discharge Disposition SNF   Post-Acute Status   Post-Acute Authorization Placement   Discharge Delays None known at this time     Pt being discharged back to [facility] via van transport. [facility] notified.

## 2024-11-29 NOTE — NURSING
DC Note: Gave report to Radha at [facility].  All dc information given and all questions answered.  NAD noted.  FU appt given.  Nursing home will get prescribed medications.

## 2024-11-29 NOTE — PLAN OF CARE
Problem: Infection  Goal: Absence of Infection Signs and Symptoms  11/29/2024 1314 by Fidelina Man RN  Outcome: Met  11/29/2024 1107 by Fidelina Man RN  Outcome: Progressing     Problem: Adult Inpatient Plan of Care  Goal: Plan of Care Review  11/29/2024 1314 by Fidelina Man RN  Outcome: Met  11/29/2024 1107 by Fidelina Man RN  Outcome: Progressing  Goal: Patient-Specific Goal (Individualized)  11/29/2024 1314 by Fidelina Man RN  Outcome: Met  11/29/2024 1107 by Fidelina Man RN  Outcome: Progressing  Goal: Absence of Hospital-Acquired Illness or Injury  11/29/2024 1314 by Fidelina Man RN  Outcome: Met  11/29/2024 1107 by Fidelina Man RN  Outcome: Progressing  Goal: Optimal Comfort and Wellbeing  11/29/2024 1314 by Fidelina Man RN  Outcome: Met  11/29/2024 1107 by Fidelina Man RN  Outcome: Progressing  Goal: Readiness for Transition of Care  11/29/2024 1314 by Fidelina Man RN  Outcome: Met  11/29/2024 1107 by Fidelina Man RN  Outcome: Progressing     Problem: Skin Injury Risk Increased  Goal: Skin Health and Integrity  11/29/2024 1314 by Fidelina Man RN  Outcome: Met  11/29/2024 1107 by Fidelina Man RN  Outcome: Progressing     Problem: Wound  Goal: Optimal Coping  11/29/2024 1314 by Fidelina Man RN  Outcome: Met  11/29/2024 1107 by Fidelina Man RN  Outcome: Progressing  Goal: Optimal Functional Ability  11/29/2024 1314 by Fidelina Man RN  Outcome: Met  11/29/2024 1107 by Fidelina Man RN  Outcome: Progressing  Goal: Absence of Infection Signs and Symptoms  11/29/2024 1314 by Fidelina Man RN  Outcome: Met  11/29/2024 1107 by Fidelina Man RN  Outcome: Progressing  Goal: Improved Oral Intake  11/29/2024 1314 by Fidelina Man RN  Outcome: Met  11/29/2024 1107 by Fidelina Man, RN  Outcome: Progressing  Goal: Optimal Pain Control and Function  11/29/2024 1314 by Fidelina Man, RN  Outcome:  Met  11/29/2024 1107 by Fidelina Man, RN  Outcome: Progressing  Goal: Skin Health and Integrity  11/29/2024 1314 by Fidelina Man RN  Outcome: Met  11/29/2024 1107 by Fidelina Man RN  Outcome: Progressing  Goal: Optimal Wound Healing  11/29/2024 1314 by Fidelina Man RN  Outcome: Met  11/29/2024 1107 by Fidelina Man RN  Outcome: Progressing     Problem: Bariatric Environmental Safety  Goal: Safety Maintained with Care  11/29/2024 1314 by Fidelina Man RN  Outcome: Met  11/29/2024 1107 by iFdelina Man RN  Outcome: Progressing     Problem: Fall Injury Risk  Goal: Absence of Fall and Fall-Related Injury  11/29/2024 1314 by Fidelina Man, RN  Outcome: Met  11/29/2024 1107 by Fidelina Man RN  Outcome: Progressing

## 2024-11-29 NOTE — DISCHARGE SUMMARY
[facility] Hospital Medicine Discharge Summary    Admit Date: 11/21/2024  Discharge Date and Time: 11/29/202411:28 AM  Admitting Physician:  Team  Discharging Physician: Oskar Carrillo MD.  Primary Care Physician: Mat Taylor FNP  Consults: Urology    Discharge Diagnoses:  Gross Hematuria secondary to traumatic Sharp exchange s/p cystoscopy with clot evacuation and fulguration 11/23/2024  Acute blood loss anemia due to above  ESBL Ecoli UTI vs colonization  Paroxysmal Afib on long-term oral anticoagulation  Diabetes mellitus type II with hyperglycemia  Essential Hypertension  CKD IIIB  Hypothyroidism  Diabetic neuropathy  PAD s/p prior right AKA and left BKA  h/o CAD, ischemic CMO, with recovered to EF 50-55%  Morbid obesity with BMI >50  Hyperkalemia, resolved  Hyperkeratosis abdominal wall, chronic   Pressure wounds: Stage III left medial buttocks, Stage II right buttocks    Hospital Course:   74-year-old male with known past medical history of congestive heart failure, diabetes mellitus type 2, right AKA and left BKA due to diabetic neuropathy, hard of hearing, hyperlipidemia, hypertension, peripheral arterial disease, currently at [facility] admitted 11/21/2024 for hematuria/penile bleeding after Sharp was exchanged yesterday at [facility], concern for traumatic injury.  Labs  WBC 15 K, H&H 8.0/25.4, platelets 372, no left shift,PT INR 17.6/1.4,BMP shows sodium of 133, BUN creatinine 46.2/1.8, glucose 311, , AST/ALT within normal range patient had catheter placed and CBI started.  Urology consulted.  Patient underwent cystoscopy with clot evacuation and fulguration, hematuria monitored, urology team cleared to resume blood thinners, patient able to tolerate , urology team cleared patient to discharge, recommended to keep Sharp in place and irrigation port plugged recommended follow up outpatient for void trial with Dr. Farrell.  Urine cultures resulted ESBL E coli  sensitive to  nitrofurantoin.  Patient received PRBC transfusion hemoglobin improved, stabilized    Pt was seen and examined on the day of discharge, stable, voiced no complaints, discharge back to [facility], discussed follow-up with Urology, patient verbalized understanding and agreement with discharge plan, prescription sent.  Vitals:  VITAL SIGNS: 24 HRS MIN & MAX LAST   Temp  Min: 97.6 °F (36.4 °C)  Max: 99.2 °F (37.3 °C) 98.5 °F (36.9 °C)   BP  Min: 121/55  Max: 166/71 (!) 144/59   Pulse  Min: 66  Max: 81  69   Resp  Min: 16  Max: 18 16   SpO2  Min: 97 %  Max: 100 % 97 %       Physical Exam:  General: In no acute distress, afebrile  Chest:  Unlabored breathing  Heart:  Normal rate  Abdomen: Soft, nontender  MSK: Right AKA, left BKA   :  Clear urine in heath bag  Neurologic: Alert and answering appropriately    Procedures Performed: s/p cystoscopy with clot evacuation and fulguration      Significant Diagnostic Studies: See Full reports for all details    Recent Labs   Lab 11/26/24 0453 11/27/24  0435 11/29/24  0431   WBC 8.32 9.24 11.91*   RBC 3.11* 3.24* 3.12*   HGB 8.4* 8.9* 8.4*   HCT 26.2* 26.7* 26.4*   MCV 84.2 82.4 84.6   MCH 27.0 27.5 26.9*   MCHC 32.1* 33.3 31.8*   RDW 16.0 15.9 15.7    205 246   MPV 10.7* 10.7* 10.0       Recent Labs   Lab 11/25/24  0434 11/26/24 0453 11/29/24  0430    139 137   K 4.2 4.1 3.8    103 102   CO2 24 26 26   BUN 29.3* 18.3 19.4   CREATININE 0.85 0.83 0.83   CALCIUM 8.4* 8.2* 8.2*        Microbiology Results (last 7 days)       Procedure Component Value Units Date/Time    Urine culture [[id number]]  (Abnormal)  (Susceptibility) Collected: 11/25/24 1242    Order Status: Completed Specimen: Urine Updated: 11/27/24 0658     Urine Culture >/= 100,000 colonies/ml Escherichia coli ESBL             X-Ray Chest PA Lateral With Lordotic Vie  Indication: Preoperative respiratory evaluation     Findings: No prior studies are available for  comparison. Heart size is  normal and lungs are clear bilaterally. Pulmonary vasculature is  normal.     IMPRESSION: No evidence of acute disease.        Electronically Signed By: Boone Burdick MD  Date/Time Signed: 08/13/2018 16:44         Medication List        START taking these medications      hyoscyamine 0.125 mg Subl  Place 1 tablet (0.125 mg total) under the tongue every 6 (six) hours as needed (bladder spasms).     LANTUS U-100 INSULIN 100 unit/mL injection  Generic drug: insulin glargine U-100 (Lantus)  Inject 35 Units into the skin 2 (two) times a day.  Replaces: LANTUS SUBQ     nitrofurantoin (macrocrystal-monohydrate) 100 MG capsule  Commonly known as: MACROBID  Take 1 capsule (100 mg total) by mouth every 12 (twelve) hours. for 10 days            CHANGE how you take these medications      pregabalin 75 MG capsule  Commonly known as: LYRICA  What changed: Another medication with the same name was removed. Continue taking this medication, and follow the directions you see here.            CONTINUE taking these medications      amiodarone 100 MG Tab  Commonly known as: PACERONE     amLODIPine 5 MG tablet  Commonly known as: NORVASC     apixaban 5 mg Tab  Commonly known as: ELIQUIS     aspirin 81 MG Chew     docusate sodium 100 MG capsule  Commonly known as: COLACE     ENTRESTO 24-26 mg per tablet  Generic drug: sacubitriL-valsartan     famotidine 20 MG tablet  Commonly known as: PEPCID     ferrous gluconate 324 MG tablet  Commonly known as: FERGON     fluticasone propionate 50 mcg/actuation nasal spray  Commonly known as: FLONASE     * furosemide 40 MG tablet  Commonly known as: LASIX     * furosemide 80 MG tablet  Commonly known as: LASIX     HYDROcodone-acetaminophen 5-325 mg per tablet  Commonly known as: NORCO  Take 1 tablet by mouth every 6 (six) hours as needed for Pain.     levothyroxine 75 MCG tablet  Commonly known as: SYNTHROID     metoprolol succinate 25 mg Cspx     potassium chloride  10 MEQ Cpsr  Commonly known as: MICRO-K     rosuvastatin 40 MG Tab  Commonly known as: CRESTOR     SENNA WITH DOCUSATE SODIUM 8.6-50 mg per tablet  Generic drug: senna-docusate 8.6-50 mg     tamsulosin 0.4 mg Cap  Commonly known as: FLOMAX           * This list has 2 medication(s) that are the same as other medications prescribed for you. Read the directions carefully, and ask your doctor or other care provider to review them with you.                STOP taking these medications      atorvastatin 40 MG tablet  Commonly known as: LIPITOR     cephALEXin 500 MG capsule  Commonly known as: KEFLEX     insulin lispro 100 unit/mL injection     LANTUS SUBQ  Replaced by: LANTUS U-100 INSULIN 100 unit/mL injection     lisinopriL 5 MG tablet  Commonly known as: PRINIVIL,ZESTRIL     predniSONE 10 MG tablet  Commonly known as: DELTASONE               Where to Get Your Medications        These medications were sent to [facility] - [address] [address]      Phone: [phone]   hyoscyamine 0.125 mg Subl  LANTUS U-100 INSULIN 100 unit/mL injection  nitrofurantoin (macrocrystal-monohydrate) 100 MG capsule          Explained in detail to the patient about the discharge plan, medications, and follow-up visits. Pt understands and agrees with the treatment plan  Discharge Disposition:  discharged back to [facility]   Discharged Condition: stable  Diet-   Dietary Orders (From admission, onward)       Start     Ordered    11/27/24 1251  Dietary nutrition supplements BID; Juan - Any flavor  Continuous        Question Answer Comment   Frequency: BID    Select PO Supplement: Juan - Any flavor        11/27/24 1250    11/23/24 1744  Diet diabetic Cardiac (Low Na/Chol); 2000 Calories (up to 75 gm per meal)  Diet effective now        Question Answer Comment   Diet Modifier: Cardiac (Low Na/Chol)    Total calories / carbs: 2000 Calories (up to 75 gm per meal)         11/23/24 1743                   Medications Per DC med rec  Activities as tolerated   Follow-up Information       Du Farrell MD Follow up.    Specialty: Urology  Why: void trial  Contact information:  [address]  [address]  [place] [address]  [phone]               Mat Taylor, [facility] Follow up.    Specialty: Family Medicine  Contact information:  [address] ,  [address]  [place] [address]  [phone]                           For further questions contact hospitalist office    Discharge time 33 minutes    For worsening symptoms, chest pain, shortness of breath, increased abdominal pain, high grade fever, stroke or stroke like symptoms, immediately go to the nearest Emergency Room or call 911 as soon as possible.      Oskar Hair M.D, on 11/29/2024. at 11:28 AM.

## 2024-12-01 ENCOUNTER — LAB REQUISITION (OUTPATIENT)
Dept: LAB | Facility: HOSPITAL | Age: 74
End: 2024-12-01
Payer: MEDICARE

## 2024-12-01 DIAGNOSIS — M62.58 MUSCLE WASTING AND ATROPHY, NOT ELSEWHERE CLASSIFIED, OTHER SITE: ICD-10-CM

## 2024-12-01 LAB
25(OH)D3+25(OH)D2 SERPL-MCNC: 20 NG/ML (ref 30–80)
ALBUMIN SERPL-MCNC: 2.1 G/DL (ref 3.4–4.8)
ALBUMIN/GLOB SERPL: 0.5 RATIO (ref 1.1–2)
ALP SERPL-CCNC: 122 UNIT/L (ref 40–150)
ALT SERPL-CCNC: 16 UNIT/L (ref 0–55)
ANION GAP SERPL CALC-SCNC: 10 MEQ/L
AST SERPL-CCNC: 19 UNIT/L (ref 5–34)
BASOPHILS # BLD AUTO: 0.04 X10(3)/MCL
BASOPHILS NFR BLD AUTO: 0.4 %
BILIRUB SERPL-MCNC: 0.3 MG/DL
BUN SERPL-MCNC: 24.5 MG/DL (ref 8.4–25.7)
CALCIUM SERPL-MCNC: 8.3 MG/DL (ref 8.8–10)
CHLORIDE SERPL-SCNC: 98 MMOL/L (ref 98–107)
CHOLEST SERPL-MCNC: 80 MG/DL
CHOLEST/HDLC SERPL: 3 {RATIO} (ref 0–5)
CO2 SERPL-SCNC: 30 MMOL/L (ref 23–31)
CREAT SERPL-MCNC: 1.07 MG/DL (ref 0.72–1.25)
CREAT/UREA NIT SERPL: 23
EOSINOPHIL # BLD AUTO: 0.58 X10(3)/MCL (ref 0–0.9)
EOSINOPHIL NFR BLD AUTO: 5.5 %
ERYTHROCYTE [DISTWIDTH] IN BLOOD BY AUTOMATED COUNT: 15.9 % (ref 11.5–17)
EST. AVERAGE GLUCOSE BLD GHB EST-MCNC: 134.1 MG/DL
FERRITIN SERPL-MCNC: 202.93 NG/ML (ref 21.81–274.66)
GFR SERPLBLD CREATININE-BSD FMLA CKD-EPI: >60 ML/MIN/1.73/M2
GLOBULIN SER-MCNC: 4.3 GM/DL (ref 2.4–3.5)
GLUCOSE SERPL-MCNC: 158 MG/DL (ref 82–115)
HBA1C MFR BLD: 6.3 %
HCT VFR BLD AUTO: 26.8 % (ref 42–52)
HDLC SERPL-MCNC: 26 MG/DL (ref 35–60)
HGB BLD-MCNC: 8.4 G/DL (ref 14–18)
IMM GRANULOCYTES # BLD AUTO: 0.13 X10(3)/MCL (ref 0–0.04)
IMM GRANULOCYTES NFR BLD AUTO: 1.2 %
IRON SATN MFR SERPL: 11 % (ref 20–50)
IRON SERPL-MCNC: 17 UG/DL (ref 65–175)
LDLC SERPL CALC-MCNC: 36 MG/DL (ref 50–140)
LYMPHOCYTES # BLD AUTO: 2.54 X10(3)/MCL (ref 0.6–4.6)
LYMPHOCYTES NFR BLD AUTO: 23.9 %
MCH RBC QN AUTO: 26.8 PG (ref 27–31)
MCHC RBC AUTO-ENTMCNC: 31.3 G/DL (ref 33–36)
MCV RBC AUTO: 85.6 FL (ref 80–94)
MONOCYTES # BLD AUTO: 1.18 X10(3)/MCL (ref 0.1–1.3)
MONOCYTES NFR BLD AUTO: 11.1 %
NEUTROPHILS # BLD AUTO: 6.16 X10(3)/MCL (ref 2.1–9.2)
NEUTROPHILS NFR BLD AUTO: 57.9 %
NRBC BLD AUTO-RTO: 0 %
PLATELET # BLD AUTO: 297 X10(3)/MCL (ref 130–400)
PMV BLD AUTO: 9.7 FL (ref 7.4–10.4)
POTASSIUM SERPL-SCNC: 3.9 MMOL/L (ref 3.5–5.1)
PREALB SERPL-MCNC: 10 MG/DL (ref 16–42)
PROT SERPL-MCNC: 6.4 GM/DL (ref 5.8–7.6)
RBC # BLD AUTO: 3.13 X10(6)/MCL (ref 4.7–6.1)
SODIUM SERPL-SCNC: 138 MMOL/L (ref 136–145)
TIBC SERPL-MCNC: 136 UG/DL (ref 60–240)
TIBC SERPL-MCNC: 153 UG/DL (ref 250–450)
TRANSFERRIN SERPL-MCNC: 143 MG/DL (ref 163–344)
TRIGL SERPL-MCNC: 88 MG/DL (ref 34–140)
TSH SERPL-ACNC: 4.99 UIU/ML (ref 0.35–4.94)
VLDLC SERPL CALC-MCNC: 18 MG/DL
WBC # BLD AUTO: 10.63 X10(3)/MCL (ref 4.5–11.5)

## 2024-12-01 PROCEDURE — 83036 HEMOGLOBIN GLYCOSYLATED A1C: CPT | Performed by: NURSE PRACTITIONER

## 2024-12-01 PROCEDURE — 82306 VITAMIN D 25 HYDROXY: CPT | Performed by: NURSE PRACTITIONER

## 2024-12-01 PROCEDURE — 80061 LIPID PANEL: CPT | Performed by: NURSE PRACTITIONER

## 2024-12-01 PROCEDURE — 84443 ASSAY THYROID STIM HORMONE: CPT | Performed by: NURSE PRACTITIONER

## 2024-12-01 PROCEDURE — 84134 ASSAY OF PREALBUMIN: CPT | Performed by: NURSE PRACTITIONER

## 2024-12-01 PROCEDURE — 85025 COMPLETE CBC W/AUTO DIFF WBC: CPT | Performed by: NURSE PRACTITIONER

## 2024-12-01 PROCEDURE — 83540 ASSAY OF IRON: CPT | Performed by: NURSE PRACTITIONER

## 2024-12-01 PROCEDURE — 82728 ASSAY OF FERRITIN: CPT | Performed by: NURSE PRACTITIONER

## 2024-12-01 PROCEDURE — 80053 COMPREHEN METABOLIC PANEL: CPT | Performed by: NURSE PRACTITIONER

## 2024-12-02 ENCOUNTER — HOSPITAL ENCOUNTER (INPATIENT)
Facility: HOSPITAL | Age: 74
LOS: 2 days | Discharge: HOME | DRG: 696 | End: 2024-12-05
Attending: STUDENT IN AN ORGANIZED HEALTH CARE EDUCATION/TRAINING PROGRAM | Admitting: HOSPITALIST
Payer: MEDICARE

## 2024-12-02 DIAGNOSIS — R07.9 CHEST PAIN: ICD-10-CM

## 2024-12-02 DIAGNOSIS — Z01.818 PREOP TESTING: ICD-10-CM

## 2024-12-02 DIAGNOSIS — R31.0 GROSS HEMATURIA: Primary | ICD-10-CM

## 2024-12-02 LAB
ALBUMIN SERPL-MCNC: 2.1 G/DL (ref 3.4–4.8)
ALBUMIN/GLOB SERPL: 0.4 RATIO (ref 1.1–2)
ALP SERPL-CCNC: 147 UNIT/L (ref 40–150)
ALT SERPL-CCNC: 26 UNIT/L (ref 0–55)
ANION GAP SERPL CALC-SCNC: 9 MEQ/L
APTT PPP: 28.5 SECONDS (ref 23.2–33.7)
AST SERPL-CCNC: 24 UNIT/L (ref 5–34)
BACTERIA #/AREA URNS AUTO: ABNORMAL /HPF
BASOPHILS # BLD AUTO: 0.05 X10(3)/MCL
BASOPHILS NFR BLD AUTO: 0.5 %
BILIRUB SERPL-MCNC: 0.3 MG/DL
BILIRUB UR QL STRIP.AUTO: ABNORMAL
BUN SERPL-MCNC: 32.6 MG/DL (ref 8.4–25.7)
CALCIUM SERPL-MCNC: 8.6 MG/DL (ref 8.8–10)
CHLORIDE SERPL-SCNC: 95 MMOL/L (ref 98–107)
CLARITY UR: ABNORMAL
CO2 SERPL-SCNC: 29 MMOL/L (ref 23–31)
COLOR UR AUTO: ABNORMAL
CREAT SERPL-MCNC: 1.43 MG/DL (ref 0.72–1.25)
CREAT/UREA NIT SERPL: 23
EOSINOPHIL # BLD AUTO: 0.45 X10(3)/MCL (ref 0–0.9)
EOSINOPHIL NFR BLD AUTO: 4.2 %
ERYTHROCYTE [DISTWIDTH] IN BLOOD BY AUTOMATED COUNT: 15.9 % (ref 11.5–17)
GFR SERPLBLD CREATININE-BSD FMLA CKD-EPI: 52 ML/MIN/1.73/M2
GLOBULIN SER-MCNC: 5.1 GM/DL (ref 2.4–3.5)
GLUCOSE SERPL-MCNC: 384 MG/DL (ref 82–115)
GLUCOSE UR QL STRIP: ABNORMAL
HCT VFR BLD AUTO: 25.4 % (ref 42–52)
HGB BLD-MCNC: 8.2 G/DL (ref 14–18)
HGB UR QL STRIP: ABNORMAL
IMM GRANULOCYTES # BLD AUTO: 0.15 X10(3)/MCL (ref 0–0.04)
IMM GRANULOCYTES NFR BLD AUTO: 1.4 %
INR PPP: 1.4
KETONES UR QL STRIP: ABNORMAL
LEUKOCYTE ESTERASE UR QL STRIP: ABNORMAL
LYMPHOCYTES # BLD AUTO: 2.35 X10(3)/MCL (ref 0.6–4.6)
LYMPHOCYTES NFR BLD AUTO: 21.9 %
MCH RBC QN AUTO: 26.8 PG (ref 27–31)
MCHC RBC AUTO-ENTMCNC: 32.3 G/DL (ref 33–36)
MCV RBC AUTO: 83 FL (ref 80–94)
MONOCYTES # BLD AUTO: 0.97 X10(3)/MCL (ref 0.1–1.3)
MONOCYTES NFR BLD AUTO: 9 %
NEUTROPHILS # BLD AUTO: 6.78 X10(3)/MCL (ref 2.1–9.2)
NEUTROPHILS NFR BLD AUTO: 63 %
NITRITE UR QL STRIP: ABNORMAL
NRBC BLD AUTO-RTO: 0 %
PH UR STRIP: 7.5 [PH]
PLATELET # BLD AUTO: 300 X10(3)/MCL (ref 130–400)
PMV BLD AUTO: 9.7 FL (ref 7.4–10.4)
POTASSIUM SERPL-SCNC: 4.1 MMOL/L (ref 3.5–5.1)
PROT SERPL-MCNC: 7.2 GM/DL (ref 5.8–7.6)
PROT UR QL STRIP: ABNORMAL
PROTHROMBIN TIME: 16.8 SECONDS (ref 12.5–14.5)
RBC # BLD AUTO: 3.06 X10(6)/MCL (ref 4.7–6.1)
RBC #/AREA URNS AUTO: >=100 /HPF
SODIUM SERPL-SCNC: 133 MMOL/L (ref 136–145)
SP GR UR STRIP.AUTO: 1.02 (ref 1–1.03)
SQUAMOUS #/AREA URNS LPF: ABNORMAL /HPF
UROBILINOGEN UR STRIP-ACNC: ABNORMAL
WBC # BLD AUTO: 10.75 X10(3)/MCL (ref 4.5–11.5)
WBC #/AREA URNS AUTO: ABNORMAL /HPF

## 2024-12-02 PROCEDURE — 85610 PROTHROMBIN TIME: CPT | Performed by: STUDENT IN AN ORGANIZED HEALTH CARE EDUCATION/TRAINING PROGRAM

## 2024-12-02 PROCEDURE — 85025 COMPLETE CBC W/AUTO DIFF WBC: CPT | Performed by: STUDENT IN AN ORGANIZED HEALTH CARE EDUCATION/TRAINING PROGRAM

## 2024-12-02 PROCEDURE — 85730 THROMBOPLASTIN TIME PARTIAL: CPT | Performed by: STUDENT IN AN ORGANIZED HEALTH CARE EDUCATION/TRAINING PROGRAM

## 2024-12-02 PROCEDURE — 80053 COMPREHEN METABOLIC PANEL: CPT | Performed by: STUDENT IN AN ORGANIZED HEALTH CARE EDUCATION/TRAINING PROGRAM

## 2024-12-02 PROCEDURE — 81001 URINALYSIS AUTO W/SCOPE: CPT | Performed by: STUDENT IN AN ORGANIZED HEALTH CARE EDUCATION/TRAINING PROGRAM

## 2024-12-02 PROCEDURE — 99285 EMERGENCY DEPT VISIT HI MDM: CPT

## 2024-12-02 NOTE — Clinical Note
Diagnosis: Gross hematuria [599.71.ICD-9-CM]   Future Attending Provider: JACKI HOLDER [[id number]]   Admit to which facility:: [facility] [[id number]]   Reason for IP Medical Treatment  (Clinical interventions that can only be accomplished in the IP setting? ) :: H&H trending, CBI, urology eval

## 2024-12-03 LAB
ALBUMIN SERPL-MCNC: 2.3 G/DL (ref 3.4–4.8)
ALBUMIN/GLOB SERPL: 0.5 RATIO (ref 1.1–2)
ALP SERPL-CCNC: 143 UNIT/L (ref 40–150)
ALT SERPL-CCNC: 23 UNIT/L (ref 0–55)
ANION GAP SERPL CALC-SCNC: 11 MEQ/L
AST SERPL-CCNC: 18 UNIT/L (ref 5–34)
BASOPHILS # BLD AUTO: 0.06 X10(3)/MCL
BASOPHILS NFR BLD AUTO: 0.6 %
BILIRUB SERPL-MCNC: 0.3 MG/DL
BUN SERPL-MCNC: 31.3 MG/DL (ref 8.4–25.7)
CALCIUM SERPL-MCNC: 8.1 MG/DL (ref 8.8–10)
CHLORIDE SERPL-SCNC: 96 MMOL/L (ref 98–107)
CO2 SERPL-SCNC: 29 MMOL/L (ref 23–31)
CREAT SERPL-MCNC: 1.31 MG/DL (ref 0.72–1.25)
CREAT/UREA NIT SERPL: 24
EOSINOPHIL # BLD AUTO: 0.46 X10(3)/MCL (ref 0–0.9)
EOSINOPHIL NFR BLD AUTO: 4.5 %
ERYTHROCYTE [DISTWIDTH] IN BLOOD BY AUTOMATED COUNT: 15.9 % (ref 11.5–17)
GFR SERPLBLD CREATININE-BSD FMLA CKD-EPI: 58 ML/MIN/1.73/M2
GLOBULIN SER-MCNC: 4.3 GM/DL (ref 2.4–3.5)
GLUCOSE SERPL-MCNC: 376 MG/DL (ref 82–115)
HCT VFR BLD AUTO: 25.2 % (ref 42–52)
HCT VFR BLD AUTO: 25.3 % (ref 42–52)
HCT VFR BLD AUTO: 28.1 % (ref 42–52)
HGB BLD-MCNC: 7.9 G/DL (ref 14–18)
HGB BLD-MCNC: 7.9 G/DL (ref 14–18)
HGB BLD-MCNC: 8.7 G/DL (ref 14–18)
IMM GRANULOCYTES # BLD AUTO: 0.17 X10(3)/MCL (ref 0–0.04)
IMM GRANULOCYTES NFR BLD AUTO: 1.7 %
LYMPHOCYTES # BLD AUTO: 2.19 X10(3)/MCL (ref 0.6–4.6)
LYMPHOCYTES NFR BLD AUTO: 21.6 %
MAGNESIUM SERPL-MCNC: 1.8 MG/DL (ref 1.6–2.6)
MCH RBC QN AUTO: 26.2 PG (ref 27–31)
MCHC RBC AUTO-ENTMCNC: 31 G/DL (ref 33–36)
MCV RBC AUTO: 84.6 FL (ref 80–94)
MONOCYTES # BLD AUTO: 1.04 X10(3)/MCL (ref 0.1–1.3)
MONOCYTES NFR BLD AUTO: 10.2 %
NEUTROPHILS # BLD AUTO: 6.24 X10(3)/MCL (ref 2.1–9.2)
NEUTROPHILS NFR BLD AUTO: 61.4 %
NRBC BLD AUTO-RTO: 0 %
OHS QRS DURATION: 124 MS
OHS QTC CALCULATION: 501 MS
PHOSPHATE SERPL-MCNC: 3.4 MG/DL (ref 2.3–4.7)
PLATELET # BLD AUTO: 341 X10(3)/MCL (ref 130–400)
PMV BLD AUTO: 9.9 FL (ref 7.4–10.4)
POCT GLUCOSE: 289 MG/DL (ref 70–110)
POCT GLUCOSE: 325 MG/DL (ref 70–110)
POCT GLUCOSE: 335 MG/DL (ref 70–110)
POCT GLUCOSE: 384 MG/DL (ref 70–110)
POCT GLUCOSE: 384 MG/DL (ref 70–110)
POTASSIUM SERPL-SCNC: 3.9 MMOL/L (ref 3.5–5.1)
PROT SERPL-MCNC: 6.6 GM/DL (ref 5.8–7.6)
RBC # BLD AUTO: 3.32 X10(6)/MCL (ref 4.7–6.1)
SODIUM SERPL-SCNC: 136 MMOL/L (ref 136–145)
WBC # BLD AUTO: 10.16 X10(3)/MCL (ref 4.5–11.5)

## 2024-12-03 PROCEDURE — 63600175 PHARM REV CODE 636 W HCPCS: Performed by: NURSE PRACTITIONER

## 2024-12-03 PROCEDURE — 93005 ELECTROCARDIOGRAM TRACING: CPT

## 2024-12-03 PROCEDURE — 85025 COMPLETE CBC W/AUTO DIFF WBC: CPT | Performed by: NURSE PRACTITIONER

## 2024-12-03 PROCEDURE — 25000003 PHARM REV CODE 250: Performed by: INTERNAL MEDICINE

## 2024-12-03 PROCEDURE — 93010 ELECTROCARDIOGRAM REPORT: CPT | Mod: ,,, | Performed by: STUDENT IN AN ORGANIZED HEALTH CARE EDUCATION/TRAINING PROGRAM

## 2024-12-03 PROCEDURE — 11000001 HC ACUTE MED/SURG PRIVATE ROOM

## 2024-12-03 PROCEDURE — 99900035 HC TECH TIME PER 15 MIN (STAT)

## 2024-12-03 PROCEDURE — 84100 ASSAY OF PHOSPHORUS: CPT | Performed by: NURSE PRACTITIONER

## 2024-12-03 PROCEDURE — 83735 ASSAY OF MAGNESIUM: CPT | Performed by: NURSE PRACTITIONER

## 2024-12-03 PROCEDURE — 36415 COLL VENOUS BLD VENIPUNCTURE: CPT | Performed by: NURSE PRACTITIONER

## 2024-12-03 PROCEDURE — 80053 COMPREHEN METABOLIC PANEL: CPT | Performed by: NURSE PRACTITIONER

## 2024-12-03 PROCEDURE — 85014 HEMATOCRIT: CPT | Performed by: HOSPITALIST

## 2024-12-03 PROCEDURE — 99900031 HC PATIENT EDUCATION (STAT)

## 2024-12-03 PROCEDURE — 27000221 HC OXYGEN, UP TO 24 HOURS

## 2024-12-03 PROCEDURE — 25000003 PHARM REV CODE 250: Performed by: HOSPITALIST

## 2024-12-03 PROCEDURE — 36415 COLL VENOUS BLD VENIPUNCTURE: CPT | Performed by: HOSPITALIST

## 2024-12-03 PROCEDURE — 94760 N-INVAS EAR/PLS OXIMETRY 1: CPT

## 2024-12-03 RX ORDER — TALC
6 POWDER (GRAM) TOPICAL NIGHTLY PRN
Status: DISCONTINUED | OUTPATIENT
Start: 2024-12-03 | End: 2024-12-05 | Stop reason: HOSPADM

## 2024-12-03 RX ORDER — GLUCAGON 1 MG
1 KIT INJECTION
Status: DISCONTINUED | OUTPATIENT
Start: 2024-12-03 | End: 2024-12-05 | Stop reason: HOSPADM

## 2024-12-03 RX ORDER — SODIUM CHLORIDE, SODIUM GLUCONATE, SODIUM ACETATE, POTASSIUM CHLORIDE AND MAGNESIUM CHLORIDE 30; 37; 368; 526; 502 MG/100ML; MG/100ML; MG/100ML; MG/100ML; MG/100ML
INJECTION, SOLUTION INTRAVENOUS CONTINUOUS
Status: DISCONTINUED | OUTPATIENT
Start: 2024-12-03 | End: 2024-12-03

## 2024-12-03 RX ORDER — NALOXONE HCL 0.4 MG/ML
0.02 VIAL (ML) INJECTION
Status: DISCONTINUED | OUTPATIENT
Start: 2024-12-03 | End: 2024-12-05 | Stop reason: HOSPADM

## 2024-12-03 RX ORDER — TAMSULOSIN HYDROCHLORIDE 0.4 MG/1
0.4 CAPSULE ORAL DAILY
Status: DISCONTINUED | OUTPATIENT
Start: 2024-12-03 | End: 2024-12-05 | Stop reason: HOSPADM

## 2024-12-03 RX ORDER — ONDANSETRON 4 MG/1
4 TABLET, ORALLY DISINTEGRATING ORAL ONCE
Status: DISCONTINUED | OUTPATIENT
Start: 2024-12-03 | End: 2024-12-03

## 2024-12-03 RX ORDER — PROCHLORPERAZINE EDISYLATE 5 MG/ML
5 INJECTION INTRAMUSCULAR; INTRAVENOUS EVERY 6 HOURS PRN
Status: DISCONTINUED | OUTPATIENT
Start: 2024-12-03 | End: 2024-12-05 | Stop reason: HOSPADM

## 2024-12-03 RX ORDER — FAMOTIDINE 20 MG/1
20 TABLET, FILM COATED ORAL DAILY
Status: DISCONTINUED | OUTPATIENT
Start: 2024-12-03 | End: 2024-12-05 | Stop reason: HOSPADM

## 2024-12-03 RX ORDER — HYDROCODONE BITARTRATE AND ACETAMINOPHEN 5; 325 MG/1; MG/1
1 TABLET ORAL EVERY 6 HOURS PRN
Status: DISCONTINUED | OUTPATIENT
Start: 2024-12-03 | End: 2024-12-05 | Stop reason: HOSPADM

## 2024-12-03 RX ORDER — IBUPROFEN 200 MG
24 TABLET ORAL
Status: DISCONTINUED | OUTPATIENT
Start: 2024-12-03 | End: 2024-12-05 | Stop reason: HOSPADM

## 2024-12-03 RX ORDER — METOPROLOL SUCCINATE 25 MG/1
25 TABLET, EXTENDED RELEASE ORAL DAILY
Status: DISCONTINUED | OUTPATIENT
Start: 2024-12-03 | End: 2024-12-05 | Stop reason: HOSPADM

## 2024-12-03 RX ORDER — IBUPROFEN 200 MG
16 TABLET ORAL
Status: DISCONTINUED | OUTPATIENT
Start: 2024-12-03 | End: 2024-12-05 | Stop reason: HOSPADM

## 2024-12-03 RX ORDER — PREGABALIN 50 MG/1
50 CAPSULE ORAL 2 TIMES DAILY
Status: DISCONTINUED | OUTPATIENT
Start: 2024-12-03 | End: 2024-12-05 | Stop reason: HOSPADM

## 2024-12-03 RX ORDER — LIDOCAINE HYDROCHLORIDE 10 MG/ML
1 INJECTION, SOLUTION EPIDURAL; INFILTRATION; INTRACAUDAL; PERINEURAL ONCE
Status: DISCONTINUED | OUTPATIENT
Start: 2024-12-03 | End: 2024-12-03

## 2024-12-03 RX ORDER — ACETAMINOPHEN 325 MG/1
650 TABLET ORAL EVERY 6 HOURS PRN
Status: DISCONTINUED | OUTPATIENT
Start: 2024-12-03 | End: 2024-12-05 | Stop reason: HOSPADM

## 2024-12-03 RX ORDER — POLYETHYLENE GLYCOL 3350 17 G/17G
17 POWDER, FOR SOLUTION ORAL 2 TIMES DAILY PRN
Status: DISCONTINUED | OUTPATIENT
Start: 2024-12-03 | End: 2024-12-05 | Stop reason: HOSPADM

## 2024-12-03 RX ORDER — INSULIN ASPART 100 [IU]/ML
0-10 INJECTION, SOLUTION INTRAVENOUS; SUBCUTANEOUS
Status: DISCONTINUED | OUTPATIENT
Start: 2024-12-03 | End: 2024-12-05 | Stop reason: HOSPADM

## 2024-12-03 RX ORDER — SIMETHICONE 80 MG
1 TABLET,CHEWABLE ORAL 4 TIMES DAILY PRN
Status: DISCONTINUED | OUTPATIENT
Start: 2024-12-03 | End: 2024-12-05 | Stop reason: HOSPADM

## 2024-12-03 RX ORDER — ALUMINUM HYDROXIDE, MAGNESIUM HYDROXIDE, AND SIMETHICONE 1200; 120; 1200 MG/30ML; MG/30ML; MG/30ML
30 SUSPENSION ORAL 4 TIMES DAILY PRN
Status: DISCONTINUED | OUTPATIENT
Start: 2024-12-03 | End: 2024-12-05 | Stop reason: HOSPADM

## 2024-12-03 RX ORDER — HYOSCYAMINE SULFATE 0.12 MG/1
0.12 TABLET SUBLINGUAL EVERY 4 HOURS PRN
Status: DISCONTINUED | OUTPATIENT
Start: 2024-12-03 | End: 2024-12-05 | Stop reason: HOSPADM

## 2024-12-03 RX ORDER — ONDANSETRON HYDROCHLORIDE 2 MG/ML
4 INJECTION, SOLUTION INTRAVENOUS EVERY 4 HOURS PRN
Status: DISCONTINUED | OUTPATIENT
Start: 2024-12-03 | End: 2024-12-05 | Stop reason: HOSPADM

## 2024-12-03 RX ORDER — AMIODARONE HYDROCHLORIDE 100 MG/1
100 TABLET ORAL DAILY
Status: DISCONTINUED | OUTPATIENT
Start: 2024-12-03 | End: 2024-12-05 | Stop reason: HOSPADM

## 2024-12-03 RX ORDER — ALUMINUM HYDROXIDE, MAGNESIUM HYDROXIDE, AND SIMETHICONE 2400; 240; 2400 MG/30ML; MG/30ML; MG/30ML
30 SUSPENSION ORAL EVERY 4 HOURS PRN
COMMUNITY

## 2024-12-03 RX ORDER — MIDAZOLAM HYDROCHLORIDE 2 MG/2ML
2 INJECTION, SOLUTION INTRAMUSCULAR; INTRAVENOUS ONCE AS NEEDED
Status: DISCONTINUED | OUTPATIENT
Start: 2024-12-03 | End: 2024-12-05 | Stop reason: HOSPADM

## 2024-12-03 RX ORDER — LEVOTHYROXINE SODIUM 150 UG/1
150 TABLET ORAL
Status: DISCONTINUED | OUTPATIENT
Start: 2024-12-03 | End: 2024-12-05 | Stop reason: HOSPADM

## 2024-12-03 RX ADMIN — HYDROCODONE BITARTRATE AND ACETAMINOPHEN 1 TABLET: 5; 325 TABLET ORAL at 09:12

## 2024-12-03 RX ADMIN — INSULIN ASPART 8 UNITS: 100 INJECTION, SOLUTION INTRAVENOUS; SUBCUTANEOUS at 11:12

## 2024-12-03 RX ADMIN — FAMOTIDINE 20 MG: 20 TABLET, FILM COATED ORAL at 10:12

## 2024-12-03 RX ADMIN — HYOSCYAMINE SULFATE 0.12 MG: 0.12 TABLET SUBLINGUAL at 10:12

## 2024-12-03 RX ADMIN — INSULIN ASPART 8 UNITS: 100 INJECTION, SOLUTION INTRAVENOUS; SUBCUTANEOUS at 09:12

## 2024-12-03 RX ADMIN — TAMSULOSIN HYDROCHLORIDE 0.4 MG: 0.4 CAPSULE ORAL at 10:12

## 2024-12-03 RX ADMIN — METOPROLOL SUCCINATE 25 MG: 25 TABLET, EXTENDED RELEASE ORAL at 11:12

## 2024-12-03 RX ADMIN — AMIODARONE HYDROCHLORIDE 100 MG: 100 TABLET ORAL at 11:12

## 2024-12-03 RX ADMIN — INSULIN ASPART 5 UNITS: 100 INJECTION, SOLUTION INTRAVENOUS; SUBCUTANEOUS at 04:12

## 2024-12-03 RX ADMIN — LEVOTHYROXINE SODIUM 150 MCG: 150 TABLET ORAL at 05:12

## 2024-12-03 RX ADMIN — HYDROCODONE BITARTRATE AND ACETAMINOPHEN 1 TABLET: 5; 325 TABLET ORAL at 11:12

## 2024-12-03 RX ADMIN — PREGABALIN 50 MG: 50 CAPSULE ORAL at 10:12

## 2024-12-03 RX ADMIN — INSULIN ASPART 10 UNITS: 100 INJECTION, SOLUTION INTRAVENOUS; SUBCUTANEOUS at 04:12

## 2024-12-03 RX ADMIN — PREGABALIN 50 MG: 50 CAPSULE ORAL at 09:12

## 2024-12-03 NOTE — ED PROVIDER NOTES
Encounter Date: 12/2/2024    SCRIBE #1 NOTE: I, Myles Page, am scribing for, and in the presence of,  Johnnie Gunn MD. I have scribed the following portions of the note - Other sections scribed: HPI,ROS,PE.       History     Chief Complaint   Patient presents with    Hematuria     Pt arrives via [facility] coming from Nursing home , pt noticed hematuria in heath cath earlier today, pt was recently admitted here for hematuria, pt recently restarted on Eliquis. Gross hematuria noted in heath bag on arrival.      75 y/o male with PMHx of BPH, CHF, DM, hard of hearing, HTN, PAD, and thyroid disease presents to ED c/o hematuria onset today. Pt reports noticing blood in his heath bag earlier today. He states he was recently started back on his eliquis. He denies any pain or lightheadedness.     The history is provided by the patient and medical records. No  was used.     Review of patient's allergies indicates:   Allergen Reactions    Tobramycin Shortness Of Breath     Past Medical History:   Diagnosis Date    Anticoagulant long-term use     BPH (benign prostatic hyperplasia)     CHF (congestive heart failure)     Dependent for wheelchair mobility     Diabetes mellitus     Hard of hearing     High cholesterol     Hypertension     PAD (peripheral artery disease)     Renal disorder     Due to administration of iv Tobramycin. pt went into renal failure.    Requires continuous at home supplemental oxygen     due to CHF. Pt does not have COPD.    Thyroid disease     Wound of right foot      Past Surgical History:   Procedure Laterality Date    AMPUTATION OF RIGHT MIDDLE TOE       APPENDECTOMY      APPLICATION, GRAFT Right 1/13/2023    Procedure: APPLICATION, GRAFT Right Theraskin Graft  [facility];  Surgeon: Landon Armas Jr., DPM;  Location: [facility];  Service: Podiatry;  Laterality: Right;    BELLOW THE KNEE AMPUTATION Left     BLADDER FULGURATION N/A 11/23/2024    Procedure: FULGURATION,  BLADDER;  Surgeon: Pietro Chavis MD;  Location: [facility] OR;  Service: Urology;  Laterality: N/A;    CATARACT EXTRACTION Bilateral     DEBRIDEMENT OF FOOT Right 1/13/2023    Procedure: DEBRIDEMENT, FOOT Right;  Surgeon: Landon Armas Jr., DPM;  Location: [facility] OR;  Service: Podiatry;  Laterality: Right;    EVACUATION OF HEMATOMA N/A 11/23/2024    Procedure: EVACUATION, HEMATOMA;  Surgeon: Pietro Chavis MD;  Location: [facility] OR;  Service: Urology;  Laterality: N/A;    LEFT FOOT SURGERY       MULTIPLE TIMES PRIOR TO AMPUTATION     Family History   Problem Relation Name Age of Onset    Lung cancer Mother      Diabetes Mother      Heart attack Father       Social History     Tobacco Use    Smoking status: Never    Smokeless tobacco: Current     Types: Chew   Substance Use Topics    Alcohol use: Not Currently    Drug use: Not Currently     Review of Systems   Constitutional:  Negative for chills and fever.   HENT:  Negative for drooling and sore throat.    Eyes:  Negative for pain and redness.   Respiratory:  Negative for shortness of breath, wheezing and stridor.    Cardiovascular:  Negative for chest pain, palpitations and leg swelling.   Gastrointestinal:  Negative for abdominal pain, nausea and vomiting.   Genitourinary:  Positive for hematuria. Negative for dysuria.   Musculoskeletal:  Negative for back pain, neck pain and neck stiffness.   Skin:  Negative for rash and wound.   Neurological:  Negative for weakness, light-headedness and numbness.   Hematological:  Does not bruise/bleed easily.       Physical Exam     Initial Vitals [12/02/24 2041]   BP Pulse Resp Temp SpO2   (!) 160/52 66 16 99.5 °F (37.5 °C) 97 %      MAP       --         Physical Exam    Nursing note and vitals reviewed.  Constitutional: He appears well-developed. Nasal cannula in place.   Hard of hearing.    Eyes: EOM are normal. Pupils are equal, round, and reactive to light.   Cardiovascular:  Normal rate and regular rhythm.           No  murmur heard.  Pulmonary/Chest: Breath sounds normal. No respiratory distress. He has no wheezes. He has no rales.   Abdominal: Abdomen is soft. He exhibits no distension. There is abdominal tenderness in the suprapubic area.   Some hyperkeratosis of skin.  There is no rebound and no guarding.   Genitourinary:    Genitourinary Comments: Gross blood in heath bag.        Skin: Skin is warm. Capillary refill takes less than 2 seconds. No rash noted.         ED Course   Procedures  Labs Reviewed   COMPREHENSIVE METABOLIC PANEL - Abnormal       Result Value    Sodium 133 (*)     Potassium 4.1      Chloride 95 (*)     CO2 29      Glucose 384 (*)     Blood Urea Nitrogen 32.6 (*)     Creatinine 1.43 (*)     Calcium 8.6 (*)     Protein Total 7.2      Albumin 2.1 (*)     Globulin 5.1 (*)     Albumin/Globulin Ratio 0.4 (*)     Bilirubin Total 0.3            ALT 26      AST 24      eGFR 52      Anion Gap 9.0      BUN/Creatinine Ratio 23     PROTIME-INR - Abnormal    PT 16.8 (*)     INR 1.4 (*)    URINALYSIS, REFLEX TO URINE CULTURE - Abnormal    Color, UA Dark-Red (*)     Appearance, UA Bloody (*)     Specific Gravity, UA 1.019      pH, UA 7.5      Protein, UA Color may interfere with results      Glucose, UA Color may interfere with results      Ketones, UA Color may interfere with results      Blood, UA 4+ (*)     Bilirubin, UA Color may interfere with results      Urobilinogen, UA Color may interfere with results      Nitrites, UA Color may interfere with results      Leukocyte Esterase, UA Color may interfere with results      RBC, UA >=100 (*)     WBC, UA None Seen      Bacteria, UA None Seen      Squamous Epithelial Cells, UA None Seen      Narrative:     Grossly bloody urine, color interferes with results.   CBC WITH DIFFERENTIAL - Abnormal    WBC 10.75      RBC 3.06 (*)     Hgb 8.2 (*)     Hct 25.4 (*)     MCV 83.0      MCH 26.8 (*)     MCHC 32.3 (*)     RDW 15.9      Platelet 300      MPV 9.7      Neut % 63.0       Lymph % 21.9      Mono % 9.0      Eos % 4.2      Basophil % 0.5      Lymph # 2.35      Neut # 6.78      Mono # 0.97      Eos # 0.45      Baso # 0.05      IG# 0.15 (*)     IG% 1.4      NRBC% 0.0     APTT - Normal    PTT 28.5     CBC W/ AUTO DIFFERENTIAL    Narrative:     The following orders were created for panel order CBC auto differential.  Procedure                               Abnormality         Status                     ---------                               -----------         ------                     CBC with Differential[[id number]]       Abnormal            Final result                 Please view results for these tests on the individual orders.          Imaging Results    None          Medications - No data to display  Medical Decision Making  Problems Addressed:  Gross hematuria: acute illness or injury    Amount and/or Complexity of Data Reviewed  Labs: ordered. Decision-making details documented in ED Course.    Risk  Decision regarding hospitalization.    Differential diagnosis (includes but is not limited to):   Gross hematuria, symptomatic anemia, urinary infection, kidney injury    MDM Narrative  74-year-old male presents for gross hematuria in his Sharp catheter that started earlier today.  Patient recently admitted here for hematuria requiring cystoscopy and urologic intervention.  Patient is anticoagulated on Eliquis.  Bladder irrigation initiated.  Labs reviewed, hemoglobin mildly down to 8.2 from previous of 8.4.  Creatinine mildly increased at 1.4 from 1.1-1.2 earlier.  Urology consulted, appreciate recommendations.  Discussed with the hospitalist, will admit.    Dispo: Admit    My independent radiology interpretation: as above  Point of care US (independently performed and interpreted):   Decision rules/clinical scoring:     Sepsis Perfusion Assessment:     Amount and/or Complexity of Data Reviewed  Independent historian: none   Summary of history:   External data reviewed: notes  from previous ED visits and notes from clinic visits  Summary of data reviewed: Prior records reviewed  Risk and benefits of testing: discussed   Labs: ordered and reviewed  Radiology: ordered and independent interpretation performed (see above or ED course)  ECG/medicine tests: ordered and independent interpretation performed (see above or ED course)  Discussion of management or test interpretation with external provider(s): discussed with hospitalist physician and discussed with urology consultant   Summary of discussion: as above    Risk  Parenteral controlled substances   Drug therapy requiring intense monitoring for toxicity   Decision regarding hospitalization  Shared decision making     Critical Care  none    Data Reviewed/Counseling: I have personally reviewed the patient's vital signs, nursing notes, and other relevant tests, information, and imaging. I had a detailed discussion regarding the historical points, exam findings, and any diagnostic results supporting the discharge diagnosis. I personally performed the history, PE, MDM and procedures as documented above and agree with the scribe's documentation.    Portions of this note were dictated using voice recognition software. Although it was reviewed for accuracy, some inherent voice recognition errors may have occurred and may be present in this document.          Scribe Attestation:   Scribe #1: I performed the above scribed service and the documentation accurately describes the services I performed. I attest to the accuracy of the note.    Attending Attestation:           Physician Attestation for Scribe:  Physician Attestation Statement for Scribe #1: I, Johnnie Gunn MD, reviewed documentation, as scribed by Myles Page in my presence, and it is both accurate and complete.                                    Clinical Impression:  Final diagnoses:  [R31.0] Gross hematuria (Primary)          ED Disposition Condition    Admit Stable                 Johnnie Gunn MD  12/03/24 0201

## 2024-12-03 NOTE — PLAN OF CARE
12/03/24 1057   Discharge Assessment   Assessment Type Discharge Planning Assessment   Confirmed/corrected address, phone number and insurance Yes   Confirmed Demographics Correct on Facesheet   Source of Information patient   Communicated ISABEL with patient/caregiver Date not available/Unable to determine   Reason For Admission gross hematuria   People in Home facility resident   Facility Arrived From: [facility]   Do you expect to return to your current living situation? Yes   Prior to hospitilization cognitive status: Alert/Oriented   Current cognitive status: Alert/Oriented   Walking or Climbing Stairs Difficulty yes   Walking or Climbing Stairs ambulation difficulty, requires equipment;stair climbing difficulty, requires equipment;transferring difficulty, requires equipment   Mobility Management wheelchair   Dressing/Bathing Difficulty yes   Dressing/Bathing bathing difficulty, requires equipment;dressing difficulty, requires equipment   Dressing/Bathing Management Assistance with ADLS   Home Accessibility wheelchair accessible   Equipment Currently Used at Home none   Readmission within 30 days? No   Patient currently being followed by outpatient case management? No   Do you currently have service(s) that help you manage your care at home? Yes   Do you take prescription medications? Yes   Do you have prescription coverage? Yes   Coverage Medicare   Do you have any problems affording any of your prescribed medications? No   Is the patient taking medications as prescribed? yes   Who is going to help you get home at discharge? NH staff   How do you get to doctors appointments? other (see comments)  ([facility] while in SNF)   Are you on dialysis? No   Do you take coumadin? No   Discharge Plan A Skilled Nursing Facility  (resume [facility])   Discharge Plan B Return to Nursing Home   DME Needed Upon Discharge  none   Discharge Plan discussed with: Patient   Transition of Care Barriers None

## 2024-12-03 NOTE — PLAN OF CARE
Patient at [facility] for [facility] states that he has been there about 6 weeks with breaks in the stay plans to discharge back there

## 2024-12-03 NOTE — CONSULTS
Landon Aragon 1950  [id number]  12/3/2024    CONSULTING PHYSICIAN: Main Moreno    Reason for consult: Gross hematuria    HPI: Mr. 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  Patient is s/p cystoscopy with clot evacuation of about 500cc, bladder/urethral fulguration with Dr. Farrell on 10/8/2024 and again on 11/23 with Dr. Chavis. Hematuria resolved and he was discharged on 11/29, his Eliquis was resumed and he returned.        Past Medical History:   Diagnosis Date    Anticoagulant long-term use     BPH (benign prostatic hyperplasia)     CHF (congestive heart failure)     Dependent for wheelchair mobility     Diabetes mellitus     Hard of hearing     High cholesterol     Hypertension     PAD (peripheral artery disease)     Renal disorder     Due to administration of iv Tobramycin. pt went into renal failure.    Requires continuous at home supplemental oxygen     due to CHF. Pt does not have COPD.    Thyroid disease     Wound of right foot      Past Surgical History:   Procedure Laterality Date    AMPUTATION OF RIGHT MIDDLE TOE       APPENDECTOMY      APPLICATION, GRAFT Right 1/13/2023    Procedure: APPLICATION, GRAFT Right Theraskin Graft  [facility];  Surgeon: Landon Armas Jr., DPM;  Location: [facility];  Service: Podiatry;  Laterality: Right;    BELLOW THE KNEE AMPUTATION Left     BLADDER FULGURATION N/A 11/23/2024    Procedure: FULGURATION, BLADDER;  Surgeon: Pietro Chavis MD;  Location: [facility] OR;  Service: Urology;  Laterality: N/A;    CATARACT EXTRACTION Bilateral     DEBRIDEMENT OF FOOT Right 1/13/2023    Procedure: DEBRIDEMENT, FOOT Right;  Surgeon: Landon Armas Jr., DPM;  Location: [facility] OR;  Service: Podiatry;  Laterality: Right;    EVACUATION OF HEMATOMA N/A 11/23/2024    Procedure: EVACUATION, HEMATOMA;  Surgeon: Pietro Chavis MD;  Location: [facility] OR;   Service: Urology;  Laterality: N/A;    LEFT FOOT SURGERY       MULTIPLE TIMES PRIOR TO AMPUTATION     Family History   Problem Relation Name Age of Onset    Lung cancer Mother      Diabetes Mother      Heart attack Father       Social History     Tobacco Use    Smoking status: Never    Smokeless tobacco: Current     Types: Chew   Substance Use Topics    Alcohol use: Not Currently    Drug use: Not Currently     Current Facility-Administered Medications   Medication Dose Route Frequency Provider Last Rate Last Admin    acetaminophen tablet 650 mg  650 mg Oral Q6H PRN Greer Haas, AGACNP-BC        aluminum-magnesium hydroxide-simethicone 200-200-20 mg/5 mL suspension 30 mL  30 mL Oral QID PRN Greer Haas, AGACNP-BC        amiodarone tablet 100 mg  100 mg Oral Daily Tiffanie Quach MD        dextrose 10% bolus 125 mL 125 mL  12.5 g Intravenous PRN Greer Haas, AGACNP-BC        dextrose 10% bolus 250 mL 250 mL  25 g Intravenous PRN Greer Haas, AGACNP-BC        famotidine tablet 20 mg  20 mg Oral Daily Tiffanie Quach MD        glucagon (human recombinant) injection 1 mg  1 mg Intramuscular PRN Greer Haas, AGACNP-BC        glucose chewable tablet 16 g  16 g Oral PRN Greer Haas, AGACNP-BC        glucose chewable tablet 24 g  24 g Oral PRN Greer Haas, AGACNP-BC        insulin aspart U-100 injection 0-10 Units  0-10 Units Subcutaneous QID (AC + HS) PRN Greer Haas, AGACNP-BC   5 Units at 12/03/24 0414    levothyroxine tablet 150 mcg  150 mcg Oral Before breakfast Tiffanie Quach MD   150 mcg at 12/03/24 0550    melatonin tablet 6 mg  6 mg Oral Nightly PRN Greer Haas AGACNP-BC        metoprolol succinate (TOPROL-XL) 24 hr tablet 25 mg  25 mg Oral Daily Tiffanie Quach MD        midazolam (PF) (VERSED) 1 mg/mL injection 2 mg  2 mg Intravenous Once PRN Luis Miguel Mercedes MD        naloxone 0.4 mg/mL injection 0.02 mg  0.02 mg Intravenous PRN Greer Haas AGACNP-BC         ondansetron injection 4 mg  4 mg Intravenous Q4H PRN Greer Haas AGACNP-BC        polyethylene glycol packet 17 g  17 g Oral BID PRN Greer Haas AGACNP-BC        pregabalin capsule 50 mg  50 mg Oral BID Tiffanie Quach MD        prochlorperazine injection Soln 5 mg  5 mg Intravenous Q6H PRN Greer Haas AGACNP-BC        simethicone chewable tablet 80 mg  1 tablet Oral QID PRN Greer Haas AGACNP-BC        tamsulosin 24 hr capsule 0.4 mg  0.4 mg Oral Daily Tiffanie Quach MD         Review of patient's allergies indicates:   Allergen Reactions    Tobramycin Shortness Of Breath     ROS: obtained from chart, patient not very conversive and there is no family at the bedside    PHYSICAL EXAM:  Vitals:    12/03/24 0258 12/03/24 0300 12/03/24 0530 12/03/24 0725   BP: 100/60  (!) 158/71 104/60   Pulse: 63  61 66   Resp: 18  18 19   Temp: 98.2 °F (36.8 °C)  97.4 °F (36.3 °C) 97.9 °F (36.6 °C)   TempSrc:   Oral Oral   SpO2: (!) 93%  97% 99%   Weight:  113.4 kg (250 lb)     Height:  5' (1.524 m)         Intake/Output Summary (Last 24 hours) at 12/3/2024 0916  Last data filed at 12/3/2024 0634  Gross per 24 hour   Intake --   Output -800 ml   Net 800 ml       GEN: WN/WD NAD  HEENT: NCAT, PERRLA, EOMI, OP clear, nares patent  CV: RRR  RESP: Even and unlabored  ABD: soft, NTND  : cardenas with very light pink tinged urine with minimal CBI  EXT: no C/C/E  NEURO: no focal deficits, MAEW, awake and alert      LABS:  Recent Results (from the past 24 hours)   Comprehensive metabolic panel    Collection Time: 12/02/24  9:27 PM   Result Value Ref Range    Sodium 133 (L) 136 - 145 mmol/L    Potassium 4.1 3.5 - 5.1 mmol/L    Chloride 95 (L) 98 - 107 mmol/L    CO2 29 23 - 31 mmol/L    Glucose 384 (H) 82 - 115 mg/dL    Blood Urea Nitrogen 32.6 (H) 8.4 - 25.7 mg/dL    Creatinine 1.43 (H) 0.72 - 1.25 mg/dL    Calcium 8.6 (L) 8.8 - 10.0 mg/dL    Protein Total 7.2 5.8 - 7.6 gm/dL    Albumin 2.1 (L) 3.4 - 4.8 g/dL     Globulin 5.1 (H) 2.4 - 3.5 gm/dL    Albumin/Globulin Ratio 0.4 (L) 1.1 - 2.0 ratio    Bilirubin Total 0.3 <=1.5 mg/dL     40 - 150 unit/L    ALT 26 0 - 55 unit/L    AST 24 5 - 34 unit/L    eGFR 52 mL/min/1.73/m2    Anion Gap 9.0 mEq/L    BUN/Creatinine Ratio 23    Protime-INR    Collection Time: 12/02/24  9:27 PM   Result Value Ref Range    PT 16.8 (H) 12.5 - 14.5 seconds    INR 1.4 (H) <=1.3   APTT    Collection Time: 12/02/24  9:27 PM   Result Value Ref Range    PTT 28.5 23.2 - 33.7 seconds   Urinalysis, Reflex to Urine Culture    Collection Time: 12/02/24  9:27 PM    Specimen: Urine   Result Value Ref Range    Color, UA Dark-Red (A) Yellow, Light-Yellow, Colorless, Straw, Dark-Yellow    Appearance, UA Bloody (A) Clear    Specific Gravity, UA 1.019 1.005 - 1.030    pH, UA 7.5 5.0 - 8.5    Protein, UA Color may interfere with results Negative    Glucose, UA Color may interfere with results Negative, Normal    Ketones, UA Color may interfere with results Negative    Blood, UA 4+ (A) Negative    Bilirubin, UA Color may interfere with results Negative    Urobilinogen, UA Color may interfere with results 0.2, 1.0, Normal    Nitrites, UA Color may interfere with results Negative    Leukocyte Esterase, UA Color may interfere with results Negative    RBC, UA >=100 (A) None Seen, 0-2, 3-5, 0-5 /HPF    WBC, UA None Seen None Seen, 0-2, 3-5, 0-5 /HPF    Bacteria, UA None Seen None Seen, Trace /HPF    Squamous Epithelial Cells, UA None Seen None Seen, Trace, Rare /HPF   CBC with Differential    Collection Time: 12/02/24  9:27 PM   Result Value Ref Range    WBC 10.75 4.50 - 11.50 x10(3)/mcL    RBC 3.06 (L) 4.70 - 6.10 x10(6)/mcL    Hgb 8.2 (L) 14.0 - 18.0 g/dL    Hct 25.4 (L) 42.0 - 52.0 %    MCV 83.0 80.0 - 94.0 fL    MCH 26.8 (L) 27.0 - 31.0 pg    MCHC 32.3 (L) 33.0 - 36.0 g/dL    RDW 15.9 11.5 - 17.0 %    Platelet 300 130 - 400 x10(3)/mcL    MPV 9.7 7.4 - 10.4 fL    Neut % 63.0 %    Lymph % 21.9 %    Mono % 9.0 %     Eos % 4.2 %    Basophil % 0.5 %    Lymph # 2.35 0.6 - 4.6 x10(3)/mcL    Neut # 6.78 2.1 - 9.2 x10(3)/mcL    Mono # 0.97 0.1 - 1.3 x10(3)/mcL    Eos # 0.45 0 - 0.9 x10(3)/mcL    Baso # 0.05 <=0.2 x10(3)/mcL    IG# 0.15 (H) 0 - 0.04 x10(3)/mcL    IG% 1.4 %    NRBC% 0.0 %   POCT glucose    Collection Time: 12/03/24  2:31 AM   Result Value Ref Range    POCT Glucose 384 (H) 70 - 110 mg/dL   Comprehensive Metabolic Panel (CMP)    Collection Time: 12/03/24  4:43 AM   Result Value Ref Range    Sodium 136 136 - 145 mmol/L    Potassium 3.9 3.5 - 5.1 mmol/L    Chloride 96 (L) 98 - 107 mmol/L    CO2 29 23 - 31 mmol/L    Glucose 376 (H) 82 - 115 mg/dL    Blood Urea Nitrogen 31.3 (H) 8.4 - 25.7 mg/dL    Creatinine 1.31 (H) 0.72 - 1.25 mg/dL    Calcium 8.1 (L) 8.8 - 10.0 mg/dL    Protein Total 6.6 5.8 - 7.6 gm/dL    Albumin 2.3 (L) 3.4 - 4.8 g/dL    Globulin 4.3 (H) 2.4 - 3.5 gm/dL    Albumin/Globulin Ratio 0.5 (L) 1.1 - 2.0 ratio    Bilirubin Total 0.3 <=1.5 mg/dL     40 - 150 unit/L    ALT 23 0 - 55 unit/L    AST 18 5 - 34 unit/L    eGFR 58 mL/min/1.73/m2    Anion Gap 11.0 mEq/L    BUN/Creatinine Ratio 24    Magnesium    Collection Time: 12/03/24  4:43 AM   Result Value Ref Range    Magnesium Level 1.80 1.60 - 2.60 mg/dL   Phosphorus    Collection Time: 12/03/24  4:43 AM   Result Value Ref Range    Phosphorus Level 3.4 2.3 - 4.7 mg/dL   CBC with Differential    Collection Time: 12/03/24  4:43 AM   Result Value Ref Range    WBC 10.16 4.50 - 11.50 x10(3)/mcL    RBC 3.32 (L) 4.70 - 6.10 x10(6)/mcL    Hgb 8.7 (L) 14.0 - 18.0 g/dL    Hct 28.1 (L) 42.0 - 52.0 %    MCV 84.6 80.0 - 94.0 fL    MCH 26.2 (L) 27.0 - 31.0 pg    MCHC 31.0 (L) 33.0 - 36.0 g/dL    RDW 15.9 11.5 - 17.0 %    Platelet 341 130 - 400 x10(3)/mcL    MPV 9.9 7.4 - 10.4 fL    Neut % 61.4 %    Lymph % 21.6 %    Mono % 10.2 %    Eos % 4.5 %    Basophil % 0.6 %    Lymph # 2.19 0.6 - 4.6 x10(3)/mcL    Neut # 6.24 2.1 - 9.2 x10(3)/mcL    Mono # 1.04 0.1 - 1.3  x10(3)/mcL    Eos # 0.46 0 - 0.9 x10(3)/mcL    Baso # 0.06 <=0.2 x10(3)/mcL    IG# 0.17 (H) 0 - 0.04 x10(3)/mcL    IG% 1.7 %    NRBC% 0.0 %         IMAGING:  None      ASSESSMENT:  73 yo male admitted with recurrent hematuria. S/p cysto with clot evacuation and fulguration 10/8 and 11/23. Eliquis was restarted a few days ago and hematuria recurred. He was started on CBI nad his urine is clearing easily.       PLAN:  Continue holding Eliquis. Continue to titrate CBI. No indication for acute surgical intervention.     CHRIS Ravi

## 2024-12-03 NOTE — PLAN OF CARE
Problem: Infection  Goal: Absence of Infection Signs and Symptoms  Outcome: Progressing     Problem: Adult Inpatient Plan of Care  Goal: Plan of Care Review  Outcome: Progressing  Goal: Patient-Specific Goal (Individualized)  Outcome: Progressing

## 2024-12-03 NOTE — H&P
[facility] Hospital Medicine History & Physical Examination       Patient Name: Landon Aragon  MRN: [id number]  Patient Class: IP- Inpatient   Admission Date: 12/2/2024   Admitting Physician: HM Service   Length of Stay: 0  Attending Physician: JACKI HOLDER MD  Primary Care Provider: Mat Taylor FNP  Face-to-Face encounter date: 12/03/2024  Code Status: Full code.  History source: Medical records, patient.     Documented PMH:  Systemic HTN, dyslipidemia, CHF, T2 DM, PAD, hypothyroidism.    Reason for this time presentation on 12/03/2024  to ED:  Gross hematuria.    Screening for Social Drivers for health:  Patient screened for food insecurity, housing instability, transportation needs, utility difficulties, and interpersonal safety (select all that apply as identified as concern)  []Housing or Food  []Transportation Needs  []Utility Difficulties  []Interpersonal safety  [x]None      Patient information was obtained from patient, patient's family, past medical records and ER records.  ED records were reviewed in detail and documented below    HISTORY OF PRESENT ILLNESS:     Pertinent info includes:  74 y.o. male .  Marginal historian.  Patient noticed gross hematuria in his Sharp catheter today, he recently had similar hospitalization for gross hematuria and underwent cystoscopy by Urology, it was thought hematuria was secondary trauma from Sharp exchange.  His Eliquis had later been resumed.  Denies any other complaints.  ROS is otherwise limited because of being minute history and a prefers to keep sleeping rather than participating in conversation    In the ED his BP was somewhat soft . Notable workup included Hb 8.2 .  The patient was admitted for further management.    PAST MEDICAL HISTORY:     Past Medical History:   Diagnosis Date    Anticoagulant long-term use     BPH (benign prostatic hyperplasia)     CHF (congestive heart failure)     Dependent for wheelchair mobility      Diabetes mellitus     Hard of hearing     High cholesterol     Hypertension     PAD (peripheral artery disease)     Renal disorder     Due to administration of iv Tobramycin. pt went into renal failure.    Requires continuous at home supplemental oxygen     due to CHF. Pt does not have COPD.    Thyroid disease     Wound of right foot        PAST SURGICAL HISTORY:     Past Surgical History:   Procedure Laterality Date    AMPUTATION OF RIGHT MIDDLE TOE       APPENDECTOMY      APPLICATION, GRAFT Right 1/13/2023    Procedure: APPLICATION, GRAFT Right Theraskin Graft  Bennett Jean -Rep;  Surgeon: Landon Armas Jr., DPM;  Location: [facility] OR;  Service: Podiatry;  Laterality: Right;    BELLOW THE KNEE AMPUTATION Left     BLADDER FULGURATION N/A 11/23/2024    Procedure: FULGURATION, BLADDER;  Surgeon: Pietro Chavis MD;  Location: [facility] OR;  Service: Urology;  Laterality: N/A;    CATARACT EXTRACTION Bilateral     DEBRIDEMENT OF FOOT Right 1/13/2023    Procedure: DEBRIDEMENT, FOOT Right;  Surgeon: Landon Armas Jr., DPM;  Location: [facility] OR;  Service: Podiatry;  Laterality: Right;    EVACUATION OF HEMATOMA N/A 11/23/2024    Procedure: EVACUATION, HEMATOMA;  Surgeon: Pietro Chavis MD;  Location: [facility] OR;  Service: Urology;  Laterality: N/A;    LEFT FOOT SURGERY       MULTIPLE TIMES PRIOR TO AMPUTATION       ALLERGIES:   Tobramycin    FAMILY HISTORY:   Reviewed and negative    SOCIAL HISTORY:     Social History     Tobacco Use    Smoking status: Never    Smokeless tobacco: Current     Types: Chew   Substance Use Topics    Alcohol use: Not Currently        HOME MEDICATIONS:     Prior to Admission medications    Medication Sig Start Date End Date Taking? Authorizing Provider   aluminum & magnesium hydroxide-simethicone (MYLANTA MAX STRENGTH) 400-400-40 mg/5 mL suspension Take 30 mLs by mouth every 4 (four) hours as needed for Indigestion.   Yes Provider, Historical   amiodarone (PACERONE) 100 MG Tab Take 100 mg by  mouth once daily.   Yes Provider, Historical   amLODIPine (NORVASC) 5 MG tablet Take 5 mg by mouth once daily.   Yes Provider, Historical   apixaban (ELIQUIS) 5 mg Tab Take 5 mg by mouth 2 (two) times daily.   Yes Provider, Historical   aspirin 81 MG Chew Take 81 mg by mouth once daily.   Yes Provider, Historical   docusate sodium (COLACE) 100 MG capsule Take 100 mg by mouth 2 (two) times daily.   Yes Provider, Historical   famotidine (PEPCID) 20 MG tablet Take 20 mg by mouth once daily.   Yes Provider, Historical   ferrous gluconate (FERGON) 324 MG tablet Take 324 mg by mouth daily with breakfast.   Yes Provider, Historical   fluticasone propionate (FLONASE) 50 mcg/actuation nasal spray 2 sprays by Each Nostril route once daily.   Yes Provider, Historical   furosemide (LASIX) 80 MG tablet Take 80 mg by mouth 2 (two) times daily.   Yes Provider, Historical   HYDROcodone-acetaminophen (NORCO) 5-325 mg per tablet Take 1 tablet by mouth every 6 (six) hours as needed for Pain. 1/13/23  Yes Landon Armas Jr., DPBRIEN   hyoscyamine 0.125 mg Subl Place 1 tablet (0.125 mg total) under the tongue every 6 (six) hours as needed (bladder spasms). 11/29/24  Yes Oskar Carrillo MD   insulin glargine U-100, Lantus, (LANTUS U-100 INSULIN) 100 unit/mL injection Inject 35 Units into the skin 2 (two) times a day. 11/29/24  Yes Oskar Carrillo MD   INSULIN REGULAR 100 unit/mL Inj injection Inject 0-10 Units into the skin 3 (three) times daily before meals.   Yes Provider, Historical   levothyroxine (SYNTHROID) 75 MCG tablet Take 150 mcg by mouth before breakfast.   Yes Provider, Historical   metoprolol succinate 25 mg CSpX Take 25 mg by mouth once daily.   Yes Provider, Historical   nitrofurantoin, macrocrystal-monohydrate, (MACROBID) 100 MG capsule Take 1 capsule (100 mg total) by mouth every 12 (twelve) hours. for 10 days 11/29/24 12/9/24 Yes Oskar Carrillo MD   potassium chloride (MICRO-K) 10 MEQ CpSR Take 20 mEq by  mouth once.   Yes Provider, Historical   pregabalin (LYRICA) 75 MG capsule Take 75 mg by mouth 3 (three) times daily.   Yes Provider, Historical   rosuvastatin (CRESTOR) 40 MG Tab Take 40 mg by mouth every evening.   Yes Provider, Historical   sacubitriL-valsartan (ENTRESTO) 24-26 mg per tablet Take 1 tablet by mouth 2 (two) times daily.   Yes Provider, Historical   senna-docusate 8.6-50 mg (SENNA WITH DOCUSATE SODIUM) 8.6-50 mg per tablet Take 1 tablet by mouth once daily.   Yes Provider, Historical   tamsulosin (FLOMAX) 0.4 mg Cap Take 0.4 mg by mouth once daily.   Yes Provider, Historical   furosemide (LASIX) 40 MG tablet Take 40 mg by mouth once daily.  12/3/24  Provider, Historical       REVIEW OF SYSTEMS:   Except as documented, all other systems reviewed and negative     PHYSICAL EXAM:     VITAL SIGNS: 24 HRS MIN & MAX LAST   Temp  Min: 98.2 °F (36.8 °C)  Max: 99.5 °F (37.5 °C) 98.2 °F (36.8 °C)   BP  Min: 100/60  Max: 163/61 100/60   Pulse  Min: 60  Max: 77  63   Resp  Min: 10  Max: 20 18   SpO2  Min: 93 %  Max: 100 % (!) 93 %     General appearance   Alert, cooperative, no distress.   Head   Normocephalic, atraumatic   Eyes   Conjunctivae/corneas clear. PERRL, EOM's intact.    Nose  Nares normal. No drainage.   Throat  Lips, mucosa, and tongue normal.   Neck  Supple, thyroid no obvious abnormality. No appreciable JVD   Back    No CVA tenderness   Lungs    Vesicular breathing, B/l symmetric air entry, no wheezing, no crackles.   Chest wall   No tenderness.   Heart   Regular rate and rhythm, S1, S2 normal, no murmur, rub or gallop.    Abdomen    Soft, non-tender. Bowel sounds normal. No palpable masses or organomegaly   Extremities  Extremities normal, well perfused. Muscle tone is normal and equal bilaterally. No peripheral edema.    Pulses    Skin  No rashes or lesions   Neurologic  Alert and oriented, No apparent FNDs.          LABS AND IMAGING:     Recent Labs   Lab 12/02/24 0404 12/02/24 2127  12/03/24  0443   WBC 10.70 10.75 10.16   RBC 3.13* 3.06* 3.32*   HGB 8.4* 8.2* 8.7*   HCT 26.9* 25.4* 28.1*   MCV 85.9 83.0 84.6   MCH 26.8* 26.8* 26.2*   MCHC 31.2* 32.3* 31.0*   RDW 15.9 15.9 15.9    300 341   MPV 10.1 9.7 9.9       Recent Labs   Lab 12/01/24  0545 12/02/24  0404 12/02/24  2127    138 133*   K 3.9 4.1 4.1   CL 98 97* 95*   CO2 30 31 29   BUN 24.5 28.7* 32.6*   CREATININE 1.07 1.15 1.43*   CALCIUM 8.3* 8.1* 8.6*   ALBUMIN 2.1* 2.1* 2.1*   ALKPHOS 122 126 147   ALT 16 24 26   AST 19 20 24   BILITOT 0.3 0.3 0.3       Microbiology Results (last 7 days)       ** No results found for the last 168 hours. **             X-Ray Chest PA Lateral With Lordotic Vie  Indication: Preoperative respiratory evaluation     Findings: No prior studies are available for comparison. Heart size is  normal and lungs are clear bilaterally. Pulmonary vasculature is  normal.     IMPRESSION: No evidence of acute disease.        Electronically Signed By: Heavenly RAYA, Boone INMAN  Date/Time Signed: 08/13/2018 16:44      ASSESSMENT & PLAN:     Gross hematuria in the setting of being on Eliquis  [Recent hospitalization with similar presentation]  On presentation: Hb 8.2.   Started on CBI  Urology consultation requested from ER.  H&H monitoring. Transfusion threshold Hb < 7.     Chronic conditions    Systemic HTN- holding amlodipine.  Dyslipidemia- holding statin.  CHF - holding Lasix, Entresto.  PAD- holding aspirin.  AFIB- continue amiodarone.  Chronic anticoagulation- holding Eliquis    T2DM  IP diabetic regimen: SSI  OP regimen:  Lantus 35 units bid, SS regular insulin.    G4 CKD:  Baseline Cr ~ 1.1-1.6.    Hypothyroidism- continue levothyroxine.  Neuropathy- continue Lyrica.  BPH- continue Flomax.  GERD- continue Pepcid.    Miscellaneous:   * Labs, imaging as well as medications reviewed.   * Nutrition: NPO pending Urology eval.  * Thromboprophylaxis: Consider therapies, holding Eliquis, encourage ambulation as/  when tolerated.   * Stress ulcer prophylaxis: Not indicated.  * PTA Med list reviewed [when available], all or part of it may still be pending verification/ reconciliation.   *MDM complexity:  [x] High  * Envision effective transition to outpatient care.  ________________________________  INPATIENT LIST OF MEDICATIONS     Scheduled Meds:   LIDOcaine (PF) 10 mg/ml (1%)  1 mL Intradermal Once    ondansetron  4 mg Oral Once     Continuous Infusions:   electrolyte-A   Intravenous Continuous         PRN Meds:.  Current Facility-Administered Medications:     acetaminophen, 650 mg, Oral, Q6H PRN    aluminum-magnesium hydroxide-simethicone, 30 mL, Oral, QID PRN    dextrose 10%, 12.5 g, Intravenous, PRN    dextrose 10%, 25 g, Intravenous, PRN    glucagon (human recombinant), 1 mg, Intramuscular, PRN    glucose, 16 g, Oral, PRN    glucose, 24 g, Oral, PRN    insulin aspart U-100, 0-10 Units, Subcutaneous, QID (AC + HS) PRN    melatonin, 6 mg, Oral, Nightly PRN    midazolam, 2 mg, Intravenous, Once PRN    naloxone, 0.02 mg, Intravenous, PRN    ondansetron, 4 mg, Intravenous, Q4H PRN    polyethylene glycol, 17 g, Oral, BID PRN    prochlorperazine, 5 mg, Intravenous, Q6H PRN    simethicone, 1 tablet, Oral, QID PRN

## 2024-12-04 LAB
HCT VFR BLD AUTO: 25.2 % (ref 42–52)
HCT VFR BLD AUTO: 25.2 % (ref 42–52)
HGB BLD-MCNC: 7.8 G/DL (ref 14–18)
HGB BLD-MCNC: 7.8 G/DL (ref 14–18)
POCT GLUCOSE: 278 MG/DL (ref 70–110)
POCT GLUCOSE: 298 MG/DL (ref 70–110)
POCT GLUCOSE: 338 MG/DL (ref 70–110)
POCT GLUCOSE: 354 MG/DL (ref 70–110)

## 2024-12-04 PROCEDURE — 27000221 HC OXYGEN, UP TO 24 HOURS

## 2024-12-04 PROCEDURE — 85014 HEMATOCRIT: CPT | Performed by: HOSPITALIST

## 2024-12-04 PROCEDURE — 85018 HEMOGLOBIN: CPT | Performed by: HOSPITALIST

## 2024-12-04 PROCEDURE — 25000003 PHARM REV CODE 250: Performed by: HOSPITALIST

## 2024-12-04 PROCEDURE — 25000003 PHARM REV CODE 250: Performed by: INTERNAL MEDICINE

## 2024-12-04 PROCEDURE — 36415 COLL VENOUS BLD VENIPUNCTURE: CPT | Performed by: HOSPITALIST

## 2024-12-04 PROCEDURE — 63600175 PHARM REV CODE 636 W HCPCS: Performed by: NURSE PRACTITIONER

## 2024-12-04 PROCEDURE — 11000001 HC ACUTE MED/SURG PRIVATE ROOM

## 2024-12-04 RX ORDER — AMMONIUM LACTATE 12 G/100G
LOTION TOPICAL 2 TIMES DAILY PRN
Status: DISCONTINUED | OUTPATIENT
Start: 2024-12-04 | End: 2024-12-05 | Stop reason: HOSPADM

## 2024-12-04 RX ADMIN — INSULIN ASPART 8 UNITS: 100 INJECTION, SOLUTION INTRAVENOUS; SUBCUTANEOUS at 05:12

## 2024-12-04 RX ADMIN — INSULIN ASPART 6 UNITS: 100 INJECTION, SOLUTION INTRAVENOUS; SUBCUTANEOUS at 04:12

## 2024-12-04 RX ADMIN — AMIODARONE HYDROCHLORIDE 100 MG: 100 TABLET ORAL at 08:12

## 2024-12-04 RX ADMIN — PREGABALIN 50 MG: 50 CAPSULE ORAL at 08:12

## 2024-12-04 RX ADMIN — HYDROCODONE BITARTRATE AND ACETAMINOPHEN 1 TABLET: 5; 325 TABLET ORAL at 04:12

## 2024-12-04 RX ADMIN — TAMSULOSIN HYDROCHLORIDE 0.4 MG: 0.4 CAPSULE ORAL at 08:12

## 2024-12-04 RX ADMIN — INSULIN ASPART 5 UNITS: 100 INJECTION, SOLUTION INTRAVENOUS; SUBCUTANEOUS at 08:12

## 2024-12-04 RX ADMIN — FAMOTIDINE 20 MG: 20 TABLET, FILM COATED ORAL at 08:12

## 2024-12-04 RX ADMIN — METOPROLOL SUCCINATE 25 MG: 25 TABLET, EXTENDED RELEASE ORAL at 08:12

## 2024-12-04 RX ADMIN — INSULIN ASPART 6 UNITS: 100 INJECTION, SOLUTION INTRAVENOUS; SUBCUTANEOUS at 11:12

## 2024-12-04 RX ADMIN — LEVOTHYROXINE SODIUM 150 MCG: 150 TABLET ORAL at 04:12

## 2024-12-04 NOTE — PLAN OF CARE
Problem: Infection  Goal: Absence of Infection Signs and Symptoms  Outcome: Progressing     Problem: Adult Inpatient Plan of Care  Goal: Plan of Care Review  Outcome: Progressing  Goal: Patient-Specific Goal (Individualized)  Outcome: Progressing  Goal: Absence of Hospital-Acquired Illness or Injury  Outcome: Progressing  Goal: Optimal Comfort and Wellbeing  Outcome: Progressing  Goal: Readiness for Transition of Care  Outcome: Progressing     Problem: Wound  Goal: Optimal Coping  Outcome: Progressing  Goal: Optimal Functional Ability  Outcome: Progressing  Goal: Absence of Infection Signs and Symptoms  Outcome: Progressing  Goal: Improved Oral Intake  Outcome: Progressing  Goal: Optimal Pain Control and Function  Outcome: Progressing  Goal: Skin Health and Integrity  Outcome: Progressing  Goal: Optimal Wound Healing  Outcome: Progressing     Problem: Skin Injury Risk Increased  Goal: Skin Health and Integrity  Outcome: Progressing     Problem: Bariatric Environmental Safety  Goal: Safety Maintained with Care  Outcome: Progressing     Problem: Fall Injury Risk  Goal: Absence of Fall and Fall-Related Injury  Outcome: Progressing

## 2024-12-04 NOTE — PLAN OF CARE
Problem: Infection  Goal: Absence of Infection Signs and Symptoms  Outcome: Progressing     Problem: Adult Inpatient Plan of Care  Goal: Plan of Care Review  Outcome: Progressing  Goal: Patient-Specific Goal (Individualized)  Outcome: Progressing  Goal: Absence of Hospital-Acquired Illness or Injury  Outcome: Progressing  Goal: Optimal Comfort and Wellbeing  Outcome: Progressing  Goal: Readiness for Transition of Care  Outcome: Progressing     Problem: Wound  Goal: Optimal Coping  Outcome: Progressing  Goal: Optimal Functional Ability  Outcome: Progressing  Goal: Absence of Infection Signs and Symptoms  Outcome: Progressing  Goal: Improved Oral Intake  Outcome: Progressing  Goal: Optimal Pain Control and Function  Outcome: Progressing  Goal: Skin Health and Integrity  Outcome: Progressing  Goal: Optimal Wound Healing  Outcome: Progressing     Problem: Skin Injury Risk Increased  Goal: Skin Health and Integrity  Outcome: Progressing     Problem: Bariatric Environmental Safety  Goal: Safety Maintained with Care  Outcome: Progressing

## 2024-12-04 NOTE — PROGRESS NOTES
[facility] - 5th Floor Med Surg  Wound Care    Patient Name:  Landon Aragon   MRN:  [id number]  Date: 12/4/2024  Diagnosis: <principal problem not specified>    History:     Past Medical History:   Diagnosis Date    Anticoagulant long-term use     BPH (benign prostatic hyperplasia)     CHF (congestive heart failure)     Dependent for wheelchair mobility     Diabetes mellitus     Hard of hearing     High cholesterol     Hypertension     PAD (peripheral artery disease)     Renal disorder     Due to administration of iv Tobramycin. pt went into renal failure.    Requires continuous at home supplemental oxygen     due to CHF. Pt does not have COPD.    Thyroid disease     Wound of right foot        Social History     Socioeconomic History    Marital status: Single   Tobacco Use    Smoking status: Never    Smokeless tobacco: Current     Types: Chew   Substance and Sexual Activity    Alcohol use: Not Currently    Drug use: Not Currently    Sexual activity: Not Currently     Social Drivers of Health     Financial Resource Strain: Low Risk  (12/3/2024)    Overall Financial Resource Strain (CARDIA)     Difficulty of Paying Living Expenses: Not hard at all   Food Insecurity: No Food Insecurity (12/3/2024)    Hunger Vital Sign     Worried About Running Out of Food in the Last Year: Never true     Ran Out of Food in the Last Year: Never true   Transportation Needs: No Transportation Needs (12/3/2024)    TRANSPORTATION NEEDS     Transportation : No   Physical Activity: Inactive (11/22/2024)    Exercise Vital Sign     Days of Exercise per Week: 0 days     Minutes of Exercise per Session: 0 min   Stress: No Stress Concern Present (12/3/2024)    [employer] - Occupational Stress Questionnaire     Feeling of Stress : Not at all   Housing Stability: Low Risk  (12/3/2024)    Housing Stability Vital Sign     Unable to Pay for Housing in the Last Year: No     Homeless in the Last Year: No        Precautions:     Allergies as of 12/02/2024 - Reviewed 12/02/2024   Allergen Reaction Noted    Tobramycin Shortness Of Breath 01/08/2023       [facility] Assessment Details/Treatment      12/03/24 0924   WO Assessment   Visit Date 12/04/24   Visit Time 0924   Consult Type New   [facility] Speciality Wound   Intervention chart review;assessed;applied;orders   Teaching on-going        Wound 12/03/24 Coccyx   Date First Assessed: 12/03/24   Present on Original Admission: Yes  Location: Coccyx   Wound Image         Wound 12/03/24 Other (comment) medial Upper quadrant   Date First Assessed: 12/03/24   Primary Wound Type: (c) Other (comment)  Orientation: medial  Location: Upper quadrant   Wound Image    Dressing Appearance Open to air   Drainage Amount None   Drainage Characteristics/Odor No odor   Appearance Pink;Maroon;Dry;Other (see comments)  (Brown)   Tissue loss description Partial thickness   Black (%), Wound Tissue Color 50 %   Red (%), Wound Tissue Color 50 %   Yellow (%), Wound Tissue Color 0 %   Periwound Area Dry   Wound Edges Undefined;Irregular   Care Cleansed with:;Soap and water       WO consulted for sacrum and stomach. Discussed plan of care with nurse Gaetano at bedside. Introduced self and explained reason for visit, patient agreeable to be seen. Treatment recommendations to be put in place. Sacrum: Cleanse with vashe, dry well. Pack with vashe moistened gauze BID and PRN. Left butt: Cleanse with vashe, dry well. Apply piece of aquacell AG. Cover with abd pad BID and PRN. Stomach: Cleanse with vashe, dry well. Leave LIZETH. Nursing to continue with treatment recommendations and other preventative measures. Educated the patient and staff on the importance of offloading and maintaining good hygiene regimen, they verbalized understanding. DANIEL mattress to be ordered with sizewise. Answered all questions to the satisfaction of the patient. Will follow up.

## 2024-12-04 NOTE — PROGRESS NOTES
.UROLOGY  PROGRESS  NOTE    Landon Aragon 1950  [id number]  12/4/2024    Patient sitting up in bed, has no new complaints.  Vital signs are stable, afebrile.  Labs reviewed.    Exam:    NAD  Resp unlabored  Abd soft, NTND   urine very light yellow with minimal bloody remnant with very minimal CBI      Recent Results (from the past 24 hours)   POCT glucose    Collection Time: 12/03/24 11:31 AM   Result Value Ref Range    POCT Glucose 335 (H) 70 - 110 mg/dL   Hemoglobin and Hematocrit    Collection Time: 12/03/24 11:51 AM   Result Value Ref Range    Hgb 7.9 (L) 14.0 - 18.0 g/dL    Hct 25.3 (L) 42.0 - 52.0 %   POCT glucose    Collection Time: 12/03/24  4:28 PM   Result Value Ref Range    POCT Glucose 384 (H) 70 - 110 mg/dL   Hemoglobin and Hematocrit    Collection Time: 12/03/24  7:20 PM   Result Value Ref Range    Hgb 7.9 (L) 14.0 - 18.0 g/dL    Hct 25.2 (L) 42.0 - 52.0 %   POCT glucose    Collection Time: 12/03/24  7:41 PM   Result Value Ref Range    POCT Glucose 325 (H) 70 - 110 mg/dL   POCT glucose    Collection Time: 12/03/24 11:53 PM   Result Value Ref Range    POCT Glucose 289 (H) 70 - 110 mg/dL   Hemoglobin and Hematocrit    Collection Time: 12/04/24 12:16 AM   Result Value Ref Range    Hgb 7.8 (L) 14.0 - 18.0 g/dL    Hct 25.2 (L) 42.0 - 52.0 %   Hemoglobin and Hematocrit    Collection Time: 12/04/24  3:47 AM   Result Value Ref Range    Hgb 7.8 (L) 14.0 - 18.0 g/dL    Hct 25.2 (L) 42.0 - 52.0 %   POCT glucose    Collection Time: 12/04/24  4:40 AM   Result Value Ref Range    POCT Glucose 278 (H) 70 - 110 mg/dL         Assessment:  Hematuria  -resolved  -H&H 7.8 and 25.2  -S/p cysto with clot evacuation and fulguration 10/8 and 11/23       Plan:  CBI clamped.  Continue holding anticoagulation for now.  Discussed with nurse to restart CBI if hematuria recurs.  Okay to hand irrigate if needed.  Transfusion/anemia management per hospitalist.    Hilaria Patel, OMIDP

## 2024-12-05 VITALS
OXYGEN SATURATION: 98 % | HEIGHT: 60 IN | DIASTOLIC BLOOD PRESSURE: 68 MMHG | SYSTOLIC BLOOD PRESSURE: 159 MMHG | BODY MASS INDEX: 49.08 KG/M2 | HEART RATE: 61 BPM | RESPIRATION RATE: 18 BRPM | TEMPERATURE: 99 F | WEIGHT: 250 LBS

## 2024-12-05 LAB
ALBUMIN SERPL-MCNC: 2.1 G/DL (ref 3.4–4.8)
ALBUMIN/GLOB SERPL: 0.5 RATIO (ref 1.1–2)
ALP SERPL-CCNC: 126 UNIT/L (ref 40–150)
ALT SERPL-CCNC: 22 UNIT/L (ref 0–55)
ANION GAP SERPL CALC-SCNC: 8 MEQ/L
AST SERPL-CCNC: 17 UNIT/L (ref 5–34)
BASOPHILS # BLD AUTO: 0.06 X10(3)/MCL
BASOPHILS NFR BLD AUTO: 0.6 %
BILIRUB SERPL-MCNC: 0.4 MG/DL
BUN SERPL-MCNC: 28.4 MG/DL (ref 8.4–25.7)
CALCIUM SERPL-MCNC: 8.1 MG/DL (ref 8.8–10)
CHLORIDE SERPL-SCNC: 99 MMOL/L (ref 98–107)
CO2 SERPL-SCNC: 28 MMOL/L (ref 23–31)
CREAT SERPL-MCNC: 1.13 MG/DL (ref 0.72–1.25)
CREAT/UREA NIT SERPL: 25
EOSINOPHIL # BLD AUTO: 0.5 X10(3)/MCL (ref 0–0.9)
EOSINOPHIL NFR BLD AUTO: 4.7 %
ERYTHROCYTE [DISTWIDTH] IN BLOOD BY AUTOMATED COUNT: 16 % (ref 11.5–17)
GFR SERPLBLD CREATININE-BSD FMLA CKD-EPI: >60 ML/MIN/1.73/M2
GLOBULIN SER-MCNC: 4.4 GM/DL (ref 2.4–3.5)
GLUCOSE SERPL-MCNC: 330 MG/DL (ref 82–115)
HCT VFR BLD AUTO: 26.4 % (ref 42–52)
HGB BLD-MCNC: 8.2 G/DL (ref 14–18)
IMM GRANULOCYTES # BLD AUTO: 0.14 X10(3)/MCL (ref 0–0.04)
IMM GRANULOCYTES NFR BLD AUTO: 1.3 %
LYMPHOCYTES # BLD AUTO: 2.38 X10(3)/MCL (ref 0.6–4.6)
LYMPHOCYTES NFR BLD AUTO: 22.4 %
MAGNESIUM SERPL-MCNC: 2 MG/DL (ref 1.6–2.6)
MCH RBC QN AUTO: 26.1 PG (ref 27–31)
MCHC RBC AUTO-ENTMCNC: 31.1 G/DL (ref 33–36)
MCV RBC AUTO: 84.1 FL (ref 80–94)
MONOCYTES # BLD AUTO: 0.91 X10(3)/MCL (ref 0.1–1.3)
MONOCYTES NFR BLD AUTO: 8.6 %
NEUTROPHILS # BLD AUTO: 6.63 X10(3)/MCL (ref 2.1–9.2)
NEUTROPHILS NFR BLD AUTO: 62.4 %
NRBC BLD AUTO-RTO: 0 %
PLATELET # BLD AUTO: 343 X10(3)/MCL (ref 130–400)
PMV BLD AUTO: 9.8 FL (ref 7.4–10.4)
POCT GLUCOSE: 344 MG/DL (ref 70–110)
POCT GLUCOSE: 349 MG/DL (ref 70–110)
POCT GLUCOSE: 360 MG/DL (ref 70–110)
POCT GLUCOSE: 405 MG/DL (ref 70–110)
POTASSIUM SERPL-SCNC: 4.4 MMOL/L (ref 3.5–5.1)
PROT SERPL-MCNC: 6.5 GM/DL (ref 5.8–7.6)
RBC # BLD AUTO: 3.14 X10(6)/MCL (ref 4.7–6.1)
SODIUM SERPL-SCNC: 135 MMOL/L (ref 136–145)
WBC # BLD AUTO: 10.62 X10(3)/MCL (ref 4.5–11.5)

## 2024-12-05 PROCEDURE — 36415 COLL VENOUS BLD VENIPUNCTURE: CPT | Performed by: INTERNAL MEDICINE

## 2024-12-05 PROCEDURE — 80053 COMPREHEN METABOLIC PANEL: CPT | Performed by: INTERNAL MEDICINE

## 2024-12-05 PROCEDURE — 25000003 PHARM REV CODE 250: Performed by: HOSPITALIST

## 2024-12-05 PROCEDURE — 25000003 PHARM REV CODE 250: Performed by: INTERNAL MEDICINE

## 2024-12-05 PROCEDURE — 85025 COMPLETE CBC W/AUTO DIFF WBC: CPT | Performed by: INTERNAL MEDICINE

## 2024-12-05 PROCEDURE — 83735 ASSAY OF MAGNESIUM: CPT | Performed by: INTERNAL MEDICINE

## 2024-12-05 PROCEDURE — 63600175 PHARM REV CODE 636 W HCPCS: Performed by: INTERNAL MEDICINE

## 2024-12-05 PROCEDURE — 27000221 HC OXYGEN, UP TO 24 HOURS

## 2024-12-05 PROCEDURE — 63600175 PHARM REV CODE 636 W HCPCS: Performed by: NURSE PRACTITIONER

## 2024-12-05 RX ORDER — INSULIN GLARGINE 100 [IU]/ML
20 INJECTION, SOLUTION SUBCUTANEOUS 2 TIMES DAILY
Status: DISCONTINUED | OUTPATIENT
Start: 2024-12-05 | End: 2024-12-05 | Stop reason: HOSPADM

## 2024-12-05 RX ORDER — AMLODIPINE BESYLATE 5 MG/1
5 TABLET ORAL DAILY
Status: DISCONTINUED | OUTPATIENT
Start: 2024-12-05 | End: 2024-12-05 | Stop reason: HOSPADM

## 2024-12-05 RX ADMIN — LEVOTHYROXINE SODIUM 150 MCG: 150 TABLET ORAL at 05:12

## 2024-12-05 RX ADMIN — INSULIN GLARGINE 20 UNITS: 100 INJECTION, SOLUTION SUBCUTANEOUS at 05:12

## 2024-12-05 RX ADMIN — INSULIN ASPART 8 UNITS: 100 INJECTION, SOLUTION INTRAVENOUS; SUBCUTANEOUS at 10:12

## 2024-12-05 RX ADMIN — FAMOTIDINE 20 MG: 20 TABLET, FILM COATED ORAL at 08:12

## 2024-12-05 RX ADMIN — PREGABALIN 50 MG: 50 CAPSULE ORAL at 08:12

## 2024-12-05 RX ADMIN — INSULIN ASPART 10 UNITS: 100 INJECTION, SOLUTION INTRAVENOUS; SUBCUTANEOUS at 03:12

## 2024-12-05 RX ADMIN — AMIODARONE HYDROCHLORIDE 100 MG: 100 TABLET ORAL at 08:12

## 2024-12-05 RX ADMIN — TAMSULOSIN HYDROCHLORIDE 0.4 MG: 0.4 CAPSULE ORAL at 08:12

## 2024-12-05 RX ADMIN — AMLODIPINE BESYLATE 5 MG: 5 TABLET ORAL at 08:12

## 2024-12-05 RX ADMIN — INSULIN ASPART 8 UNITS: 100 INJECTION, SOLUTION INTRAVENOUS; SUBCUTANEOUS at 05:12

## 2024-12-05 NOTE — PLAN OF CARE
[facility] sent discharge information via [facility]port and handed the discharge packet to the nurse along with nurse to call report to .

## 2024-12-05 NOTE — PLAN OF CARE
12/05/24 1453   Final Note   Assessment Type Final Discharge Note   Anticipated Discharge Disposition SNF  ([facility])   Post-Acute Status   Post-Acute Authorization Placement   Post-Acute Placement Status Set-up Complete/Auth obtained   Coverage Medicare   Discharge Delays (!) Ambulance Transport/Facility Transport

## 2024-12-05 NOTE — PROGRESS NOTES
.UROLOGY  PROGRESS  NOTE    Landon Aragon 1950  [id number]  12/5/2024    No acute events or complaints. Vital signs are stable, afebrile.  Labs reviewed.    Exam:    NAD  Resp unlabored  Abd soft, NTND   urine very light yellow draining to  bag      Recent Results (from the past 24 hours)   POCT glucose    Collection Time: 12/04/24  3:26 PM   Result Value Ref Range    POCT Glucose 338 (H) 70 - 110 mg/dL   POCT glucose    Collection Time: 12/04/24  8:08 PM   Result Value Ref Range    POCT Glucose 354 (H) 70 - 110 mg/dL   Comprehensive Metabolic Panel    Collection Time: 12/05/24  4:41 AM   Result Value Ref Range    Sodium 135 (L) 136 - 145 mmol/L    Potassium 4.4 3.5 - 5.1 mmol/L    Chloride 99 98 - 107 mmol/L    CO2 28 23 - 31 mmol/L    Glucose 330 (H) 82 - 115 mg/dL    Blood Urea Nitrogen 28.4 (H) 8.4 - 25.7 mg/dL    Creatinine 1.13 0.72 - 1.25 mg/dL    Calcium 8.1 (L) 8.8 - 10.0 mg/dL    Protein Total 6.5 5.8 - 7.6 gm/dL    Albumin 2.1 (L) 3.4 - 4.8 g/dL    Globulin 4.4 (H) 2.4 - 3.5 gm/dL    Albumin/Globulin Ratio 0.5 (L) 1.1 - 2.0 ratio    Bilirubin Total 0.4 <=1.5 mg/dL     40 - 150 unit/L    ALT 22 0 - 55 unit/L    AST 17 5 - 34 unit/L    eGFR >60 mL/min/1.73/m2    Anion Gap 8.0 mEq/L    BUN/Creatinine Ratio 25    Magnesium    Collection Time: 12/05/24  4:41 AM   Result Value Ref Range    Magnesium Level 2.00 1.60 - 2.60 mg/dL   CBC with Differential    Collection Time: 12/05/24  4:41 AM   Result Value Ref Range    WBC 10.62 4.50 - 11.50 x10(3)/mcL    RBC 3.14 (L) 4.70 - 6.10 x10(6)/mcL    Hgb 8.2 (L) 14.0 - 18.0 g/dL    Hct 26.4 (L) 42.0 - 52.0 %    MCV 84.1 80.0 - 94.0 fL    MCH 26.1 (L) 27.0 - 31.0 pg    MCHC 31.1 (L) 33.0 - 36.0 g/dL    RDW 16.0 11.5 - 17.0 %    Platelet 343 130 - 400 x10(3)/mcL    MPV 9.8 7.4 - 10.4 fL    Neut % 62.4 %    Lymph % 22.4 %    Mono % 8.6 %    Eos % 4.7 %    Basophil % 0.6 %    Lymph # 2.38 0.6 - 4.6 x10(3)/mcL    Neut # 6.63 2.1 - 9.2 x10(3)/mcL    Mono # 0.91  0.1 - 1.3 x10(3)/mcL    Eos # 0.50 0 - 0.9 x10(3)/mcL    Baso # 0.06 <=0.2 x10(3)/mcL    IG# 0.14 (H) 0 - 0.04 x10(3)/mcL    IG% 1.3 %    NRBC% 0.0 %   POCT glucose    Collection Time: 12/05/24  5:04 AM   Result Value Ref Range    POCT Glucose 344 (H) 70 - 110 mg/dL   POCT glucose    Collection Time: 12/05/24 10:32 AM   Result Value Ref Range    POCT Glucose 349 (H) 70 - 110 mg/dL         Assessment:  Hematuria  -resolved  -H&H 8.2 & 26.4  -S/p cysto with clot evacuation and fulguration 10/8 and 11/23       Plan:  Can plug irrigation port. Patient can be discharged from a  standpoint. Can restart Eliquis in the next few days if urine remains clear.     CHRIS Ravi

## 2024-12-05 NOTE — PROGRESS NOTES
[facility] Medicine Progress Note        Chief Complaint: Inpatient Follow-up for recurrent hematuria    HPI:  The patient is a 74-year-old male with a history of atrial fibrillation on Eliquis, BPH, CHF, diabetes, hyperlipidemia, hypertension, CAD, pressure ulcers, PID status post right AKA and left BKA with a history of recurrent hematuria status post cystoscopy with clot evacuation multiple locations..  He is/cystoscopy that is around 2 10/08/2024 and repeated again on 11/23.  He was recently discharged on 11/29, Eliquis was resumed and he returns now with gross hematuria.  Patient has been placed on CBI, Eliquis on hold and his urine is clearing.    Interval Hx:     12/04/2024-much improved urine, continue to monitor/pink urine    Case was discussed with patient's nurse and  on the floor.    Objective/physical exam:  General: In no acute distress, afebrile  Chest: Clear to auscultation bilaterally  Heart:  Regular rate and rhythm +S1, S2, no appreciable murmur  Abdomen: Soft, nontender, BS +  MSK: Warm, left BKA/right AKA  Neurologic: Alert and oriented x4, Cranial nerve II-XII intact, Strength 5/5 in all 4 extremities    VITAL SIGNS: 24 HRS MIN & MAX LAST   Temp  Min: 97.9 °F (36.6 °C)  Max: 98.7 °F (37.1 °C) 98.5 °F (36.9 °C)   BP  Min: 119/51  Max: 155/59 (!) 148/62   Pulse  Min: 53  Max: 58  (!) 57   Resp  Min: 16  Max: 19 16   SpO2  Min: 96 %  Max: 100 % 100 %     I have reviewed the following labs:  Recent Labs   Lab 12/02/24  0404 12/02/24  2127 12/03/24  0443 12/03/24  1151 12/03/24  1920 12/04/24  0016 12/04/24  0347   WBC 10.70 10.75 10.16  --   --   --   --    RBC 3.13* 3.06* 3.32*  --   --   --   --    HGB 8.4* 8.2* 8.7*   < > 7.9* 7.8* 7.8*   HCT 26.9* 25.4* 28.1*   < > 25.2* 25.2* 25.2*   MCV 85.9 83.0 84.6  --   --   --   --    MCH 26.8* 26.8* 26.2*  --   --   --   --    MCHC 31.2* 32.3* 31.0*  --   --   --   --    RDW 15.9 15.9 15.9  --   --    --   --     300 341  --   --   --   --    MPV 10.1 9.7 9.9  --   --   --   --     < > = values in this interval not displayed.     Recent Labs   Lab 12/02/24 0404 12/02/24 2127 12/03/24  0443    133* 136   K 4.1 4.1 3.9   CL 97* 95* 96*   CO2 31 29 29   BUN 28.7* 32.6* 31.3*   CREATININE 1.15 1.43* 1.31*   CALCIUM 8.1* 8.6* 8.1*   MG  --   --  1.80   ALBUMIN 2.1* 2.1* 2.3*   ALKPHOS 126 147 143   ALT 24 26 23   AST 20 24 18   BILITOT 0.3 0.3 0.3     Microbiology Results (last 7 days)       ** No results found for the last 168 hours. **             See below for Radiology    Assessment/Plan:  1.-chronic AFib on Eliquis with recurrent hematuria status post multiple cystoscopy with fulguration  -presents with acute hematuria, Eliquis on hold and urine clearing with CBI    2.-chronic indwelling Sharp catheter    3.-BPH  4.-CHF  5. Diabetes mellitus  6. Hypertension   7. PAD  8. CKD     Plan-continue CBI, continue to hold Eliquis.  We will go over medications since patient is slightly hypertensive.  A.m. labs.  Resume Norvasc 5 mg p.o. daily, resume Lantus at 20 units subQ q.h.s.    VTE prophylaxis:  SCDs  Patient condition:  Stable  Anticipated discharge and Disposition:         All diagnosis and differential diagnosis have been reviewed; assessment and plan has been documented; I have personally reviewed the labs and test results that are presently available; I have reviewed the patients medication list; I have reviewed the consulting providers response and recommendations. I have reviewed or attempted to review medical records based upon their availability    All of the patient's questions have been  addressed and answered. Patient's is agreeable to the above stated plan. I will continue to monitor closely and make adjustments to medical management as needed.    Portions of this note dictated using EMR integrated voice recognition software, and may be subject to voice recognition errors not corrected at  proofreading. Please contact writer for clarification if needed.   _____________________________________________________________________    Malnutrition Status:  Nutrition consulted. Most recent weight and BMI monitored-     Measurements:  Wt Readings from Last 1 Encounters:   12/03/24 113.4 kg (250 lb)   Body mass index is 48.82 kg/m².    Patient has been screened and assessed by RD.    Malnutrition Type:  Context:    Level:      Malnutrition Characteristic Summary:       Interventions/Recommendations (treatment strategy):        Scheduled Med:   amiodarone  100 mg Oral Daily    famotidine  20 mg Oral Daily    levothyroxine  150 mcg Oral Before breakfast    metoprolol succinate  25 mg Oral Daily    pregabalin  50 mg Oral BID    tamsulosin  0.4 mg Oral Daily      Continuous Infusions:     PRN Meds:    Current Facility-Administered Medications:     acetaminophen, 650 mg, Oral, Q6H PRN    aluminum-magnesium hydroxide-simethicone, 30 mL, Oral, QID PRN    ammonium lactate, , Topical (Top), BID PRN    dextrose 10%, 12.5 g, Intravenous, PRN    dextrose 10%, 25 g, Intravenous, PRN    glucagon (human recombinant), 1 mg, Intramuscular, PRN    glucose, 16 g, Oral, PRN    glucose, 24 g, Oral, PRN    HYDROcodone-acetaminophen, 1 tablet, Oral, Q6H PRN    hyoscyamine, 0.125 mg, Sublingual, Q4H PRN    insulin aspart U-100, 0-10 Units, Subcutaneous, QID (AC + HS) PRN    melatonin, 6 mg, Oral, Nightly PRN    midazolam, 2 mg, Intravenous, Once PRN    naloxone, 0.02 mg, Intravenous, PRN    ondansetron, 4 mg, Intravenous, Q4H PRN    polyethylene glycol, 17 g, Oral, BID PRN    prochlorperazine, 5 mg, Intravenous, Q6H PRN    simethicone, 1 tablet, Oral, QID PRN     Radiology:  I have personally reviewed the following imaging and agree with the radiologist.     X-Ray Chest PA Lateral With Lordotic Vie  Indication: Preoperative respiratory evaluation     Findings: No prior studies are available for comparison. Heart size is  normal and  lungs are clear bilaterally. Pulmonary vasculature is  normal.     IMPRESSION: No evidence of acute disease.        Electronically Signed By: Boone Burdick MD  Date/Time Signed: 08/13/2018 16:44      Ino Marroquin MD  Department of Hospital Medicine   [facility]   12/05/2024

## 2024-12-05 NOTE — NURSING
Patient discharged from floor via wheelchair per nursing home transport staff. Patient and VSS, no sign of acute distress. Peripheral IV d/c'd with catheter tip intact, pressure applied to site bleeding controlled, dressing secured. CBI disconnected and heath port plugged with stopper device. Discharged paperwork prepared by CM and sent with NH transport staff. Released into care of transport staff via facility vehicle in stable condition.     BP (!) 159/68   Pulse 61   Temp 99 °F (37.2 °C) (Oral)   Resp 18   Ht 5' (1.524 m)   Wt 113.4 kg (250 lb)   SpO2 98%   BMI 48.82 kg/m²

## 2024-12-06 ENCOUNTER — LAB REQUISITION (OUTPATIENT)
Dept: LAB | Facility: HOSPITAL | Age: 74
End: 2024-12-06
Payer: MEDICARE

## 2024-12-06 DIAGNOSIS — M62.58 MUSCLE WASTING AND ATROPHY, NOT ELSEWHERE CLASSIFIED, OTHER SITE: ICD-10-CM

## 2024-12-06 LAB
25(OH)D3+25(OH)D2 SERPL-MCNC: 22 NG/ML (ref 30–80)
ALBUMIN SERPL-MCNC: 2.2 G/DL (ref 3.4–4.8)
ALBUMIN/GLOB SERPL: 0.5 RATIO (ref 1.1–2)
ALP SERPL-CCNC: 133 UNIT/L (ref 40–150)
ALT SERPL-CCNC: 21 UNIT/L (ref 0–55)
ANION GAP SERPL CALC-SCNC: 8 MEQ/L
AST SERPL-CCNC: 18 UNIT/L (ref 5–34)
BASOPHILS # BLD AUTO: 0.06 X10(3)/MCL
BASOPHILS NFR BLD AUTO: 0.5 %
BILIRUB SERPL-MCNC: 0.4 MG/DL
BUN SERPL-MCNC: 28.9 MG/DL (ref 8.4–25.7)
CALCIUM SERPL-MCNC: 8.3 MG/DL (ref 8.8–10)
CHLORIDE SERPL-SCNC: 97 MMOL/L (ref 98–107)
CHOLEST SERPL-MCNC: 87 MG/DL
CHOLEST/HDLC SERPL: 5 {RATIO} (ref 0–5)
CO2 SERPL-SCNC: 30 MMOL/L (ref 23–31)
CREAT SERPL-MCNC: 1.13 MG/DL (ref 0.72–1.25)
CREAT/UREA NIT SERPL: 26
EOSINOPHIL # BLD AUTO: 0.51 X10(3)/MCL (ref 0–0.9)
EOSINOPHIL NFR BLD AUTO: 4.6 %
ERYTHROCYTE [DISTWIDTH] IN BLOOD BY AUTOMATED COUNT: 15.9 % (ref 11.5–17)
EST. AVERAGE GLUCOSE BLD GHB EST-MCNC: 148.5 MG/DL
FERRITIN SERPL-MCNC: 178.24 NG/ML (ref 21.81–274.66)
GFR SERPLBLD CREATININE-BSD FMLA CKD-EPI: >60 ML/MIN/1.73/M2
GLOBULIN SER-MCNC: 4.6 GM/DL (ref 2.4–3.5)
GLUCOSE SERPL-MCNC: 242 MG/DL (ref 82–115)
HBA1C MFR BLD: 6.8 %
HCT VFR BLD AUTO: 26.3 % (ref 42–52)
HDLC SERPL-MCNC: 19 MG/DL (ref 35–60)
HGB BLD-MCNC: 7.9 G/DL (ref 14–18)
IMM GRANULOCYTES # BLD AUTO: 0.14 X10(3)/MCL (ref 0–0.04)
IMM GRANULOCYTES NFR BLD AUTO: 1.3 %
IRON SATN MFR SERPL: 15 % (ref 20–50)
IRON SERPL-MCNC: 25 UG/DL (ref 65–175)
LDLC SERPL CALC-MCNC: 44 MG/DL (ref 50–140)
LYMPHOCYTES # BLD AUTO: 2.82 X10(3)/MCL (ref 0.6–4.6)
LYMPHOCYTES NFR BLD AUTO: 25.3 %
MCH RBC QN AUTO: 25.8 PG (ref 27–31)
MCHC RBC AUTO-ENTMCNC: 30 G/DL (ref 33–36)
MCV RBC AUTO: 85.9 FL (ref 80–94)
MONOCYTES # BLD AUTO: 0.87 X10(3)/MCL (ref 0.1–1.3)
MONOCYTES NFR BLD AUTO: 7.8 %
NEUTROPHILS # BLD AUTO: 6.74 X10(3)/MCL (ref 2.1–9.2)
NEUTROPHILS NFR BLD AUTO: 60.5 %
NRBC BLD AUTO-RTO: 0 %
PLATELET # BLD AUTO: 362 X10(3)/MCL (ref 130–400)
PMV BLD AUTO: 10 FL (ref 7.4–10.4)
POTASSIUM SERPL-SCNC: 4.5 MMOL/L (ref 3.5–5.1)
PREALB SERPL-MCNC: 12.5 MG/DL (ref 16–42)
PROT SERPL-MCNC: 6.8 GM/DL (ref 5.8–7.6)
RBC # BLD AUTO: 3.06 X10(6)/MCL (ref 4.7–6.1)
SODIUM SERPL-SCNC: 135 MMOL/L (ref 136–145)
TIBC SERPL-MCNC: 145 UG/DL (ref 60–240)
TIBC SERPL-MCNC: 170 UG/DL (ref 250–450)
TRANSFERRIN SERPL-MCNC: 153 MG/DL (ref 163–344)
TRIGL SERPL-MCNC: 120 MG/DL (ref 34–140)
TSH SERPL-ACNC: 4.6 UIU/ML (ref 0.35–4.94)
VLDLC SERPL CALC-MCNC: 24 MG/DL
WBC # BLD AUTO: 11.14 X10(3)/MCL (ref 4.5–11.5)

## 2024-12-06 PROCEDURE — 84134 ASSAY OF PREALBUMIN: CPT | Performed by: NURSE PRACTITIONER

## 2024-12-06 PROCEDURE — 82306 VITAMIN D 25 HYDROXY: CPT | Performed by: NURSE PRACTITIONER

## 2024-12-06 PROCEDURE — 80053 COMPREHEN METABOLIC PANEL: CPT | Performed by: NURSE PRACTITIONER

## 2024-12-06 PROCEDURE — 80061 LIPID PANEL: CPT | Performed by: NURSE PRACTITIONER

## 2024-12-06 PROCEDURE — 83540 ASSAY OF IRON: CPT | Performed by: NURSE PRACTITIONER

## 2024-12-06 PROCEDURE — 85025 COMPLETE CBC W/AUTO DIFF WBC: CPT | Performed by: NURSE PRACTITIONER

## 2024-12-06 PROCEDURE — 82728 ASSAY OF FERRITIN: CPT | Performed by: NURSE PRACTITIONER

## 2024-12-06 PROCEDURE — 83036 HEMOGLOBIN GLYCOSYLATED A1C: CPT | Performed by: NURSE PRACTITIONER

## 2024-12-06 PROCEDURE — 84443 ASSAY THYROID STIM HORMONE: CPT | Performed by: NURSE PRACTITIONER

## 2024-12-06 NOTE — DISCHARGE SUMMARY
[facility] Hospital Medicine Discharge Summary    Admit Date: 12/2/2024  Discharge Date and Time: 12/5/20247:52 PM  Admitting Physician: THUAN Team  Discharging Physician: Ariel Walker MD.  Primary Care Physician: Mat Taylor FNP  Consults: Urology    Discharge Diagnoses:  #Recurrent hematuria status post multiple cystoscopy with fulguration last 11/23  #Chronic AFib on Eliquis   #Chronic indwelling Sharp catheter  #BPH  #CHF  #Diabetes mellitus  #Hypertension   #PAD  #CKD     Hospital Course:   The patient is a 74-year-old male with a history of atrial fibrillation on Eliquis, BPH, CHF, diabetes, hyperlipidemia, hypertension, CAD, pressure ulcers, PID status post right AKA and left BKA with a history of recurrent hematuria status post cystoscopy with clot evacuation multiple locations.. He is/cystoscopy that is around 2 10/08/2024 and repeated again on 11/23. He was recently discharged on 11/29, Eliquis was resumed and he returns now with gross hematuria. Patient was placed on CBI, Eliquis held and his urine clearing during hospital stay. CBI stopped. Urology okay with discharge to NH. Eliquis hold for now, restart on 12/6/2024 evening per urology recommendation given hematuria (holding for 72 hrs).  If recurrent episode of hematuria after restarting eliquis again, will need further urologic eval/ procedure to look for source of bleed. Discussed with urology and patient in detail.    Discussed regarding ER precautions and when to return to ER if needed.  Labs in 1 week following discharge with primary care physician. F/u urology.  Advised patient to follow up with outpt cardiology Dr. Hein.    Pt was seen and examined on the day of discharge  Vitals:  VITAL SIGNS: 24 HRS MIN & MAX LAST   Temp  Min: 98 °F (36.7 °C)  Max: 99 °F (37.2 °C) 99 °F (37.2 °C)   BP  Min: 111/53  Max: 159/68 (!) 159/68   Pulse  Min: 52  Max: 61  61   Resp  Min: 16  Max: 19 18   SpO2  Min: 97 %  Max:  100 % 98 %       Physical Exam:  General: In no acute distress, afebrile  Chest: Clear to auscultation bilaterally  Heart:  Regular rate and rhythm +S1, S2, no appreciable murmur  Abdomen: Soft, nontender, BS +  MSK: Warm, left BKA/right AKA  Neurologic: Alert and oriented x4, Cranial nerve II-XII intact, Strength 5/5 in all 4 extremities    Procedures Performed: No admission procedures for hospital encounter.     Significant Diagnostic Studies: See Full reports for all details    Recent Labs   Lab 12/02/24 2127 12/03/24 0443 12/03/24  1151 12/04/24  0016 12/04/24  0347 12/05/24 0441   WBC 10.75 10.16  --   --   --  10.62   RBC 3.06* 3.32*  --   --   --  3.14*   HGB 8.2* 8.7*   < > 7.8* 7.8* 8.2*   HCT 25.4* 28.1*   < > 25.2* 25.2* 26.4*   MCV 83.0 84.6  --   --   --  84.1   MCH 26.8* 26.2*  --   --   --  26.1*   MCHC 32.3* 31.0*  --   --   --  31.1*   RDW 15.9 15.9  --   --   --  16.0    341  --   --   --  343   MPV 9.7 9.9  --   --   --  9.8    < > = values in this interval not displayed.       Recent Labs   Lab 12/02/24 2127 12/03/24 0443 12/05/24 0441   * 136 135*   K 4.1 3.9 4.4   CL 95* 96* 99   CO2 29 29 28   BUN 32.6* 31.3* 28.4*   CREATININE 1.43* 1.31* 1.13   CALCIUM 8.6* 8.1* 8.1*   MG  --  1.80 2.00   ALBUMIN 2.1* 2.3* 2.1*   ALKPHOS 147 143 126   ALT 26 23 22   AST 24 18 17   BILITOT 0.3 0.3 0.4        Microbiology Results (last 7 days)       ** No results found for the last 168 hours. **             X-Ray Chest PA Lateral With Lordotic Vie  Indication: Preoperative respiratory evaluation     Findings: No prior studies are available for comparison. Heart size is  normal and lungs are clear bilaterally. Pulmonary vasculature is  normal.     IMPRESSION: No evidence of acute disease.        Electronically Signed By: Boone Burdick MD  Date/Time Signed: 08/13/2018 16:44         Medication List        CHANGE how you take these medications      apixaban 5 mg Tab  Commonly known as:  ELIQUIS  Take 1 tablet (5 mg total) by mouth 2 (two) times daily. Hold for now, restart on 12/6/2024 evening per urology recommendation given hematuria (holding for 72 hrs)  What changed: additional instructions            CONTINUE taking these medications      aluminum & magnesium hydroxide-simethicone 400-400-40 mg/5 mL suspension  Commonly known as: MYLANTA MAX STRENGTH     amiodarone 100 MG Tab  Commonly known as: PACERONE     amLODIPine 5 MG tablet  Commonly known as: NORVASC     aspirin 81 MG Chew     docusate sodium 100 MG capsule  Commonly known as: COLACE     ENTRESTO 24-26 mg per tablet  Generic drug: sacubitriL-valsartan     famotidine 20 MG tablet  Commonly known as: PEPCID     ferrous gluconate 324 MG tablet  Commonly known as: FERGON     fluticasone propionate 50 mcg/actuation nasal spray  Commonly known as: FLONASE     furosemide 80 MG tablet  Commonly known as: LASIX     HYDROcodone-acetaminophen 5-325 mg per tablet  Commonly known as: NORCO  Take 1 tablet by mouth every 6 (six) hours as needed for Pain.     hyoscyamine 0.125 mg Subl  Place 1 tablet (0.125 mg total) under the tongue every 6 (six) hours as needed (bladder spasms).     insulin regular 100 unit/mL injection     LANTUS U-100 INSULIN 100 unit/mL injection  Generic drug: insulin glargine U-100 (Lantus)  Inject 35 Units into the skin 2 (two) times a day.     levothyroxine 75 MCG tablet  Commonly known as: SYNTHROID     metoprolol succinate 25 mg Cspx     nitrofurantoin (macrocrystal-monohydrate) 100 MG capsule  Commonly known as: MACROBID  Take 1 capsule (100 mg total) by mouth every 12 (twelve) hours. for 10 days     pregabalin 75 MG capsule  Commonly known as: LYRICA     rosuvastatin 40 MG Tab  Commonly known as: CRESTOR     SENNA WITH DOCUSATE SODIUM 8.6-50 mg per tablet  Generic drug: senna-docusate 8.6-50 mg     tamsulosin 0.4 mg Cap  Commonly known as: FLOMAX            STOP taking these medications      potassium chloride 10 MEQ  Cpsr  Commonly known as: MICRO-K               Where to Get Your Medications        Information about where to get these medications is not yet available    Ask your nurse or doctor about these medications  apixaban 5 mg Tab          Explained in detail to the patient about the discharge plan, medications, and follow-up visits. Pt understands and agrees with the treatment plan  Discharge Disposition: Skilled Nursing Facility   Discharged Condition: stable  Diet-  Regular  Medications Per DC med rec  Activities as tolerated   Follow-up Information       Mat Taylor FNP Follow up in 2 week(s).    Specialty: Family Medicine  Why: F/U appoinment: Family must call to make appoinment.  Contact information:  [address],  [address]  [facility] [id number] [phone]               Caden Jerez MD Follow up in 2 week(s).    Specialty: Urology  Why: F/U appoinment: Office will call patient with follow-up appoinment  Contact information:  [address] [facility]  [address]  [facility] [address]  [phone]                           For further questions contact hospitalist office    Discharge time 33 minutes    For worsening symptoms, chest pain, shortness of breath, increased abdominal pain, high grade fever, stroke or stroke like symptoms, immediately go to the nearest Emergency Room or call 911 as soon as possible.      Ariel Mejia M.D, on 12/5/2024. at 7:52 PM.

## 2024-12-07 NOTE — PHYSICIAN QUERY
Please clarify if there is any clinical correlation between Eliquis use and hematuria. Are the conditions:  Due to or associated with each other

## 2024-12-09 ENCOUNTER — LAB REQUISITION (OUTPATIENT)
Dept: LAB | Facility: HOSPITAL | Age: 74
End: 2024-12-09
Payer: MEDICARE

## 2024-12-09 DIAGNOSIS — D64.9 ANEMIA, UNSPECIFIED: ICD-10-CM

## 2024-12-09 LAB
BASOPHILS # BLD AUTO: 0.08 X10(3)/MCL
BASOPHILS NFR BLD AUTO: 0.6 %
EOSINOPHIL # BLD AUTO: 0.8 X10(3)/MCL (ref 0–0.9)
EOSINOPHIL NFR BLD AUTO: 5.8 %
ERYTHROCYTE [DISTWIDTH] IN BLOOD BY AUTOMATED COUNT: 16.3 % (ref 11.5–17)
HCT VFR BLD AUTO: 27.3 % (ref 42–52)
HGB BLD-MCNC: 8.4 G/DL (ref 14–18)
IMM GRANULOCYTES # BLD AUTO: 0.16 X10(3)/MCL (ref 0–0.04)
IMM GRANULOCYTES NFR BLD AUTO: 1.2 %
LYMPHOCYTES # BLD AUTO: 3.47 X10(3)/MCL (ref 0.6–4.6)
LYMPHOCYTES NFR BLD AUTO: 25.1 %
MCH RBC QN AUTO: 26.2 PG (ref 27–31)
MCHC RBC AUTO-ENTMCNC: 30.8 G/DL (ref 33–36)
MCV RBC AUTO: 85 FL (ref 80–94)
MONOCYTES # BLD AUTO: 1.1 X10(3)/MCL (ref 0.1–1.3)
MONOCYTES NFR BLD AUTO: 7.9 %
NEUTROPHILS # BLD AUTO: 8.23 X10(3)/MCL (ref 2.1–9.2)
NEUTROPHILS NFR BLD AUTO: 59.4 %
NRBC BLD AUTO-RTO: 0 %
PLATELET # BLD AUTO: 345 X10(3)/MCL (ref 130–400)
PMV BLD AUTO: 10.2 FL (ref 7.4–10.4)
RBC # BLD AUTO: 3.21 X10(6)/MCL (ref 4.7–6.1)
WBC # BLD AUTO: 13.84 X10(3)/MCL (ref 4.5–11.5)

## 2024-12-09 PROCEDURE — 85025 COMPLETE CBC W/AUTO DIFF WBC: CPT | Performed by: NURSE PRACTITIONER

## 2024-12-10 ENCOUNTER — PATIENT OUTREACH (OUTPATIENT)
Dept: ADMINISTRATIVE | Facility: CLINIC | Age: 74
End: 2024-12-10
Payer: MEDICARE

## 2024-12-15 ENCOUNTER — LAB REQUISITION (OUTPATIENT)
Dept: LAB | Facility: HOSPITAL | Age: 74
End: 2024-12-15
Payer: MEDICARE

## 2024-12-15 DIAGNOSIS — I10 ESSENTIAL (PRIMARY) HYPERTENSION: ICD-10-CM

## 2024-12-15 DIAGNOSIS — I48.91 UNSPECIFIED ATRIAL FIBRILLATION: ICD-10-CM

## 2024-12-15 LAB
ALBUMIN SERPL-MCNC: 2.1 G/DL (ref 3.4–4.8)
ALBUMIN/GLOB SERPL: 0.5 RATIO (ref 1.1–2)
ALP SERPL-CCNC: 120 UNIT/L (ref 40–150)
ALT SERPL-CCNC: 27 UNIT/L (ref 0–55)
ANION GAP SERPL CALC-SCNC: 11 MEQ/L
AST SERPL-CCNC: 21 UNIT/L (ref 5–34)
BASOPHILS # BLD AUTO: 0.05 X10(3)/MCL
BASOPHILS NFR BLD AUTO: 0.4 %
BILIRUB SERPL-MCNC: 0.3 MG/DL
BUN SERPL-MCNC: 57.4 MG/DL (ref 8.4–25.7)
CALCIUM SERPL-MCNC: 8.4 MG/DL (ref 8.8–10)
CHLORIDE SERPL-SCNC: 98 MMOL/L (ref 98–107)
CO2 SERPL-SCNC: 27 MMOL/L (ref 23–31)
CREAT SERPL-MCNC: 1.53 MG/DL (ref 0.72–1.25)
CREAT/UREA NIT SERPL: 38
EOSINOPHIL # BLD AUTO: 0.56 X10(3)/MCL (ref 0–0.9)
EOSINOPHIL NFR BLD AUTO: 4.6 %
ERYTHROCYTE [DISTWIDTH] IN BLOOD BY AUTOMATED COUNT: 16.7 % (ref 11.5–17)
GFR SERPLBLD CREATININE-BSD FMLA CKD-EPI: 48 ML/MIN/1.73/M2
GLOBULIN SER-MCNC: 4.5 GM/DL (ref 2.4–3.5)
GLUCOSE SERPL-MCNC: 183 MG/DL (ref 82–115)
HCT VFR BLD AUTO: 27.5 % (ref 42–52)
HGB BLD-MCNC: 8.5 G/DL (ref 14–18)
IMM GRANULOCYTES # BLD AUTO: 0.07 X10(3)/MCL (ref 0–0.04)
IMM GRANULOCYTES NFR BLD AUTO: 0.6 %
LYMPHOCYTES # BLD AUTO: 2.93 X10(3)/MCL (ref 0.6–4.6)
LYMPHOCYTES NFR BLD AUTO: 24.2 %
MCH RBC QN AUTO: 25.1 PG (ref 27–31)
MCHC RBC AUTO-ENTMCNC: 30.9 G/DL (ref 33–36)
MCV RBC AUTO: 81.4 FL (ref 80–94)
MONOCYTES # BLD AUTO: 1.03 X10(3)/MCL (ref 0.1–1.3)
MONOCYTES NFR BLD AUTO: 8.5 %
NEUTROPHILS # BLD AUTO: 7.49 X10(3)/MCL (ref 2.1–9.2)
NEUTROPHILS NFR BLD AUTO: 61.7 %
NRBC BLD AUTO-RTO: 0 %
PLATELET # BLD AUTO: 353 X10(3)/MCL (ref 130–400)
PMV BLD AUTO: 10.4 FL (ref 7.4–10.4)
POTASSIUM SERPL-SCNC: 4 MMOL/L (ref 3.5–5.1)
PROT SERPL-MCNC: 6.6 GM/DL (ref 5.8–7.6)
RBC # BLD AUTO: 3.38 X10(6)/MCL (ref 4.7–6.1)
SODIUM SERPL-SCNC: 136 MMOL/L (ref 136–145)
WBC # BLD AUTO: 12.13 X10(3)/MCL (ref 4.5–11.5)

## 2024-12-15 PROCEDURE — 85025 COMPLETE CBC W/AUTO DIFF WBC: CPT | Performed by: NURSE PRACTITIONER

## 2024-12-15 PROCEDURE — 80053 COMPREHEN METABOLIC PANEL: CPT | Performed by: NURSE PRACTITIONER

## 2025-02-09 ENCOUNTER — LAB REQUISITION (OUTPATIENT)
Dept: LAB | Facility: HOSPITAL | Age: 75
End: 2025-02-09
Payer: MEDICARE

## 2025-02-09 DIAGNOSIS — R79.9 ABNORMAL FINDING OF BLOOD CHEMISTRY, UNSPECIFIED: ICD-10-CM

## 2025-02-09 LAB
ALBUMIN SERPL-MCNC: 2.5 G/DL (ref 3.4–4.8)
ALBUMIN/GLOB SERPL: 0.6 RATIO (ref 1.1–2)
ALP SERPL-CCNC: 111 UNIT/L (ref 40–150)
ALT SERPL-CCNC: 16 UNIT/L (ref 0–55)
ANION GAP SERPL CALC-SCNC: 20 MEQ/L
AST SERPL-CCNC: 13 UNIT/L (ref 5–34)
BASOPHILS # BLD AUTO: 0.05 X10(3)/MCL
BASOPHILS NFR BLD AUTO: 0.4 %
BILIRUB SERPL-MCNC: 0.4 MG/DL
BUN SERPL-MCNC: 18.2 MG/DL (ref 8.4–25.7)
CALCIUM SERPL-MCNC: 8.4 MG/DL (ref 8.8–10)
CHLORIDE SERPL-SCNC: 92 MMOL/L (ref 98–107)
CO2 SERPL-SCNC: 28 MMOL/L (ref 23–31)
CREAT SERPL-MCNC: 1.46 MG/DL (ref 0.72–1.25)
CREAT/UREA NIT SERPL: 12
EOSINOPHIL # BLD AUTO: 0.48 X10(3)/MCL (ref 0–0.9)
EOSINOPHIL NFR BLD AUTO: 4 %
ERYTHROCYTE [DISTWIDTH] IN BLOOD BY AUTOMATED COUNT: 19.8 % (ref 11.5–17)
GFR SERPLBLD CREATININE-BSD FMLA CKD-EPI: 51 ML/MIN/1.73/M2
GLOBULIN SER-MCNC: 4.2 GM/DL (ref 2.4–3.5)
GLUCOSE SERPL-MCNC: 369 MG/DL (ref 82–115)
HCT VFR BLD AUTO: 32.8 % (ref 42–52)
HGB BLD-MCNC: 9.7 G/DL (ref 14–18)
IMM GRANULOCYTES # BLD AUTO: 0.08 X10(3)/MCL (ref 0–0.04)
IMM GRANULOCYTES NFR BLD AUTO: 0.7 %
LYMPHOCYTES # BLD AUTO: 2.5 X10(3)/MCL (ref 0.6–4.6)
LYMPHOCYTES NFR BLD AUTO: 20.8 %
MCH RBC QN AUTO: 23.7 PG (ref 27–31)
MCHC RBC AUTO-ENTMCNC: 29.6 G/DL (ref 33–36)
MCV RBC AUTO: 80.2 FL (ref 80–94)
MONOCYTES # BLD AUTO: 1.06 X10(3)/MCL (ref 0.1–1.3)
MONOCYTES NFR BLD AUTO: 8.8 %
NEUTROPHILS # BLD AUTO: 7.86 X10(3)/MCL (ref 2.1–9.2)
NEUTROPHILS NFR BLD AUTO: 65.3 %
NRBC BLD AUTO-RTO: 0 %
PLATELET # BLD AUTO: 256 X10(3)/MCL (ref 130–400)
PMV BLD AUTO: 10.5 FL (ref 7.4–10.4)
POTASSIUM SERPL-SCNC: 3.4 MMOL/L (ref 3.5–5.1)
PROT SERPL-MCNC: 6.7 GM/DL (ref 5.8–7.6)
RBC # BLD AUTO: 4.09 X10(6)/MCL (ref 4.7–6.1)
SODIUM SERPL-SCNC: 140 MMOL/L (ref 136–145)
WBC # BLD AUTO: 12.03 X10(3)/MCL (ref 4.5–11.5)

## 2025-02-09 PROCEDURE — 85025 COMPLETE CBC W/AUTO DIFF WBC: CPT | Performed by: INTERNAL MEDICINE

## 2025-02-09 PROCEDURE — 80053 COMPREHEN METABOLIC PANEL: CPT | Performed by: INTERNAL MEDICINE

## 2025-02-12 ENCOUNTER — LAB REQUISITION (OUTPATIENT)
Dept: LAB | Facility: HOSPITAL | Age: 75
End: 2025-02-12
Payer: MEDICARE

## 2025-02-12 DIAGNOSIS — M62.58 MUSCLE WASTING AND ATROPHY, NOT ELSEWHERE CLASSIFIED, OTHER SITE: ICD-10-CM

## 2025-02-12 LAB
ALBUMIN SERPL-MCNC: 2.5 G/DL (ref 3.4–4.8)
ALBUMIN/GLOB SERPL: 0.6 RATIO (ref 1.1–2)
ALP SERPL-CCNC: 109 UNIT/L (ref 40–150)
ALT SERPL-CCNC: 10 UNIT/L (ref 0–55)
ANION GAP SERPL CALC-SCNC: 13 MEQ/L
AST SERPL-CCNC: 14 UNIT/L (ref 5–34)
BASOPHILS # BLD AUTO: 0.07 X10(3)/MCL
BASOPHILS NFR BLD AUTO: 0.8 %
BILIRUB SERPL-MCNC: 0.3 MG/DL
BUN SERPL-MCNC: 24.9 MG/DL (ref 8.4–25.7)
CALCIUM SERPL-MCNC: 8.3 MG/DL (ref 8.8–10)
CHLORIDE SERPL-SCNC: 91 MMOL/L (ref 98–107)
CO2 SERPL-SCNC: 36 MMOL/L (ref 23–31)
CREAT SERPL-MCNC: 1.3 MG/DL (ref 0.72–1.25)
CREAT/UREA NIT SERPL: 19
EOSINOPHIL # BLD AUTO: 0.89 X10(3)/MCL (ref 0–0.9)
EOSINOPHIL NFR BLD AUTO: 10 %
ERYTHROCYTE [DISTWIDTH] IN BLOOD BY AUTOMATED COUNT: 19.6 % (ref 11.5–17)
GFR SERPLBLD CREATININE-BSD FMLA CKD-EPI: 58 ML/MIN/1.73/M2
GLOBULIN SER-MCNC: 4.3 GM/DL (ref 2.4–3.5)
GLUCOSE SERPL-MCNC: 241 MG/DL (ref 82–115)
HCT VFR BLD AUTO: 29.5 % (ref 42–52)
HGB BLD-MCNC: 8.8 G/DL (ref 14–18)
IMM GRANULOCYTES # BLD AUTO: 0.06 X10(3)/MCL (ref 0–0.04)
IMM GRANULOCYTES NFR BLD AUTO: 0.7 %
LYMPHOCYTES # BLD AUTO: 2.31 X10(3)/MCL (ref 0.6–4.6)
LYMPHOCYTES NFR BLD AUTO: 26 %
MAGNESIUM SERPL-MCNC: 1.4 MG/DL (ref 1.6–2.6)
MCH RBC QN AUTO: 23.9 PG (ref 27–31)
MCHC RBC AUTO-ENTMCNC: 29.8 G/DL (ref 33–36)
MCV RBC AUTO: 80.2 FL (ref 80–94)
MONOCYTES # BLD AUTO: 0.95 X10(3)/MCL (ref 0.1–1.3)
MONOCYTES NFR BLD AUTO: 10.7 %
NEUTROPHILS # BLD AUTO: 4.6 X10(3)/MCL (ref 2.1–9.2)
NEUTROPHILS NFR BLD AUTO: 51.8 %
NRBC BLD AUTO-RTO: 0 %
PLATELET # BLD AUTO: 228 X10(3)/MCL (ref 130–400)
PMV BLD AUTO: 10.5 FL (ref 7.4–10.4)
POTASSIUM SERPL-SCNC: 3 MMOL/L (ref 3.5–5.1)
PROT SERPL-MCNC: 6.8 GM/DL (ref 5.8–7.6)
RBC # BLD AUTO: 3.68 X10(6)/MCL (ref 4.7–6.1)
SODIUM SERPL-SCNC: 140 MMOL/L (ref 136–145)
WBC # BLD AUTO: 8.88 X10(3)/MCL (ref 4.5–11.5)

## 2025-02-12 PROCEDURE — 80053 COMPREHEN METABOLIC PANEL: CPT | Performed by: NURSE PRACTITIONER

## 2025-02-12 PROCEDURE — 83735 ASSAY OF MAGNESIUM: CPT | Performed by: NURSE PRACTITIONER

## 2025-02-12 PROCEDURE — 85025 COMPLETE CBC W/AUTO DIFF WBC: CPT | Performed by: NURSE PRACTITIONER

## 2025-02-18 ENCOUNTER — LAB REQUISITION (OUTPATIENT)
Dept: LAB | Facility: HOSPITAL | Age: 75
End: 2025-02-18
Payer: MEDICARE

## 2025-02-18 DIAGNOSIS — D64.9 ANEMIA, UNSPECIFIED: ICD-10-CM

## 2025-02-18 LAB
EST. AVERAGE GLUCOSE BLD GHB EST-MCNC: 211.6 MG/DL
HBA1C MFR BLD: 9 %

## 2025-02-18 PROCEDURE — 83036 HEMOGLOBIN GLYCOSYLATED A1C: CPT | Performed by: INTERNAL MEDICINE

## 2025-04-30 ENCOUNTER — OUTSIDE PLACE OF SERVICE (OUTPATIENT)
Dept: PLASTIC SURGERY | Facility: CLINIC | Age: 75
End: 2025-04-30
Payer: MEDICARE

## 2025-05-01 ENCOUNTER — OUTSIDE PLACE OF SERVICE (OUTPATIENT)
Dept: PLASTIC SURGERY | Facility: CLINIC | Age: 75
End: 2025-05-01
Payer: MEDICARE

## 2025-05-02 ENCOUNTER — OUTSIDE PLACE OF SERVICE (OUTPATIENT)
Dept: PLASTIC SURGERY | Facility: CLINIC | Age: 75
End: 2025-05-02
Payer: MEDICARE

## 2025-05-04 ENCOUNTER — OUTSIDE PLACE OF SERVICE (OUTPATIENT)
Dept: PLASTIC SURGERY | Facility: CLINIC | Age: 75
End: 2025-05-04
Payer: MEDICARE

## 2025-05-12 PROBLEM — M86.042 ACUTE HEMATOGENOUS OSTEOMYELITIS OF LEFT HAND: Status: ACTIVE | Noted: 2025-05-12

## 2025-07-11 ENCOUNTER — LAB REQUISITION (OUTPATIENT)
Dept: LAB | Facility: HOSPITAL | Age: 75
End: 2025-07-11
Payer: MEDICARE

## 2025-07-11 DIAGNOSIS — R79.9 ABNORMAL FINDING OF BLOOD CHEMISTRY, UNSPECIFIED: ICD-10-CM

## 2025-07-11 LAB
25(OH)D3+25(OH)D2 SERPL-MCNC: 25 NG/ML (ref 30–80)
ALBUMIN SERPL-MCNC: 2.7 G/DL (ref 3.4–4.8)
ALBUMIN/GLOB SERPL: 0.6 RATIO (ref 1.1–2)
ALP SERPL-CCNC: 103 UNIT/L (ref 40–150)
ALT SERPL-CCNC: 12 UNIT/L (ref 0–55)
ANION GAP SERPL CALC-SCNC: 12 MEQ/L
AST SERPL-CCNC: 11 UNIT/L (ref 11–45)
BASOPHILS # BLD AUTO: 0.07 X10(3)/MCL
BASOPHILS NFR BLD AUTO: 1 %
BILIRUB SERPL-MCNC: 0.3 MG/DL
BUN SERPL-MCNC: 33.6 MG/DL (ref 8.4–25.7)
CALCIUM SERPL-MCNC: 8.4 MG/DL (ref 8.8–10)
CHLORIDE SERPL-SCNC: 98 MMOL/L (ref 98–107)
CHOLEST SERPL-MCNC: 100 MG/DL
CHOLEST/HDLC SERPL: 4 {RATIO} (ref 0–5)
CO2 SERPL-SCNC: 26 MMOL/L (ref 23–31)
CREAT SERPL-MCNC: 1.3 MG/DL (ref 0.72–1.25)
CREAT/UREA NIT SERPL: 26
EOSINOPHIL # BLD AUTO: 0.63 X10(3)/MCL (ref 0–0.9)
EOSINOPHIL NFR BLD AUTO: 9.4 %
ERYTHROCYTE [DISTWIDTH] IN BLOOD BY AUTOMATED COUNT: 18.7 % (ref 11.5–17)
EST. AVERAGE GLUCOSE BLD GHB EST-MCNC: 185.8 MG/DL
FERRITIN SERPL-MCNC: 99.69 NG/ML (ref 21.81–274.66)
GFR SERPLBLD CREATININE-BSD FMLA CKD-EPI: 58 ML/MIN/1.73/M2
GLOBULIN SER-MCNC: 4.6 GM/DL (ref 2.4–3.5)
GLUCOSE SERPL-MCNC: 302 MG/DL (ref 82–115)
HBA1C MFR BLD: 8.1 %
HCT VFR BLD AUTO: 30.1 % (ref 42–52)
HDLC SERPL-MCNC: 24 MG/DL (ref 35–60)
HGB BLD-MCNC: 9.1 G/DL (ref 14–18)
IMM GRANULOCYTES # BLD AUTO: 0.02 X10(3)/MCL (ref 0–0.04)
IMM GRANULOCYTES NFR BLD AUTO: 0.3 %
IRON SATN MFR SERPL: 13 % (ref 20–50)
IRON SERPL-MCNC: 25 UG/DL (ref 65–175)
LDLC SERPL CALC-MCNC: 50 MG/DL (ref 50–140)
LYMPHOCYTES # BLD AUTO: 1.88 X10(3)/MCL (ref 0.6–4.6)
LYMPHOCYTES NFR BLD AUTO: 28 %
MCH RBC QN AUTO: 22.7 PG (ref 27–31)
MCHC RBC AUTO-ENTMCNC: 30.2 G/DL (ref 33–36)
MCV RBC AUTO: 75.1 FL (ref 80–94)
MONOCYTES # BLD AUTO: 0.7 X10(3)/MCL (ref 0.1–1.3)
MONOCYTES NFR BLD AUTO: 10.4 %
NEUTROPHILS # BLD AUTO: 3.41 X10(3)/MCL (ref 2.1–9.2)
NEUTROPHILS NFR BLD AUTO: 50.9 %
NRBC BLD AUTO-RTO: 0 %
PLATELET # BLD AUTO: 363 X10(3)/MCL (ref 130–400)
PMV BLD AUTO: 10.1 FL (ref 7.4–10.4)
POTASSIUM SERPL-SCNC: 4.3 MMOL/L (ref 3.5–5.1)
PREALB SERPL-MCNC: 14.5 MG/DL (ref 16–42)
PROT SERPL-MCNC: 7.3 GM/DL (ref 5.8–7.6)
RBC # BLD AUTO: 4.01 X10(6)/MCL (ref 4.7–6.1)
SODIUM SERPL-SCNC: 136 MMOL/L (ref 136–145)
TIBC SERPL-MCNC: 175 UG/DL (ref 60–240)
TIBC SERPL-MCNC: 200 UG/DL (ref 250–450)
TRANSFERRIN SERPL-MCNC: 187 MG/DL (ref 163–344)
TRIGL SERPL-MCNC: 130 MG/DL (ref 34–140)
TSH SERPL-ACNC: 6.81 UIU/ML (ref 0.35–4.94)
VLDLC SERPL CALC-MCNC: 26 MG/DL
WBC # BLD AUTO: 6.71 X10(3)/MCL (ref 4.5–11.5)

## 2025-07-11 PROCEDURE — 80061 LIPID PANEL: CPT | Performed by: NURSE PRACTITIONER

## 2025-07-11 PROCEDURE — 80053 COMPREHEN METABOLIC PANEL: CPT | Performed by: NURSE PRACTITIONER

## 2025-07-11 PROCEDURE — 83036 HEMOGLOBIN GLYCOSYLATED A1C: CPT | Performed by: NURSE PRACTITIONER

## 2025-07-11 PROCEDURE — 83540 ASSAY OF IRON: CPT | Performed by: NURSE PRACTITIONER

## 2025-07-11 PROCEDURE — 82728 ASSAY OF FERRITIN: CPT | Performed by: NURSE PRACTITIONER

## 2025-07-11 PROCEDURE — 84134 ASSAY OF PREALBUMIN: CPT | Performed by: NURSE PRACTITIONER

## 2025-07-11 PROCEDURE — 82306 VITAMIN D 25 HYDROXY: CPT | Performed by: NURSE PRACTITIONER

## 2025-07-11 PROCEDURE — 85025 COMPLETE CBC W/AUTO DIFF WBC: CPT | Performed by: NURSE PRACTITIONER

## 2025-07-11 PROCEDURE — 84443 ASSAY THYROID STIM HORMONE: CPT | Performed by: NURSE PRACTITIONER

## 2025-07-30 ENCOUNTER — LAB REQUISITION (OUTPATIENT)
Dept: LAB | Facility: HOSPITAL | Age: 75
End: 2025-07-30
Payer: MEDICARE

## 2025-07-30 DIAGNOSIS — J44.9 CHRONIC OBSTRUCTIVE PULMONARY DISEASE, UNSPECIFIED: ICD-10-CM

## 2025-07-30 LAB
25(OH)D3+25(OH)D2 SERPL-MCNC: 16 NG/ML (ref 30–80)
ALBUMIN SERPL-MCNC: 2.3 G/DL (ref 3.4–4.8)
ALBUMIN/GLOB SERPL: 0.5 RATIO (ref 1.1–2)
ALP SERPL-CCNC: 89 UNIT/L (ref 40–150)
ALT SERPL-CCNC: 9 UNIT/L (ref 0–55)
ANION GAP SERPL CALC-SCNC: 6 MEQ/L
AST SERPL-CCNC: 12 UNIT/L (ref 11–45)
BASOPHILS # BLD AUTO: 0.04 X10(3)/MCL
BASOPHILS NFR BLD AUTO: 0.4 %
BILIRUB SERPL-MCNC: 0.3 MG/DL
BUN SERPL-MCNC: 24.2 MG/DL (ref 8.4–25.7)
CALCIUM SERPL-MCNC: 8.8 MG/DL (ref 8.8–10)
CHLORIDE SERPL-SCNC: 99 MMOL/L (ref 98–107)
CO2 SERPL-SCNC: 31 MMOL/L (ref 23–31)
CREAT SERPL-MCNC: 1.48 MG/DL (ref 0.72–1.25)
CREAT/UREA NIT SERPL: 16
EOSINOPHIL # BLD AUTO: 0.39 X10(3)/MCL (ref 0–0.9)
EOSINOPHIL NFR BLD AUTO: 4 %
ERYTHROCYTE [DISTWIDTH] IN BLOOD BY AUTOMATED COUNT: 18.6 % (ref 11.5–17)
EST. AVERAGE GLUCOSE BLD GHB EST-MCNC: 214.5 MG/DL
FERRITIN SERPL-MCNC: 145.88 NG/ML (ref 21.81–274.66)
GFR SERPLBLD CREATININE-BSD FMLA CKD-EPI: 50 ML/MIN/1.73/M2
GLOBULIN SER-MCNC: 5.1 GM/DL (ref 2.4–3.5)
GLUCOSE SERPL-MCNC: 435 MG/DL (ref 82–115)
HBA1C MFR BLD: 9.1 %
HCT VFR BLD AUTO: 27.4 % (ref 42–52)
HGB BLD-MCNC: 8.3 G/DL (ref 14–18)
IMM GRANULOCYTES # BLD AUTO: 0.06 X10(3)/MCL (ref 0–0.04)
IMM GRANULOCYTES NFR BLD AUTO: 0.6 %
IRON SATN MFR SERPL: 12 % (ref 20–50)
IRON SERPL-MCNC: 21 UG/DL (ref 65–175)
LYMPHOCYTES # BLD AUTO: 1.4 X10(3)/MCL (ref 0.6–4.6)
LYMPHOCYTES NFR BLD AUTO: 14.4 %
MCH RBC QN AUTO: 23.2 PG (ref 27–31)
MCHC RBC AUTO-ENTMCNC: 30.3 G/DL (ref 33–36)
MCV RBC AUTO: 76.8 FL (ref 80–94)
MONOCYTES # BLD AUTO: 0.73 X10(3)/MCL (ref 0.1–1.3)
MONOCYTES NFR BLD AUTO: 7.5 %
NEUTROPHILS # BLD AUTO: 7.07 X10(3)/MCL (ref 2.1–9.2)
NEUTROPHILS NFR BLD AUTO: 73.1 %
NRBC BLD AUTO-RTO: 0 %
PLATELET # BLD AUTO: 251 X10(3)/MCL (ref 130–400)
PMV BLD AUTO: 9.7 FL (ref 7.4–10.4)
POTASSIUM SERPL-SCNC: 4.6 MMOL/L (ref 3.5–5.1)
PREALB SERPL-MCNC: 11.5 MG/DL (ref 16–42)
PROT SERPL-MCNC: 7.4 GM/DL (ref 5.8–7.6)
RBC # BLD AUTO: 3.57 X10(6)/MCL (ref 4.7–6.1)
SODIUM SERPL-SCNC: 136 MMOL/L (ref 136–145)
TIBC SERPL-MCNC: 154 UG/DL (ref 60–240)
TIBC SERPL-MCNC: 175 UG/DL (ref 250–450)
TRANSFERRIN SERPL-MCNC: 160 MG/DL (ref 163–344)
TSH SERPL-ACNC: 10.53 UIU/ML (ref 0.35–4.94)
WBC # BLD AUTO: 9.69 X10(3)/MCL (ref 4.5–11.5)

## 2025-07-30 PROCEDURE — 82306 VITAMIN D 25 HYDROXY: CPT | Performed by: INTERNAL MEDICINE

## 2025-07-30 PROCEDURE — 84443 ASSAY THYROID STIM HORMONE: CPT | Performed by: INTERNAL MEDICINE

## 2025-07-30 PROCEDURE — 82728 ASSAY OF FERRITIN: CPT | Performed by: INTERNAL MEDICINE

## 2025-07-30 PROCEDURE — 80053 COMPREHEN METABOLIC PANEL: CPT | Performed by: INTERNAL MEDICINE

## 2025-07-30 PROCEDURE — 85025 COMPLETE CBC W/AUTO DIFF WBC: CPT | Performed by: INTERNAL MEDICINE

## 2025-07-30 PROCEDURE — 83550 IRON BINDING TEST: CPT | Performed by: INTERNAL MEDICINE

## 2025-07-30 PROCEDURE — 84134 ASSAY OF PREALBUMIN: CPT | Performed by: INTERNAL MEDICINE

## 2025-07-30 PROCEDURE — 83036 HEMOGLOBIN GLYCOSYLATED A1C: CPT | Performed by: INTERNAL MEDICINE

## 2025-08-04 ENCOUNTER — LAB REQUISITION (OUTPATIENT)
Dept: LAB | Facility: HOSPITAL | Age: 75
End: 2025-08-04
Payer: MEDICARE

## 2025-08-04 DIAGNOSIS — M62.58 MUSCLE WASTING AND ATROPHY, NOT ELSEWHERE CLASSIFIED, OTHER SITE: ICD-10-CM

## 2025-08-04 LAB
BASOPHILS # BLD AUTO: 0.03 X10(3)/MCL
BASOPHILS NFR BLD AUTO: 0.4 %
EOSINOPHIL # BLD AUTO: 0.53 X10(3)/MCL (ref 0–0.9)
EOSINOPHIL NFR BLD AUTO: 7.2 %
ERYTHROCYTE [DISTWIDTH] IN BLOOD BY AUTOMATED COUNT: 19.1 % (ref 11.5–17)
FOLATE SERPL-MCNC: 10.4 NG/ML (ref 7–31.4)
HCT VFR BLD AUTO: 26.1 % (ref 42–52)
HGB BLD-MCNC: 7.7 G/DL (ref 14–18)
IMM GRANULOCYTES # BLD AUTO: 0.03 X10(3)/MCL (ref 0–0.04)
IMM GRANULOCYTES NFR BLD AUTO: 0.4 %
LYMPHOCYTES # BLD AUTO: 2.12 X10(3)/MCL (ref 0.6–4.6)
LYMPHOCYTES NFR BLD AUTO: 28.6 %
MCH RBC QN AUTO: 23.1 PG (ref 27–31)
MCHC RBC AUTO-ENTMCNC: 29.5 G/DL (ref 33–36)
MCV RBC AUTO: 78.1 FL (ref 80–94)
MONOCYTES # BLD AUTO: 0.73 X10(3)/MCL (ref 0.1–1.3)
MONOCYTES NFR BLD AUTO: 9.9 %
NEUTROPHILS # BLD AUTO: 3.96 X10(3)/MCL (ref 2.1–9.2)
NEUTROPHILS NFR BLD AUTO: 53.5 %
NRBC BLD AUTO-RTO: 0 %
PLATELET # BLD AUTO: 262 X10(3)/MCL (ref 130–400)
PMV BLD AUTO: 10.8 FL (ref 7.4–10.4)
RBC # BLD AUTO: 3.34 X10(6)/MCL (ref 4.7–6.1)
VIT B12 SERPL-MCNC: 426 PG/ML (ref 213–816)
WBC # BLD AUTO: 7.4 X10(3)/MCL (ref 4.5–11.5)

## 2025-08-04 PROCEDURE — 82746 ASSAY OF FOLIC ACID SERUM: CPT | Performed by: INTERNAL MEDICINE

## 2025-08-04 PROCEDURE — 82607 VITAMIN B-12: CPT | Performed by: INTERNAL MEDICINE

## 2025-08-04 PROCEDURE — 85025 COMPLETE CBC W/AUTO DIFF WBC: CPT | Performed by: INTERNAL MEDICINE

## 2025-08-12 ENCOUNTER — LAB REQUISITION (OUTPATIENT)
Dept: LAB | Facility: HOSPITAL | Age: 75
End: 2025-08-12
Payer: MEDICARE

## 2025-08-12 DIAGNOSIS — E11.65 TYPE 2 DIABETES MELLITUS WITH HYPERGLYCEMIA: ICD-10-CM

## 2025-08-12 LAB
BASOPHILS # BLD AUTO: 0.05 X10(3)/MCL
BASOPHILS NFR BLD AUTO: 0.7 %
EOSINOPHIL # BLD AUTO: 0.5 X10(3)/MCL (ref 0–0.9)
EOSINOPHIL NFR BLD AUTO: 7.3 %
ERYTHROCYTE [DISTWIDTH] IN BLOOD BY AUTOMATED COUNT: 18.8 % (ref 11.5–17)
HCT VFR BLD AUTO: 27.8 % (ref 42–52)
HGB BLD-MCNC: 8.1 G/DL (ref 14–18)
IMM GRANULOCYTES # BLD AUTO: 0.02 X10(3)/MCL (ref 0–0.04)
IMM GRANULOCYTES NFR BLD AUTO: 0.3 %
LYMPHOCYTES # BLD AUTO: 2.28 X10(3)/MCL (ref 0.6–4.6)
LYMPHOCYTES NFR BLD AUTO: 33.1 %
MCH RBC QN AUTO: 22.6 PG (ref 27–31)
MCHC RBC AUTO-ENTMCNC: 29.1 G/DL (ref 33–36)
MCV RBC AUTO: 77.4 FL (ref 80–94)
MONOCYTES # BLD AUTO: 0.76 X10(3)/MCL (ref 0.1–1.3)
MONOCYTES NFR BLD AUTO: 11 %
NEUTROPHILS # BLD AUTO: 3.27 X10(3)/MCL (ref 2.1–9.2)
NEUTROPHILS NFR BLD AUTO: 47.6 %
NRBC BLD AUTO-RTO: 0 %
PLATELET # BLD AUTO: 280 X10(3)/MCL (ref 130–400)
PMV BLD AUTO: 10.3 FL (ref 7.4–10.4)
RBC # BLD AUTO: 3.59 X10(6)/MCL (ref 4.7–6.1)
WBC # BLD AUTO: 6.88 X10(3)/MCL (ref 4.5–11.5)

## 2025-08-12 PROCEDURE — 85025 COMPLETE CBC W/AUTO DIFF WBC: CPT | Performed by: INTERNAL MEDICINE

## 2025-08-18 ENCOUNTER — LAB REQUISITION (OUTPATIENT)
Dept: LAB | Facility: HOSPITAL | Age: 75
End: 2025-08-18
Payer: MEDICARE

## 2025-08-18 DIAGNOSIS — N18.9 CHRONIC KIDNEY DISEASE, UNSPECIFIED: ICD-10-CM

## 2025-08-18 LAB
BASOPHILS # BLD AUTO: 0.04 X10(3)/MCL
BASOPHILS NFR BLD AUTO: 0.6 %
EOSINOPHIL # BLD AUTO: 0.11 X10(3)/MCL (ref 0–0.9)
EOSINOPHIL NFR BLD AUTO: 1.5 %
ERYTHROCYTE [DISTWIDTH] IN BLOOD BY AUTOMATED COUNT: 18.5 % (ref 11.5–17)
HCT VFR BLD AUTO: 27.3 % (ref 42–52)
HGB BLD-MCNC: 8.2 G/DL (ref 14–18)
IMM GRANULOCYTES # BLD AUTO: 0.03 X10(3)/MCL (ref 0–0.04)
IMM GRANULOCYTES NFR BLD AUTO: 0.4 %
LYMPHOCYTES # BLD AUTO: 1.64 X10(3)/MCL (ref 0.6–4.6)
LYMPHOCYTES NFR BLD AUTO: 22.6 %
MCH RBC QN AUTO: 22.8 PG (ref 27–31)
MCHC RBC AUTO-ENTMCNC: 30 G/DL (ref 33–36)
MCV RBC AUTO: 76 FL (ref 80–94)
MONOCYTES # BLD AUTO: 1.02 X10(3)/MCL (ref 0.1–1.3)
MONOCYTES NFR BLD AUTO: 14 %
NEUTROPHILS # BLD AUTO: 4.43 X10(3)/MCL (ref 2.1–9.2)
NEUTROPHILS NFR BLD AUTO: 60.9 %
NRBC BLD AUTO-RTO: 0 %
PLATELET # BLD AUTO: 239 X10(3)/MCL (ref 130–400)
PMV BLD AUTO: 10.7 FL (ref 7.4–10.4)
RBC # BLD AUTO: 3.59 X10(6)/MCL (ref 4.7–6.1)
WBC # BLD AUTO: 7.27 X10(3)/MCL (ref 4.5–11.5)

## 2025-08-18 PROCEDURE — 85025 COMPLETE CBC W/AUTO DIFF WBC: CPT | Performed by: INTERNAL MEDICINE

## 2025-08-19 ENCOUNTER — LAB REQUISITION (OUTPATIENT)
Dept: LAB | Facility: HOSPITAL | Age: 75
End: 2025-08-19
Payer: MEDICARE

## 2025-08-19 DIAGNOSIS — M62.58 MUSCLE WASTING AND ATROPHY, NOT ELSEWHERE CLASSIFIED, OTHER SITE: ICD-10-CM

## 2025-08-19 LAB
ALBUMIN SERPL-MCNC: 2.5 G/DL (ref 3.4–4.8)
ALBUMIN/GLOB SERPL: 0.6 RATIO (ref 1.1–2)
ALP SERPL-CCNC: 97 UNIT/L (ref 40–150)
ALT SERPL-CCNC: 10 UNIT/L (ref 0–55)
ANION GAP SERPL CALC-SCNC: 13 MEQ/L
AST SERPL-CCNC: 17 UNIT/L (ref 11–45)
BASOPHILS # BLD AUTO: 0.02 X10(3)/MCL
BASOPHILS NFR BLD AUTO: 0.4 %
BILIRUB SERPL-MCNC: 0.5 MG/DL
BUN SERPL-MCNC: 29.8 MG/DL (ref 8.4–25.7)
CALCIUM SERPL-MCNC: 8 MG/DL (ref 8.8–10)
CHLORIDE SERPL-SCNC: 98 MMOL/L (ref 98–107)
CO2 SERPL-SCNC: 25 MMOL/L (ref 23–31)
CREAT SERPL-MCNC: 1.65 MG/DL (ref 0.72–1.25)
CREAT/UREA NIT SERPL: 18
EOSINOPHIL # BLD AUTO: 0.05 X10(3)/MCL (ref 0–0.9)
EOSINOPHIL NFR BLD AUTO: 1 %
ERYTHROCYTE [DISTWIDTH] IN BLOOD BY AUTOMATED COUNT: 18.8 % (ref 11.5–17)
GFR SERPLBLD CREATININE-BSD FMLA CKD-EPI: 44 ML/MIN/1.73/M2
GLOBULIN SER-MCNC: 4.2 GM/DL (ref 2.4–3.5)
GLUCOSE SERPL-MCNC: 250 MG/DL (ref 82–115)
HCT VFR BLD AUTO: 27.7 % (ref 42–52)
HGB BLD-MCNC: 8.2 G/DL (ref 14–18)
IMM GRANULOCYTES # BLD AUTO: 0.02 X10(3)/MCL (ref 0–0.04)
IMM GRANULOCYTES NFR BLD AUTO: 0.4 %
LYMPHOCYTES # BLD AUTO: 1.13 X10(3)/MCL (ref 0.6–4.6)
LYMPHOCYTES NFR BLD AUTO: 21.5 %
MCH RBC QN AUTO: 22.7 PG (ref 27–31)
MCHC RBC AUTO-ENTMCNC: 29.6 G/DL (ref 33–36)
MCV RBC AUTO: 76.7 FL (ref 80–94)
MONOCYTES # BLD AUTO: 0.54 X10(3)/MCL (ref 0.1–1.3)
MONOCYTES NFR BLD AUTO: 10.3 %
NEUTROPHILS # BLD AUTO: 3.5 X10(3)/MCL (ref 2.1–9.2)
NEUTROPHILS NFR BLD AUTO: 66.4 %
NRBC BLD AUTO-RTO: 0 %
PLATELET # BLD AUTO: 201 X10(3)/MCL (ref 130–400)
PMV BLD AUTO: 11 FL (ref 7.4–10.4)
POTASSIUM SERPL-SCNC: 3.7 MMOL/L (ref 3.5–5.1)
PROT SERPL-MCNC: 6.7 GM/DL (ref 5.8–7.6)
RBC # BLD AUTO: 3.61 X10(6)/MCL (ref 4.7–6.1)
SODIUM SERPL-SCNC: 136 MMOL/L (ref 136–145)
WBC # BLD AUTO: 5.26 X10(3)/MCL (ref 4.5–11.5)

## 2025-08-19 PROCEDURE — 80053 COMPREHEN METABOLIC PANEL: CPT | Performed by: NURSE PRACTITIONER

## 2025-08-19 PROCEDURE — 85025 COMPLETE CBC W/AUTO DIFF WBC: CPT | Performed by: NURSE PRACTITIONER

## (undated) DEVICE — BANDAGE KERLIX AMD

## (undated) DEVICE — BAG DRAIN ANTI REFLUX 2000ML

## (undated) DEVICE — POSITIONER HEAD ADULT

## (undated) DEVICE — DRESSING GAUZE XEROFORM 5X9

## (undated) DEVICE — COLLAGEN CELLERATE ACTIVATED 1GM

## (undated) DEVICE — Device

## (undated) DEVICE — SPONGE KERLIX ANTIMIC 6X6.75IN

## (undated) DEVICE — LOOP CUT RESECTSCPE 30 DG 24F

## (undated) DEVICE — GAUZE SPONGE 4X4 12PLY

## (undated) DEVICE — GLOVE PROTEXIS LTX MICRO  7.5

## (undated) DEVICE — BANDAGE ESMARK ELASTIC ST 4X9

## (undated) DEVICE — KIT SURGICAL TURNOVER

## (undated) DEVICE — BOWL UTILITY BLUE 32OZ

## (undated) DEVICE — SYR 50ML CATH TIP

## (undated) DEVICE — BANDAGE MATRIX HK LOOP 4IN 5YD

## (undated) DEVICE — DRESSING XEROFORM GAUZE 5X9

## (undated) DEVICE — SOL IRR SORBITOL 3% 3000ML

## (undated) DEVICE — GLOVE PROTEXIS BLUE LATEX 7

## (undated) DEVICE — SOL NACL IRR 1000ML BTL

## (undated) DEVICE — SOL NORMAL USPCA 0.9%

## (undated) DEVICE — ADAPTER STOPCOCK FEMALE LL

## (undated) DEVICE — ELECTRODE RESECTION BUTTON LRG

## (undated) DEVICE — SOL IRRI STRL WATER 1000ML

## (undated) DEVICE — STAPLER SKIN PROXIMATE WIDE

## (undated) DEVICE — ELECTRODE PATIENT RETURN DISP

## (undated) DEVICE — DRAPE MEDIUM SHEET 40X70IN

## (undated) DEVICE — TUBE SUCTION MEDI-VAC STERILE

## (undated) DEVICE — DRAPE EXTREMITY ORTHOMAX

## (undated) DEVICE — SYR ONLY 60CC TOOMEY

## (undated) DEVICE — DRESSING MEPILEX AG SIL 8X4IN

## (undated) DEVICE — COVER PROXIMA MAYO STAND

## (undated) DEVICE — GLOVE PROTEXIS HYDROGEL SZ7.5

## (undated) DEVICE — SOL IRRIGATION WATER 3000ML

## (undated) DEVICE — TRAY SKIN SCRUB WET PREMIUM

## (undated) DEVICE — GLOVE PROTEXIS PI SYN SURG 7.5

## (undated) DEVICE — BAG DRAIN UROLOGY AND HOSE

## (undated) DEVICE — DRESSING MEPITEL ONE 7X4IN

## (undated) DEVICE — SUPPORT ULNA NERVE PROTECTOR

## (undated) DEVICE — SET UROLOGY TBNG UP TO 300MMHG

## (undated) DEVICE — CURETTE DERMAL 7MM DISP

## (undated) DEVICE — BLADE SURG STAINLESS STEEL #15

## (undated) DEVICE — GLOVE PROTEXIS LTX MICRO 6.5

## (undated) DEVICE — PAD PREP CUFFED NS 24X48IN